# Patient Record
Sex: FEMALE | Race: WHITE | NOT HISPANIC OR LATINO | Employment: OTHER | URBAN - METROPOLITAN AREA
[De-identification: names, ages, dates, MRNs, and addresses within clinical notes are randomized per-mention and may not be internally consistent; named-entity substitution may affect disease eponyms.]

---

## 2023-08-07 ENCOUNTER — HOSPITAL ENCOUNTER (OUTPATIENT)
Dept: DATA CONVERSION | Facility: HOSPITAL | Age: 63
End: 2023-08-07

## 2023-08-07 DIAGNOSIS — M16.11 UNILATERAL PRIMARY OSTEOARTHRITIS, RIGHT HIP: ICD-10-CM

## 2023-09-12 ENCOUNTER — HOSPITAL ENCOUNTER (OUTPATIENT)
Dept: DATA CONVERSION | Facility: HOSPITAL | Age: 63
Discharge: HOME | End: 2023-09-12
Payer: COMMERCIAL

## 2023-09-12 DIAGNOSIS — R30.9 PAINFUL MICTURITION, UNSPECIFIED: ICD-10-CM

## 2023-09-12 DIAGNOSIS — M16.11 UNILATERAL PRIMARY OSTEOARTHRITIS, RIGHT HIP: ICD-10-CM

## 2023-09-12 LAB
BACTERIA SPEC CULT: NORMAL
BACTERIA UR QL AUTO: NEGATIVE
BILIRUB UR QL STRIP.AUTO: NEGATIVE
CC # UR: NORMAL /UL
CLARITY UR: CLEAR
COLOR UR: YELLOW
GLUCOSE UR STRIP.AUTO-MCNC: NEGATIVE MG/DL
HGB UR QL STRIP.AUTO: ABNORMAL /HPF (ref 0–3)
HGB UR QL: NEGATIVE
HYALINE CASTS UR QL AUTO: ABNORMAL /LPF
KETONES UR QL STRIP.AUTO: NEGATIVE
LEUKOCYTE ESTERASE UR QL STRIP.AUTO: ABNORMAL
MICROSCOPIC (UA): ABNORMAL
MRSA DNA SPEC QL NAA+PROBE: NEGATIVE
NITRITE UR QL STRIP.AUTO: NEGATIVE
PH UR STRIP.AUTO: 6 [PH] (ref 4.6–8)
PROT UR STRIP.AUTO-MCNC: NEGATIVE MG/DL
REPORT STATUS -LH SQ DATA CONVERSION: NORMAL
SERVICE CMNT-IMP: NORMAL
SP GR UR STRIP.AUTO: 1.01 (ref 1–1.03)
SPECIMEN SOURCE: NORMAL
SPECIMEN SOURCE: NORMAL
SQUAMOUS UR QL AUTO: ABNORMAL /HPF
URINE CULTURE: ABNORMAL
UROBILINOGEN UR QL STRIP.AUTO: NORMAL MG/DL (ref 0–1)
WBC #/AREA URNS AUTO: 3 /HPF (ref 0–3)

## 2023-09-25 ENCOUNTER — HOSPITAL ENCOUNTER (OUTPATIENT)
Dept: DATA CONVERSION | Facility: HOSPITAL | Age: 63
Discharge: HOME HEALTH | End: 2023-09-26
Payer: COMMERCIAL

## 2023-09-25 DIAGNOSIS — D62 ACUTE POSTHEMORRHAGIC ANEMIA: ICD-10-CM

## 2023-09-25 DIAGNOSIS — Z85.42 PERSONAL HISTORY OF MALIGNANT NEOPLASM OF OTHER PARTS OF UTERUS: ICD-10-CM

## 2023-09-25 DIAGNOSIS — G47.33 OBSTRUCTIVE SLEEP APNEA (ADULT) (PEDIATRIC): ICD-10-CM

## 2023-09-25 DIAGNOSIS — M16.11 UNILATERAL PRIMARY OSTEOARTHRITIS, RIGHT HIP: ICD-10-CM

## 2023-09-25 DIAGNOSIS — E66.9 OBESITY, UNSPECIFIED: ICD-10-CM

## 2023-09-25 LAB
ABO + RH BLD: NORMAL
BLD GP AB SCN SERPL QL: NEGATIVE
BLD PROD TYP BPU: NORMAL
BPU ID: NORMAL
BPU ID: NORMAL
HX OF BLOOD TRANSFUSION: NORMAL
MAJ XM SERPL-IMP: NORMAL
MAJ XM SERPL-IMP: NORMAL
NUM BPU REQUESTED: 2
SPECIMEN EXP DATE BLD: NORMAL
STATUS OF UNIT: NORMAL
STATUS OF UNIT: NORMAL
SURGERY DATE: NORMAL
TEST ORDERED: NORMAL
TRANSF BAND NUM PATIENT: NORMAL
TRANSFUSION STATUS: NORMAL
TRANSFUSION STATUS: NORMAL
UNIT DIVISION: 0
UNIT DIVISION: 0

## 2023-09-26 LAB
ANION GAP SERPL CALCULATED.3IONS-SCNC: 9 MMOL/L (ref 0–19)
BASOPHILS # BLD AUTO: 0.01 K/UL (ref 0–0.22)
BASOPHILS NFR BLD AUTO: 0.1 % (ref 0–1)
BUN SERPL-MCNC: 18 MG/DL (ref 8–25)
BUN/CREAT SERPL: 22.5 RATIO (ref 8–21)
CALCIUM SERPL-MCNC: 8.7 MG/DL (ref 8.5–10.4)
CHLORIDE SERPL-SCNC: 107 MMOL/L (ref 97–107)
CO2 SERPL-SCNC: 21 MMOL/L (ref 24–31)
CREAT SERPL-MCNC: 0.8 MG/DL (ref 0.4–1.6)
DEPRECATED RDW RBC AUTO: 44.2 FL (ref 37–54)
DIFFERENTIAL METHOD BLD: ABNORMAL
EOSINOPHIL # BLD AUTO: 0.02 K/UL (ref 0–0.45)
EOSINOPHIL NFR BLD: 0.2 % (ref 0–3)
ERYTHROCYTE [DISTWIDTH] IN BLOOD BY AUTOMATED COUNT: 13 % (ref 11.7–15)
GFR SERPL CREATININE-BSD FRML MDRD: 82 ML/MIN/1.73 M2
GLUCOSE SERPL-MCNC: 143 MG/DL (ref 65–99)
HCT VFR BLD AUTO: 34.5 % (ref 36–44)
HGB BLD-MCNC: 11.5 GM/DL (ref 12–15)
IMM GRANULOCYTES # BLD AUTO: 0.1 K/UL (ref 0–0.1)
IRON SATN MFR SERPL: 19.6 % (ref 12–50)
IRON SERPL-MCNC: 47 UG/DL (ref 30–160)
LYMPHOCYTES # BLD AUTO: 1.56 K/UL (ref 1.2–3.2)
LYMPHOCYTES NFR BLD MANUAL: 11.8 % (ref 20–40)
MCH RBC QN AUTO: 30.8 PG (ref 26–34)
MCHC RBC AUTO-ENTMCNC: 33.3 % (ref 31–37)
MCV RBC AUTO: 92.5 FL (ref 80–100)
MONOCYTES # BLD AUTO: 1.59 K/UL (ref 0–0.8)
MONOCYTES NFR BLD MANUAL: 12 % (ref 0–8)
NEUTROPHILS # BLD AUTO: 9.95 K/UL
NEUTROPHILS # BLD AUTO: 9.95 K/UL (ref 1.8–7.7)
NEUTROPHILS.IMMATURE NFR BLD: 0.8 % (ref 0–1)
NEUTS SEG NFR BLD: 75.1 % (ref 50–70)
NRBC BLD-RTO: 0 /100 WBC
PLATELET # BLD AUTO: 243 K/UL (ref 150–450)
PMV BLD AUTO: 10.8 CU (ref 7–12.6)
POTASSIUM SERPL-SCNC: 4.2 MMOL/L (ref 3.4–5.1)
RBC # BLD AUTO: 3.73 M/UL (ref 4–4.9)
SODIUM SERPL-SCNC: 137 MMOL/L (ref 133–145)
TIBC SERPL-MCNC: 240 UG/DL (ref 228–428)
WBC # BLD AUTO: 13.2 K/UL (ref 4.5–11)

## 2023-10-10 ENCOUNTER — OFFICE VISIT (OUTPATIENT)
Dept: ORTHOPEDIC SURGERY | Facility: CLINIC | Age: 63
End: 2023-10-10
Payer: COMMERCIAL

## 2023-10-10 VITALS — BODY MASS INDEX: 38.25 KG/M2 | WEIGHT: 238 LBS | HEIGHT: 66 IN

## 2023-10-10 DIAGNOSIS — Z96.641 STATUS POST TOTAL HIP REPLACEMENT, RIGHT: Primary | ICD-10-CM

## 2023-10-10 PROCEDURE — 99024 POSTOP FOLLOW-UP VISIT: CPT | Performed by: ORTHOPAEDIC SURGERY

## 2023-10-10 NOTE — PROGRESS NOTES
Subjective    Patient ID: Avis Brito is a 63 y.o. female.    Chief Complaint: 2 weeks status post right total hip Post-op of the Right Hip (HANSEL)     Last Surgery: No surgery found  Last Surgery Date: No surgery found    HPI doing very well    Objective   Ortho Exam excellent range of motion no distal edema mild swelling wound is intact walking on the walker    Image Results:  XR hip right 1 view  PROCEDURE:         HIP RT 1 VIEW W OR WO AP PELVIS - IXR  0124  REASON FOR EXAM: postop    RESULT: MRN: [id number]  Patient Name: AVIS BRITO    STUDY:  HIP RT 1 VIEW W OR WO AP PELVIS    INDICATION:  postop    COMPARISON:  April 18, 2023    ACCESSION NUMBER(S):  [id number]    ORDERING CLINICIAN:  IRMA CHRISTIAN    TECHNIQUE:  Views: AP    FINDINGS:  RESULT: Postsurgical changes from right total hip arthroplasty are noted.  Soft tissue gas is noted adjacent to the proximal right femur. There is  joint space narrowing and osteophyte formation of the left hip.    IMPRESSION:  Postsurgical changes from right total hip arthroplasty.    Dictation workstation:   [id number]    Original Interpreting Physician:   ALF ROMEO MD  Original Transcribed by/Date: MMODAL Sep 25 2023  7:23A  Original Electronically Signed by/Date: ALF ROMEO MD Sep 25 2023  10:20A    Addendum Interpreting Physician:  Addendum Transcribed by/Date: NO ADDENDUM  Addendum Electronically Signed by/Date:      Assessment/Plan doing very well we will continue her in outpatient physical therapy she will continue to ice elevate we will see her in 4 weeks  Encounter Diagnoses:  Status post total hip replacement, right    No orders of the defined types were placed in this encounter.    No follow-ups on file.

## 2023-10-17 ENCOUNTER — APPOINTMENT (OUTPATIENT)
Dept: PHYSICAL THERAPY | Facility: HOSPITAL | Age: 63
End: 2023-10-17

## 2023-10-17 ENCOUNTER — EVALUATION (OUTPATIENT)
Dept: PHYSICAL THERAPY | Facility: HOSPITAL | Age: 63
End: 2023-10-17
Payer: COMMERCIAL

## 2023-10-17 DIAGNOSIS — M25.551 PAIN OF RIGHT HIP: Primary | ICD-10-CM

## 2023-10-17 DIAGNOSIS — Z96.641 STATUS POST TOTAL HIP REPLACEMENT, RIGHT: ICD-10-CM

## 2023-10-17 PROCEDURE — 97161 PT EVAL LOW COMPLEX 20 MIN: CPT | Mod: GP | Performed by: PHYSICAL THERAPIST

## 2023-10-17 PROCEDURE — 97110 THERAPEUTIC EXERCISES: CPT | Mod: GP | Performed by: PHYSICAL THERAPIST

## 2023-10-17 ASSESSMENT — ENCOUNTER SYMPTOMS
LOSS OF SENSATION IN FEET: 0
DEPRESSION: 0
OCCASIONAL FEELINGS OF UNSTEADINESS: 0

## 2023-10-17 ASSESSMENT — PAIN DESCRIPTION - DESCRIPTORS: DESCRIPTORS: THROBBING

## 2023-10-17 ASSESSMENT — PAIN - FUNCTIONAL ASSESSMENT: PAIN_FUNCTIONAL_ASSESSMENT: 0-10

## 2023-10-17 ASSESSMENT — PAIN SCALES - GENERAL: PAINLEVEL_OUTOF10: 2

## 2023-10-17 NOTE — Clinical Note
October 17, 2023     Patient: Kelli Brito   YOB: 1960   Date of Visit: 10/17/2023       To Whom It May Concern:    It is my medical opinion that Kelli Brito {Work release (duty restriction):30225}.    If you have any questions or concerns, please don't hesitate to call.         Sincerely,        Brandy Mcqueen, PT    CC: No Recipients

## 2023-10-17 NOTE — Clinical Note
October 17, 2023     Patient: Kelli Brito   YOB: 1960   Date of Visit: 10/17/2023       To Whom it May Concern:    Kelli Brito was seen in my clinic on 10/17/2023. She {Return to school/sport:51865}.    If you have any questions or concerns, please don't hesitate to call.         Sincerely,          Brandy Mcqueen, PT        CC: No Recipients Wheelchair/Stroller

## 2023-10-17 NOTE — PROGRESS NOTES
Physical Therapy    Physical Therapy Evaluation and Treatment      Patient Name: Cecelia Brito  MRN: [id number]  Today's Date: 10/17/2023  Time Calculation  Start Time: 1015  Stop Time: 1055  Time Calculation (min): 40 min      Assessment:  The patient is s/p R HANSEL on 09/25/2023 due to OA. She is not allowed to do R hip abduction until 6 weeks post-op. The patient demonstrates decreased R LE strength, ROM, flexibility, balance, and gait. Skilled PT warranted to address the above stated impairments, so the patient can perform FA's and ambulate without assistive device in the community.   PT Assessment Results: Decreased strength, Decreased range of motion, Decreased endurance, Impaired balance, Decreased mobility  Rehab Prognosis: Good    Plan:  Treatment/Interventions: Cryotherapy, Education/ Instruction, Gait training, Hot pack, Manual therapy, Neuromuscular re-education, Therapeutic exercises  PT Plan: Skilled PT  PT Frequency: 2 times per week  Duration: 6 weeks  Onset Date: 09/25/23  Rehab Potential: Good  Plan of Care Agreement: Patient    Current Problem:   1. Status post total hip replacement, right  Referral to Physical Therapy    Follow Up In Physical Therapy          Subjective    The patient states that she finished home therapy and is doing her HEP regularly. No major issues since her surgery.  General:  General  Reason for Referral: R HANSEL  Referred By: Dr. Mohamud  Precautions:  Precautions  STEADI Fall Risk Score (The score of 4 or more indicates an increased risk of falling): 7  Precautions Comment: No R hip abduction until 6 weeks post op     Pain:  Pain Assessment  Pain Assessment: 0-10  Pain Score: 2  Pain Type: Surgical pain  Pain Location: Hip  Pain Orientation: Right, Posterior, Outer  Pain Descriptors: Throbbing  Pain Frequency: Constant/continuous2/10 at lowest  5/10 at highest  Home Living:   Lives in multi-level home with bed and bath on first floor. 3 steps into home with rail.  Prior Level  of Function:  Prior Function Per Pt/Caregiver Report  Level of [place]: Independent with ADLs and functional transfers  Vocational: Retired  Leisure: Spending time with familyThe patient enjoys working on her home- including remodeling her home. Has 2 dogs and a cat. Has a spouse. He assists as needed. The patient is having more difficulty sleeping due to having to lay on her back.    Objective    R hip not measured for MMT due to recent surgery and post-op restrictions     10/17/23 1300   Hip Functional Rating Scale   LEFS   /80 26   Hip AROM   R hip flexion: (125°) 60   L hip flexion: (125°) WNL   L hip IR: (45°) Mod. Restrict.   Specific Lower Extremity MMT   L Iliopsoas: (5/5) 4+/5             Treatments:  Reviewed her HEP including quad set, standing hip extension, standing HS curls, mini squats, seated LAQ, heel slides.      OP EDUCATION:  Education  Individual(s) Educated: Patient  Education Provided: Anatomy, Fall Risk, Home Exercise Program, POC, Post-Op Precautions  Patient/Caregiver Demonstrated Understanding: yes  Plan of Care Discussed and Agreed Upon: yes  Patient Response to Education: Patient/Caregiver Verbalized Understanding of Information, Patient/Caregiver Performed Return Demonstration of Exercises/Activities    Goals:  Active       PT Problem       Patient will be I with HEP       Start:  10/17/23    Expected End:  10/31/23            Patient will ambulate community distances with std. cane       Start:  10/17/23    Expected End:  11/07/23            Patient will report no greater than 2/10 R hip pain with ambulating >/= 20 minutes in the community independently       Start:  10/17/23    Expected End:  11/28/23            Patient will demonstrate >/= 90 degrees of R hip flexion AROM to improve her ability to perform FA's, including transfers       Start:  10/17/23    Expected End:  11/28/23            Patient will demonstrate >/= 4+/5 R LE MMT to improve her ability to negotiate steps and  ambulate community distances independently.       Start:  10/17/23    Expected End:  11/28/23            Patient will score >/= 40/80 on LEFS to improve her ability to perform FA's       Start:  10/17/23    Expected End:  11/28/23

## 2023-10-19 PROBLEM — F41.9 ANXIETY: Status: ACTIVE | Noted: 2023-10-19

## 2023-10-19 PROBLEM — J32.9 SINUSITIS: Status: ACTIVE | Noted: 2023-10-19

## 2023-10-19 PROBLEM — G47.30 SLEEP APNEA: Status: ACTIVE | Noted: 2023-10-19

## 2023-10-19 PROBLEM — J30.9 ALLERGIC RHINITIS: Status: ACTIVE | Noted: 2023-10-19

## 2023-10-19 PROBLEM — R91.1 PULMONARY NODULE: Status: ACTIVE | Noted: 2023-10-19

## 2023-10-19 PROBLEM — E55.9 VITAMIN D DEFICIENCY: Status: ACTIVE | Noted: 2023-10-19

## 2023-10-19 PROBLEM — N95.1 MENOPAUSAL SYMPTOM: Status: ACTIVE | Noted: 2023-10-19

## 2023-10-19 PROBLEM — M51.37 DEGENERATION OF LUMBOSACRAL INTERVERTEBRAL DISC: Status: ACTIVE | Noted: 2023-10-19

## 2023-10-19 PROBLEM — H04.123 DRY EYES, BILATERAL: Status: ACTIVE | Noted: 2023-10-19

## 2023-10-19 PROBLEM — I10 HYPERTENSION: Status: ACTIVE | Noted: 2023-10-19

## 2023-10-19 PROBLEM — M99.04 SOMATIC DYSFUNCTION OF SACRAL REGION: Status: ACTIVE | Noted: 2023-10-19

## 2023-10-19 PROBLEM — K80.20 CHOLELITHIASES: Status: ACTIVE | Noted: 2023-10-19

## 2023-10-19 PROBLEM — M25.529 ELBOW PAIN: Status: ACTIVE | Noted: 2023-10-19

## 2023-10-19 PROBLEM — R92.8 ABNORMAL MAMMOGRAM: Status: ACTIVE | Noted: 2023-10-19

## 2023-10-19 PROBLEM — C55 UTERINE CANCER (MULTI): Status: ACTIVE | Noted: 2023-10-19

## 2023-10-19 PROBLEM — E78.5 HYPERLIPIDEMIA: Status: ACTIVE | Noted: 2023-10-19

## 2023-10-19 PROBLEM — I47.20 VENTRICULAR TACHYCARDIA (MULTI): Status: ACTIVE | Noted: 2023-10-19

## 2023-10-19 PROBLEM — M77.8 TENDONITIS OF SHOULDER, RIGHT: Status: ACTIVE | Noted: 2023-10-19

## 2023-10-19 PROBLEM — G47.10 HYPERSOMNIA: Status: ACTIVE | Noted: 2023-10-19

## 2023-10-19 PROBLEM — S53.106A ELBOW DISLOCATION: Status: ACTIVE | Noted: 2023-10-19

## 2023-10-19 PROBLEM — E04.1 THYROID NODULE: Status: ACTIVE | Noted: 2023-10-19

## 2023-10-19 PROBLEM — N94.89 ENDOMETRIAL MASS: Status: ACTIVE | Noted: 2023-10-19

## 2023-10-19 PROBLEM — C54.1 ENDOMETRIAL ADENOCARCINOMA (MULTI): Status: ACTIVE | Noted: 2023-10-19

## 2023-10-19 PROBLEM — K21.9 GERD (GASTROESOPHAGEAL REFLUX DISEASE): Status: ACTIVE | Noted: 2023-10-19

## 2023-10-19 PROBLEM — R76.8 ELEVATED ANTINUCLEAR ANTIBODY (ANA) LEVEL: Status: ACTIVE | Noted: 2023-10-19

## 2023-10-19 PROBLEM — R52: Status: ACTIVE | Noted: 2023-10-19

## 2023-10-19 PROBLEM — M85.80 OSTEOPENIA: Status: ACTIVE | Noted: 2023-10-19

## 2023-10-19 PROBLEM — M54.9 BACK PAIN: Status: ACTIVE | Noted: 2023-10-19

## 2023-10-19 PROBLEM — M35.3 POLYMYALGIA RHEUMATICA (MULTI): Status: ACTIVE | Noted: 2023-10-19

## 2023-10-19 PROBLEM — G47.00 INSOMNIA: Status: ACTIVE | Noted: 2023-10-19

## 2023-10-19 PROBLEM — M79.10 MUSCLE PAIN: Status: ACTIVE | Noted: 2023-10-19

## 2023-10-19 PROBLEM — M19.90 ARTHRITIS: Status: ACTIVE | Noted: 2023-10-19

## 2023-10-19 PROBLEM — M51.379 DEGENERATION OF LUMBOSACRAL INTERVERTEBRAL DISC: Status: ACTIVE | Noted: 2023-10-19

## 2023-10-19 PROBLEM — E66.9 OBESE: Status: ACTIVE | Noted: 2023-10-19

## 2023-10-19 PROBLEM — J30.2 SEASONAL ALLERGIES: Status: ACTIVE | Noted: 2023-10-19

## 2023-10-19 PROBLEM — S83.101A: Status: ACTIVE | Noted: 2023-10-19

## 2023-10-19 PROBLEM — M51.26 DISPLACEMENT OF LUMBAR INTERVERTEBRAL DISC WITHOUT MYELOPATHY: Status: ACTIVE | Noted: 2023-10-19

## 2023-10-19 PROBLEM — N95.0 POSTMENOPAUSAL BLEEDING: Status: ACTIVE | Noted: 2023-10-19

## 2023-10-19 PROBLEM — C54.1 ENDOMETRIAL CANCER DETERMINED BY UTERINE BIOPSY (MULTI): Status: ACTIVE | Noted: 2023-10-19

## 2023-10-19 PROBLEM — R68.2 DRY MOUTH: Status: ACTIVE | Noted: 2023-10-19

## 2023-10-19 PROBLEM — R20.9 SKIN SENSATION DISTURBANCE: Status: ACTIVE | Noted: 2023-10-19

## 2023-10-19 RX ORDER — ALPRAZOLAM 0.5 MG/1
TABLET ORAL
COMMUNITY
Start: 2015-07-16

## 2023-10-19 RX ORDER — SERTRALINE HYDROCHLORIDE 100 MG/1
2 TABLET, FILM COATED ORAL DAILY
COMMUNITY
Start: 2015-10-27

## 2023-10-19 RX ORDER — ATORVASTATIN CALCIUM 20 MG/1
TABLET, FILM COATED ORAL
COMMUNITY
End: 2023-10-31 | Stop reason: SDUPTHER

## 2023-10-19 RX ORDER — HYDROCODONE BITARTRATE AND ACETAMINOPHEN 5; 325 MG/1; MG/1
1 TABLET ORAL EVERY 4 HOURS PRN
COMMUNITY
Start: 2023-07-25 | End: 2024-01-02 | Stop reason: WASHOUT

## 2023-10-19 RX ORDER — CHLORHEXIDINE GLUCONATE ORAL RINSE 1.2 MG/ML
SOLUTION DENTAL
COMMUNITY
Start: 2023-07-25 | End: 2024-01-02 | Stop reason: WASHOUT

## 2023-10-19 RX ORDER — CEPHALEXIN 500 MG/1
500 CAPSULE ORAL 3 TIMES DAILY
COMMUNITY
Start: 2023-09-25 | End: 2024-01-02 | Stop reason: WASHOUT

## 2023-10-19 RX ORDER — ESOMEPRAZOLE MAGNESIUM 40 MG/1
1 CAPSULE, DELAYED RELEASE ORAL DAILY
COMMUNITY
Start: 2015-07-16 | End: 2024-01-02 | Stop reason: WASHOUT

## 2023-10-20 ENCOUNTER — TREATMENT (OUTPATIENT)
Dept: PHYSICAL THERAPY | Facility: HOSPITAL | Age: 63
End: 2023-10-20
Payer: COMMERCIAL

## 2023-10-20 DIAGNOSIS — M25.551 RIGHT HIP PAIN: Primary | ICD-10-CM

## 2023-10-20 DIAGNOSIS — Z96.641 STATUS POST TOTAL HIP REPLACEMENT, RIGHT: ICD-10-CM

## 2023-10-20 PROCEDURE — 97110 THERAPEUTIC EXERCISES: CPT | Mod: GP | Performed by: PHYSICAL THERAPIST

## 2023-10-20 ASSESSMENT — PAIN SCALES - GENERAL: PAINLEVEL_OUTOF10: 2

## 2023-10-20 ASSESSMENT — PAIN - FUNCTIONAL ASSESSMENT: PAIN_FUNCTIONAL_ASSESSMENT: 0-10

## 2023-10-20 ASSESSMENT — PAIN DESCRIPTION - DESCRIPTORS: DESCRIPTORS: ACHING

## 2023-10-20 NOTE — PROGRESS NOTES
"Physical Therapy    Physical Therapy Treatment    Patient Name: Kelli Brito  MRN: [id number]  Today's Date: 10/20/2023  Time Calculation  Start Time: 1317  Stop Time: 1400  Time Calculation (min): 43 min      Assessment:  The patient demonstrates good carryover from initial evaluation. She is performing her HEP regularly. The patient's gait is improving, and she is presents with more upright posture. PT and patient following no R hip abd. Precautions.  PT Assessment  PT Assessment Results: Decreased strength, Decreased range of motion, Decreased endurance, Impaired balance, Decreased mobility  Rehab Prognosis: Good    Plan:  OP PT Plan  PT Plan: Skilled PT  PT Frequency: 2 times per week (2 of 12)    Current Problem  1. Right hip pain        2. Status post total hip replacement, right  Follow Up In Physical Therapy          Subjective   The patient states that she continues to do her HEP. She is worried about being behind due to not being able to do R hip abd. Until 6 weeks post-op.  General  Reason for Referral: R HANSEL  Precautions  Precautions  Post-Surgical Precautions: Right hip precautions (No R hip abduction until 6 weeks post op)       Pain  Pain Assessment: 0-10  Pain Score: 2  Pain Type: Surgical pain  Pain Location: Hip  Pain Orientation: Right  Pain Descriptors: Aching  Pain Frequency: Intermittent    Objective     Treatments:  Therapeutic Exercise  Therapeutic Exercise Performed: Yes  Therapeutic Exercise Activity 1: Nustep L3 x 6'  Therapeutic Exercise Activity 2: Incline calf stretch x 60\"  Therapeutic Exercise Activity 3: AI[employer] F/B stepping 2 x 10 ea. R/L  Therapeutic Exercise Activity 4: AIRE[employer] EC standing Normal JULIANE 2 x 30\"  Therapeutic Exercise Activity 5: 4 in. step up x 10 ea. R/L  Therapeutic Exercise Activity 6: R TKE blue band x 20  Therapeutic Exercise Activity 7: Mini squats 2 x 10  Therapeutic Exercise Activity 8: Pallof Press yellow 2 x 10 ea. R/L  Therapeutic Exercise Activity 9: H/L " "TA hip add. rolled towel b/w knees 2 x 10 5\" hold           OP EDUCATION:  Education  Individual(s) Educated: Patient  Education Provided: Home Exercise Program, Fall Risk  Patient/Caregiver Demonstrated Understanding: yes    Goals:  Active       PT Problem       Patient will be I with HEP       Start:  10/17/23    Expected End:  10/31/23            Patient will ambulate community distances with std. cane       Start:  10/17/23    Expected End:  11/07/23            Patient will report no greater than 2/10 R hip pain with ambulating >/= 20 minutes in the community independently       Start:  10/17/23    Expected End:  11/28/23            Patient will demonstrate >/= 90 degrees of R hip flexion AROM to improve her ability to perform FA's, including transfers       Start:  10/17/23    Expected End:  11/28/23            Patient will demonstrate >/= 4+/5 R LE MMT to improve her ability to negotiate steps and ambulate community distances independently.       Start:  10/17/23    Expected End:  11/28/23            Patient will score >/= 40/80 on LEFS to improve her ability to perform FA's       Start:  10/17/23    Expected End:  11/28/23                   "

## 2023-10-24 ENCOUNTER — APPOINTMENT (OUTPATIENT)
Dept: PHYSICAL THERAPY | Facility: HOSPITAL | Age: 63
End: 2023-10-24
Payer: COMMERCIAL

## 2023-10-25 ENCOUNTER — PATIENT MESSAGE (OUTPATIENT)
Dept: ORTHOPEDIC SURGERY | Facility: CLINIC | Age: 63
End: 2023-10-25
Payer: COMMERCIAL

## 2023-10-27 ENCOUNTER — TREATMENT (OUTPATIENT)
Dept: PHYSICAL THERAPY | Facility: HOSPITAL | Age: 63
End: 2023-10-27
Payer: COMMERCIAL

## 2023-10-27 DIAGNOSIS — Z96.642 HISTORY OF LEFT HIP REPLACEMENT: Primary | ICD-10-CM

## 2023-10-27 DIAGNOSIS — Z96.641 STATUS POST TOTAL HIP REPLACEMENT, RIGHT: ICD-10-CM

## 2023-10-27 PROCEDURE — 97110 THERAPEUTIC EXERCISES: CPT | Mod: GP,CQ

## 2023-10-27 RX ORDER — HYDROCODONE BITARTRATE AND ACETAMINOPHEN 5; 325 MG/1; MG/1
1 TABLET ORAL EVERY 6 HOURS PRN
Qty: 20 TABLET | Refills: 0 | Status: SHIPPED | OUTPATIENT
Start: 2023-10-27 | End: 2023-11-03

## 2023-10-27 ASSESSMENT — PAIN SCALES - GENERAL: PAINLEVEL_OUTOF10: 2

## 2023-10-27 ASSESSMENT — PAIN - FUNCTIONAL ASSESSMENT: PAIN_FUNCTIONAL_ASSESSMENT: 0-10

## 2023-10-27 NOTE — PROGRESS NOTES
"Physical Therapy    Physical Therapy Treatment    Patient Name: Kelli Brito  MRN: [id number]  Today's Date: 10/27/2023  Time Calculation  Start Time: 1330  Stop Time: 1409  Time Calculation (min): 39 min      Assessment: Pt required cuing to correct step up technique exercise as she was not performing with the correct sequence. Pt was able to complete session without c/o increased symptoms in the R hip.    PT Assessment  PT Assessment Results: Decreased strength, Decreased range of motion, Decreased endurance, Impaired balance, Decreased mobility  Rehab Prognosis: Good    Plan:Continue to progress current POC as tolerated to facilitate ability to perform functional activities.   OP PT Plan  PT Plan: Skilled PT  PT Frequency: 2 times per week (3 of 12)    Current Problem  1. Status post total hip replacement, right  Follow Up In Physical Therapy          Subjective Pt states it feels like the muscles she is allowed to work on are getting tight from working them so much. Pt states she is able to negotiate reciprocally with descending and ascending.   General  Reason for Referral: R HANSEL  Referred By: Dr. Mohamud  Precautions  Precautions  Post-Surgical Precautions: Right hip precautions (No R Hip Abduction until 6 weeks post op)       Pain  Pain Assessment: 0-10  Pain Score: 2  Pain Location: Hip  Pain Orientation: Right    Objective        Posture     Extremity/Trunk Assessment           Outcome Measures:      Treatments:  Therapeutic Exercise  Therapeutic Exercise Performed: Yes  Therapeutic Exercise Activity 1: Nustep L3 x 6'  Therapeutic Exercise Activity 2: Incline calf stretch x 60\"  Therapeutic Exercise Activity 3: [employer] F/B stepping 2 x 10 ea. R/L  Therapeutic Exercise Activity 4: AIRE[employer] EC standing Normal JULIANE 2 x 30\"  Therapeutic Exercise Activity 5: 4 in. step up x 10 ea. R/L  Therapeutic Exercise Activity 6: R TKE blue band x 20  Therapeutic Exercise Activity 7: Mini squats 2 x 10  Therapeutic Exercise " "Activity 8: Pallof Press green 2 x 10 ea. R/L  Therapeutic Exercise Activity 9: H/L TA hip add. rolled towel b/w knees 2 x 10 5\" hold  Therapeutic Exercise Activity 10: LAQ 4# x 20 `  Therapeutic Exercise Activity 11: Hamstring curls green x 20    Manual Therapy  Manual Therapy Performed: Yes  Manual Therapy Activity 1: PROM R Hip Flexion  Activity:      OP EDUCATION:  Education  Individual(s) Educated: Patient  Education Provided: Home Exercise Program  Patient Response to Education: Patient/Caregiver Verbalized Understanding of Information    Goals:  Active       PT Problem       Patient will be I with HEP       Start:  10/17/23    Expected End:  10/31/23            Patient will ambulate community distances with std. cane       Start:  10/17/23    Expected End:  11/07/23            Patient will report no greater than 2/10 R hip pain with ambulating >/= 20 minutes in the community independently       Start:  10/17/23    Expected End:  11/28/23            Patient will demonstrate >/= 90 degrees of R hip flexion AROM to improve her ability to perform FA's, including transfers       Start:  10/17/23    Expected End:  11/28/23            Patient will demonstrate >/= 4+/5 R LE MMT to improve her ability to negotiate steps and ambulate community distances independently.       Start:  10/17/23    Expected End:  11/28/23            Patient will score >/= 40/80 on LEFS to improve her ability to perform FA's       Start:  10/17/23    Expected End:  11/28/23                   "

## 2023-10-30 ENCOUNTER — E-VISIT (OUTPATIENT)
Dept: PRIMARY CARE | Facility: CLINIC | Age: 63
End: 2023-10-30
Payer: COMMERCIAL

## 2023-10-30 DIAGNOSIS — E78.2 MIXED HYPERLIPIDEMIA: Primary | ICD-10-CM

## 2023-10-30 RX ORDER — HYDROCODONE BITARTRATE AND ACETAMINOPHEN 5; 325 MG/1; MG/1
1 TABLET ORAL EVERY 6 HOURS PRN
Qty: 20 TABLET | Refills: 0 | OUTPATIENT
Start: 2023-10-30 | End: 2023-11-06

## 2023-10-31 ENCOUNTER — TREATMENT (OUTPATIENT)
Dept: PHYSICAL THERAPY | Facility: HOSPITAL | Age: 63
End: 2023-10-31
Payer: COMMERCIAL

## 2023-10-31 DIAGNOSIS — M25.551 RIGHT HIP PAIN: ICD-10-CM

## 2023-10-31 DIAGNOSIS — M25.551 PAIN OF RIGHT HIP: Primary | ICD-10-CM

## 2023-10-31 DIAGNOSIS — Z96.641 STATUS POST TOTAL HIP REPLACEMENT, RIGHT: ICD-10-CM

## 2023-10-31 PROCEDURE — 97110 THERAPEUTIC EXERCISES: CPT | Mod: GP,CQ

## 2023-10-31 RX ORDER — ATORVASTATIN CALCIUM 20 MG/1
TABLET, FILM COATED ORAL
Qty: 90 TABLET | Refills: 0 | Status: SHIPPED | OUTPATIENT
Start: 2023-10-31 | End: 2024-01-29

## 2023-10-31 ASSESSMENT — PAIN SCALES - GENERAL: PAINLEVEL_OUTOF10: 3

## 2023-10-31 ASSESSMENT — PAIN - FUNCTIONAL ASSESSMENT: PAIN_FUNCTIONAL_ASSESSMENT: 0-10

## 2023-10-31 NOTE — PROGRESS NOTES
"Physical Therapy    Physical Therapy Treatment    Patient Name: Kelli Brito  MRN: [id number]  Today's Date: 10/31/2023         Assessment: Pt required cuing to control eccentric phase with step ups performed today with fair follow through demonstrated.    PT Assessment  PT Assessment Results: Decreased strength, Decreased range of motion, Decreased endurance, Impaired balance, Decreased mobility  Rehab Prognosis: Good    Plan:Continue to progress current POC as tolerated to facilitate ability to perform functional activities.   OP PT Plan  PT Plan: Skilled PT  PT Frequency: 2 times per week (4 of 12)    Current Problem  1. Status post total hip replacement, right  Follow Up In Physical Therapy          Subjective Pt states she has been using a cane more often at home, however is noticing increased soreness in the R quadriceps area.    General  Reason for Referral: R HANSEL  Referred By: Dr. Mohamud  Precautions  Precautions  Post-Surgical Precautions: Right hip precautions (No Hip Abduction until 6 weeks post op)       Pain  Pain Assessment: 0-10  Pain Score: 3  Pain Location: Other (Comment) (Quadriceps)  Pain Orientation: Right    Objective      Posture     Extremity/Trunk Assessment          Outcome Measures:      Treatments:  Therapeutic Exercise  Therapeutic Exercise Performed: Yes  Therapeutic Exercise Activity 1: Nustep L3 x 6'  Therapeutic Exercise Activity 2: Incline calf stretch x 60\"  Therapeutic Exercise Activity 3: [employer] F/B stepping 2 x 10 ea. R/L  Therapeutic Exercise Activity 4: [employer] EC standing Normal JULIANE 2 x 30\"  Therapeutic Exercise Activity 5: 4 in. step up x 10 ea. R/L       Activity:      OP EDUCATION:       Goals:  Active       PT Problem       Patient will be I with HEP       Start:  10/17/23    Expected End:  10/31/23            Patient will ambulate community distances with std. cane       Start:  10/17/23    Expected End:  11/07/23            Patient will report no greater than 2/10 R " hip pain with ambulating >/= 20 minutes in the community independently       Start:  10/17/23    Expected End:  11/28/23            Patient will demonstrate >/= 90 degrees of R hip flexion AROM to improve her ability to perform FA's, including transfers       Start:  10/17/23    Expected End:  11/28/23            Patient will demonstrate >/= 4+/5 R LE MMT to improve her ability to negotiate steps and ambulate community distances independently.       Start:  10/17/23    Expected End:  11/28/23            Patient will score >/= 40/80 on LEFS to improve her ability to perform FA's       Start:  10/17/23    Expected End:  11/28/23

## 2023-11-03 ENCOUNTER — TREATMENT (OUTPATIENT)
Dept: PHYSICAL THERAPY | Facility: HOSPITAL | Age: 63
End: 2023-11-03
Payer: COMMERCIAL

## 2023-11-03 DIAGNOSIS — Z96.641 STATUS POST TOTAL HIP REPLACEMENT, RIGHT: ICD-10-CM

## 2023-11-03 PROCEDURE — 97110 THERAPEUTIC EXERCISES: CPT | Mod: GP,CQ

## 2023-11-03 ASSESSMENT — PAIN - FUNCTIONAL ASSESSMENT: PAIN_FUNCTIONAL_ASSESSMENT: 0-10

## 2023-11-03 ASSESSMENT — PAIN SCALES - GENERAL: PAINLEVEL_OUTOF10: 1

## 2023-11-03 NOTE — PROGRESS NOTES
"Physical Therapy    Physical Therapy Treatment    Patient Name: Kelli Brito  MRN: [id number]  Today's Date: 11/3/2023  Time Calculation  Start Time: 1330  Stop Time: 1410  Time Calculation (min): 40 min      Assessment: Pt performed step ups on 4\" step today d/t soreness in the R LE and having to compensate on the 6\" step. Pt states she is having pain with increased standing time during her daily activities. Advised pt to decrease her standing time at home, by taking intermittent seated rest periods, as she reported she is not taking breaks during her standing activities, like preparing a meal.    PT Assessment  PT Assessment Results: Decreased strength, Decreased range of motion, Decreased endurance, Impaired balance, Decreased mobility  Rehab Prognosis: Good    Plan:Continue to progress current POC as tolerated to facilitate ability to perform functional activities.   OP PT Plan  PT Plan: Skilled PT  PT Frequency: 2 times per week (5 of 12)    Current Problem  1. Status post total hip replacement, right  Follow Up In Physical Therapy          Subjective Pt states her leg is sore from going to two stores, then going out to eat.  Pt reports she is not taking rest periods during some of her daily activities that require standing, which causes pain in the proximal anterior thigh.    General  Reason for Referral: R HANSEL  Referred By: Dr. Mohamud  Precautions  Precautions  Post-Surgical Precautions: Right hip precautions (No Hip Abduction until 6 weeks post op)  Vital Signs     Pain  Pain Assessment: 0-10  Pain Score: 1  Pain Location: Other (Comment) (distal quadriceps)  Pain Orientation: Right    Objective R Hip Flexion PROM 90 degrees  Cognition     Posture     Extremity/Trunk Assessment          Outcome Measures:      Treatments:  Therapeutic Exercise  Therapeutic Exercise Performed: Yes  Therapeutic Exercise Activity 1: Nustep L3 x 6'  Therapeutic Exercise Activity 2: Incline calf stretch x 60\"  Therapeutic " "Exercise Activity 3: [employer] F/B stepping 2 x 10 ea. R/L  Therapeutic Exercise Activity 4: AIRE[employer] EC standing Normal JULIANE 45\",  Therapeutic Exercise Activity 5: 4 in. step up x 10 ea. R/L  Therapeutic Exercise Activity 6: R TKE blue band x 20  Therapeutic Exercise Activity 7: sit to stand x 20  Therapeutic Exercise Activity 8: Pallof Press double blue  2 x 10 ea. R/L  Therapeutic Exercise Activity 9: H/L TA hip add. rolled towel b/w knees 2 x 10 5\" hold  Therapeutic Exercise Activity 10: LAQ 4# x 20  Therapeutic Exercise Activity 11: Hamstring curls green x 20  Therapeutic Exercise Activity 12: Rocking 30\"  Therapeutic Exercise Activity 13: AirEx split stance 30\" R/L       Activity:      OP EDUCATION:  Education  Individual(s) Educated: Patient  Education Provided: Home Exercise Program  Patient Response to Education: Patient/Caregiver Verbalized Understanding of Information    Goals:  Active       PT Problem       Patient will be I with HEP (Progressing)       Start:  10/17/23    Expected End:  10/31/23            Patient will ambulate community distances with std. cane (Progressing)       Start:  10/17/23    Expected End:  11/07/23            Patient will report no greater than 2/10 R hip pain with ambulating >/= 20 minutes in the community independently (Progressing)       Start:  10/17/23    Expected End:  11/28/23            Patient will demonstrate >/= 90 degrees of R hip flexion AROM to improve her ability to perform FA's, including transfers (Progressing)       Start:  10/17/23    Expected End:  11/28/23            Patient will demonstrate >/= 4+/5 R LE MMT to improve her ability to negotiate steps and ambulate community distances independently. (Progressing)       Start:  10/17/23    Expected End:  11/28/23            Patient will score >/= 40/80 on LEFS to improve her ability to perform FA's (Progressing)       Start:  10/17/23    Expected End:  11/28/23                   "

## 2023-11-07 ENCOUNTER — APPOINTMENT (OUTPATIENT)
Dept: PHYSICAL THERAPY | Facility: HOSPITAL | Age: 63
End: 2023-11-07
Payer: COMMERCIAL

## 2023-11-07 ENCOUNTER — DOCUMENTATION (OUTPATIENT)
Dept: PHYSICAL THERAPY | Facility: HOSPITAL | Age: 63
End: 2023-11-07

## 2023-11-07 ENCOUNTER — APPOINTMENT (OUTPATIENT)
Dept: ORTHOPEDIC SURGERY | Facility: CLINIC | Age: 63
End: 2023-11-07
Payer: COMMERCIAL

## 2023-11-08 ENCOUNTER — LAB (OUTPATIENT)
Dept: LAB | Facility: LAB | Age: 63
End: 2023-11-08
Payer: COMMERCIAL

## 2023-11-08 DIAGNOSIS — R35.0 FREQUENCY OF MICTURITION: ICD-10-CM

## 2023-11-08 DIAGNOSIS — R53.83 OTHER FATIGUE: ICD-10-CM

## 2023-11-08 DIAGNOSIS — E78.5 HYPERLIPIDEMIA, UNSPECIFIED: ICD-10-CM

## 2023-11-08 DIAGNOSIS — Z00.00 ENCOUNTER FOR GENERAL ADULT MEDICAL EXAMINATION WITHOUT ABNORMAL FINDINGS: Primary | ICD-10-CM

## 2023-11-08 DIAGNOSIS — M35.3 POLYMYALGIA RHEUMATICA (MULTI): ICD-10-CM

## 2023-11-08 DIAGNOSIS — E55.9 VITAMIN D DEFICIENCY, UNSPECIFIED: ICD-10-CM

## 2023-11-08 LAB
25(OH)D3 SERPL-MCNC: 42 NG/ML (ref 30–100)
ALBUMIN SERPL BCP-MCNC: 4.2 G/DL (ref 3.4–5)
ALP SERPL-CCNC: 105 U/L (ref 33–136)
ALT SERPL W P-5'-P-CCNC: 23 U/L (ref 7–45)
ANION GAP SERPL CALC-SCNC: 10 MMOL/L (ref 10–20)
APPEARANCE UR: CLEAR
AST SERPL W P-5'-P-CCNC: 31 U/L (ref 9–39)
BILIRUB SERPL-MCNC: 0.7 MG/DL (ref 0–1.2)
BILIRUB UR STRIP.AUTO-MCNC: NEGATIVE MG/DL
BUN SERPL-MCNC: 17 MG/DL (ref 6–23)
CALCIUM SERPL-MCNC: 9.8 MG/DL (ref 8.6–10.3)
CHLORIDE SERPL-SCNC: 104 MMOL/L (ref 98–107)
CHOLEST SERPL-MCNC: 140 MG/DL (ref 0–199)
CHOLESTEROL/HDL RATIO: 3.1
CO2 SERPL-SCNC: 28 MMOL/L (ref 21–32)
COLOR UR: YELLOW
CREAT SERPL-MCNC: 0.82 MG/DL (ref 0.5–1.05)
ERYTHROCYTE [DISTWIDTH] IN BLOOD BY AUTOMATED COUNT: 14.2 % (ref 11.5–14.5)
ERYTHROCYTE [SEDIMENTATION RATE] IN BLOOD BY WESTERGREN METHOD: 14 MM/H (ref 0–30)
GFR SERPL CREATININE-BSD FRML MDRD: 80 ML/MIN/1.73M*2
GLUCOSE SERPL-MCNC: 101 MG/DL (ref 74–99)
GLUCOSE UR STRIP.AUTO-MCNC: NEGATIVE MG/DL
HCT VFR BLD AUTO: 40.5 % (ref 36–46)
HDLC SERPL-MCNC: 45.1 MG/DL
HGB BLD-MCNC: 12.8 G/DL (ref 12–16)
KETONES UR STRIP.AUTO-MCNC: NEGATIVE MG/DL
LDLC SERPL CALC-MCNC: 67 MG/DL
LEUKOCYTE ESTERASE UR QL STRIP.AUTO: NEGATIVE
MCH RBC QN AUTO: 30.5 PG (ref 26–34)
MCHC RBC AUTO-ENTMCNC: 31.6 G/DL (ref 32–36)
MCV RBC AUTO: 97 FL (ref 80–100)
NITRITE UR QL STRIP.AUTO: NEGATIVE
NON HDL CHOLESTEROL: 95 MG/DL (ref 0–149)
NRBC BLD-RTO: 0 /100 WBCS (ref 0–0)
PH UR STRIP.AUTO: 6 [PH]
PLATELET # BLD AUTO: 294 X10*3/UL (ref 150–450)
POTASSIUM SERPL-SCNC: 4.3 MMOL/L (ref 3.5–5.3)
PROT SERPL-MCNC: 7.5 G/DL (ref 6.4–8.2)
PROT UR STRIP.AUTO-MCNC: NEGATIVE MG/DL
RBC # BLD AUTO: 4.19 X10*6/UL (ref 4–5.2)
RBC # UR STRIP.AUTO: NEGATIVE /UL
SODIUM SERPL-SCNC: 138 MMOL/L (ref 136–145)
SP GR UR STRIP.AUTO: 1.02
T4 FREE SERPL-MCNC: 0.85 NG/DL (ref 0.61–1.12)
TRIGL SERPL-MCNC: 140 MG/DL (ref 0–149)
TSH SERPL-ACNC: 1.76 MIU/L (ref 0.44–3.98)
UROBILINOGEN UR STRIP.AUTO-MCNC: <2 MG/DL
VLDL: 28 MG/DL (ref 0–40)
WBC # BLD AUTO: 3.6 X10*3/UL (ref 4.4–11.3)

## 2023-11-08 PROCEDURE — 36415 COLL VENOUS BLD VENIPUNCTURE: CPT

## 2023-11-08 PROCEDURE — 82306 VITAMIN D 25 HYDROXY: CPT

## 2023-11-09 ENCOUNTER — APPOINTMENT (OUTPATIENT)
Dept: PHYSICAL THERAPY | Facility: HOSPITAL | Age: 63
End: 2023-11-09
Payer: COMMERCIAL

## 2023-11-10 ENCOUNTER — OFFICE VISIT (OUTPATIENT)
Dept: ORTHOPEDIC SURGERY | Facility: CLINIC | Age: 63
End: 2023-11-10
Payer: COMMERCIAL

## 2023-11-10 VITALS — WEIGHT: 235 LBS | HEIGHT: 66 IN | BODY MASS INDEX: 37.77 KG/M2

## 2023-11-10 DIAGNOSIS — Z96.641 STATUS POST TOTAL HIP REPLACEMENT, RIGHT: Primary | ICD-10-CM

## 2023-11-10 PROCEDURE — 99024 POSTOP FOLLOW-UP VISIT: CPT | Performed by: ORTHOPAEDIC SURGERY

## 2023-11-10 PROCEDURE — 3075F SYST BP GE 130 - 139MM HG: CPT | Performed by: ORTHOPAEDIC SURGERY

## 2023-11-10 PROCEDURE — 3079F DIAST BP 80-89 MM HG: CPT | Performed by: ORTHOPAEDIC SURGERY

## 2023-11-10 ASSESSMENT — PAIN SCALES - GENERAL: PAINLEVEL_OUTOF10: 2

## 2023-11-10 ASSESSMENT — PAIN - FUNCTIONAL ASSESSMENT: PAIN_FUNCTIONAL_ASSESSMENT: 0-10

## 2023-11-10 NOTE — PROGRESS NOTES
Subjective    Patient ID: Cecelia Brito is a 63 y.o. female.    Chief Complaint: Follow-up of the Right Hip (S/P HANSEL )     Last Surgery: No surgery found  Last Surgery Date: No surgery found    HPI 6 weeks status post right total hip doing very well    Objective   Ortho Exam excellent range of motion walking very well with walker no swelling no distal edema    Image Results:  XR hip right 1 view  PROCEDURE:         HIP RT 1 VIEW W OR WO AP PELVIS - IXR  0124  REASON FOR EXAM: postop    RESULT: MRN: [id number]  Patient Name: AVIS BRITO    STUDY:  HIP RT 1 VIEW W OR WO AP PELVIS    INDICATION:  postop    COMPARISON:  April 18, 2023    ACCESSION NUMBER(S):  [id number]    ORDERING CLINICIAN:  IRMA CHRISTIAN    TECHNIQUE:  Views: AP    FINDINGS:  RESULT: Postsurgical changes from right total hip arthroplasty are noted.  Soft tissue gas is noted adjacent to the proximal right femur. There is  joint space narrowing and osteophyte formation of the left hip.    IMPRESSION:  Postsurgical changes from right total hip arthroplasty.    Dictation workstation:   [id number]    Original Interpreting Physician:   ALF ROMEO MD  Original Transcribed by/Date: MMODAL Sep 25 2023  7:23A  Original Electronically Signed by/Date: ALF ROMEO MD Sep 25 2023  10:20A    Addendum Interpreting Physician:  Addendum Transcribed by/Date: NO ADDENDUM  Addendum Electronically Signed by/Date:      Assessment/Plan doing very well.  Continuing therapy for abductor strengthening I will see her in 6 weeks  Encounter Diagnoses:  Status post total hip replacement, right    No orders of the defined types were placed in this encounter.    No follow-ups on file.

## 2023-11-14 ENCOUNTER — TREATMENT (OUTPATIENT)
Dept: PHYSICAL THERAPY | Facility: HOSPITAL | Age: 63
End: 2023-11-14
Payer: COMMERCIAL

## 2023-11-14 DIAGNOSIS — Z96.641 STATUS POST TOTAL HIP REPLACEMENT, RIGHT: ICD-10-CM

## 2023-11-14 PROCEDURE — 97110 THERAPEUTIC EXERCISES: CPT | Mod: GP,CQ

## 2023-11-14 ASSESSMENT — PAIN SCALES - GENERAL: PAINLEVEL_OUTOF10: 1

## 2023-11-14 ASSESSMENT — PAIN - FUNCTIONAL ASSESSMENT: PAIN_FUNCTIONAL_ASSESSMENT: 0-10

## 2023-11-14 NOTE — PROGRESS NOTES
[facility]                 Therapy Communication Note    Patient Name: Cecelia Brito  MRN: [id number]  Today's Date: 11/07/23    Discipline: Physical Therapy    Missed Visit Reason:  Pt's follow up with Dr Mohamud was moved to 11-10-23.  Pt wants to wait until she gets approval for hip abduction strengthening before resuming PT.    Missed Time: Cancel    Comment:

## 2023-11-14 NOTE — PROGRESS NOTES
"Physical Therapy    Physical Therapy Treatment    Patient Name: Kelli Brito  MRN: [id number]  Today's Date: 11/14/2023  Time Calculation  Start Time: 1515  Stop Time: 1555  Time Calculation (min): 40 min      Assessment:Pt was able to perform strengthening Hip Abduction exercises initiated today with proper technique and no c/o pain in the R hip.    PT Assessment  PT Assessment Results: Decreased strength, Decreased range of motion, Decreased endurance, Impaired balance, Decreased mobility  Rehab Prognosis: Good    Plan:Continue to progress current POC as tolerated to facilitate ability to perform functional activities.   OP PT Plan  PT Plan: Skilled PT  PT Frequency: 2 times per week (6 of 12)    Current Problem  1. Status post total hip replacement, right  Follow Up In Physical Therapy          Subjective Pt had follow up with Dr Mohamud and is allowed to start Hip Abduction strengthening.  Pt states she is also allowed to drive and was able to without a problem. Pt states she missed PT last week d/t her appointment with Dr Mohamud getting moved to 11-10 instead of 11-6. Pt didn't want to continue PT until she was allowed to start hip abduction strengthening.  General  Reason for Referral: R HANSEL  Referred By: Dr. Mohamud  Precautions  Precautions  Post-Surgical Precautions: Right hip precautions       Pain  Pain Assessment: 0-10  Pain Score: 1  Pain Location: Hip  Pain Orientation: Right    Objective        Posture     Extremity/Trunk Assessment          Outcome Measures:      Treatments:  Therapeutic Exercise  Therapeutic Exercise Performed: Yes  Therapeutic Exercise Activity 1: Nustep L3 x 6'  Therapeutic Exercise Activity 2: side stepping 8' x 5  Therapeutic Exercise Activity 5: step ups 6\" x 20  Therapeutic Exercise Activity 10: LAQ 4# x 20  Therapeutic Exercise Activity 11: Hamstring curls green x 20  Therapeutic Exercise Activity 12: Rocking 1'  Therapeutic Exercise Activity 13: Hip flexor stance at door x " "10 5\"  Therapeutic Exercise Activity 14: Supine hip abduction x 20  Therapeutic Exercise Activity 15: H/L hip abduction Red band x 20  Therapeutic Exercise Activity 16: Bridge x 20 3\"  Therapeutic Exercise Activity 17: Lateral Step ups 2\" x 20       Activity:      OP EDUCATION:  Education  Individual(s) Educated: Patient  Education Provided: Home Exercise Program  Patient Response to Education: Patient/Caregiver Verbalized Understanding of Information    Goals:  Active       PT Problem       Patient will be I with HEP (Progressing)       Start:  10/17/23    Expected End:  10/31/23            Patient will ambulate community distances with std. cane (Progressing)       Start:  10/17/23    Expected End:  11/07/23            Patient will report no greater than 2/10 R hip pain with ambulating >/= 20 minutes in the community independently (Progressing)       Start:  10/17/23    Expected End:  11/28/23            Patient will demonstrate >/= 90 degrees of R hip flexion AROM to improve her ability to perform FA's, including transfers (Progressing)       Start:  10/17/23    Expected End:  11/28/23            Patient will demonstrate >/= 4+/5 R LE MMT to improve her ability to negotiate steps and ambulate community distances independently. (Progressing)       Start:  10/17/23    Expected End:  11/28/23            Patient will score >/= 40/80 on LEFS to improve her ability to perform FA's (Progressing)       Start:  10/17/23    Expected End:  11/28/23                   "

## 2023-11-16 ENCOUNTER — APPOINTMENT (OUTPATIENT)
Dept: PHYSICAL THERAPY | Facility: HOSPITAL | Age: 63
End: 2023-11-16
Payer: COMMERCIAL

## 2023-11-22 ENCOUNTER — ANCILLARY PROCEDURE (OUTPATIENT)
Dept: RADIOLOGY | Facility: CLINIC | Age: 63
End: 2023-11-22
Payer: COMMERCIAL

## 2023-11-22 VITALS — BODY MASS INDEX: 37.77 KG/M2 | WEIGHT: 235 LBS | HEIGHT: 66 IN

## 2023-11-22 DIAGNOSIS — Z12.31 SCREENING MAMMOGRAM FOR BREAST CANCER: Primary | ICD-10-CM

## 2023-11-22 DIAGNOSIS — Z12.31 ENCOUNTER FOR SCREENING MAMMOGRAM FOR MALIGNANT NEOPLASM OF BREAST: ICD-10-CM

## 2023-11-22 PROCEDURE — 77063 BREAST TOMOSYNTHESIS BI: CPT

## 2023-11-27 ENCOUNTER — TREATMENT (OUTPATIENT)
Dept: PHYSICAL THERAPY | Facility: HOSPITAL | Age: 63
End: 2023-11-27
Payer: COMMERCIAL

## 2023-11-27 DIAGNOSIS — Z96.641 STATUS POST TOTAL HIP REPLACEMENT, RIGHT: ICD-10-CM

## 2023-11-27 PROCEDURE — 97110 THERAPEUTIC EXERCISES: CPT | Mod: GP | Performed by: PHYSICAL THERAPIST

## 2023-11-27 ASSESSMENT — PAIN - FUNCTIONAL ASSESSMENT: PAIN_FUNCTIONAL_ASSESSMENT: 0-10

## 2023-11-27 ASSESSMENT — PAIN SCALES - GENERAL: PAINLEVEL_OUTOF10: 3

## 2023-11-27 NOTE — PROGRESS NOTES
"Physical Therapy    Physical Therapy Treatment    Patient Name: Kelli Brito  MRN: [id number]  Today's Date: 11/27/2023  Time Calculation  Start Time: 1427  Stop Time: 1512  Time Calculation (min): 45 min      Assessment:  PT Assessment  PT Assessment Results: Decreased strength, Decreased range of motion, Decreased endurance, Impaired balance, Decreased mobility  Rehab Prognosis: Good  Evaluation/Treatment Tolerance: Patient tolerated treatment well  Patient demonstrated a pronounced Trendelenburg gait to bilateral lower extremities. Patient demonstrated good tolerance to PROGRESSIVE RESISTED EXERCISE without complaints of increased pain.     Plan:  OP PT Plan  Treatment/Interventions: Cryotherapy, Education/ Instruction, Gait training, Hot pack, Manual therapy, Neuromuscular re-education, Therapeutic exercises  PT Plan: Skilled PT  PT Frequency: 2 times per week (7 of 12)  Duration: 6 weeks  Onset Date: 09/25/23  Rehab Potential: Good  Plan of Care Agreement: Patient    Current Problem  1. Status post total hip replacement, right  Follow Up In Physical Therapy          General  PT  Visit  PT Received On: 11/27/23  Response to Previous Treatment: Patient with no complaints from previous session.  General  Reason for Referral: R HANSEL  Referred By: Dr. Mohamud    Subjective    Precautions  Precautions  Post-Surgical Precautions: Right hip precautions  Patient states that she is having pain in her left hip from her IT band.    Pain  Pain Assessment  Pain Assessment: 0-10  Pain Score: 3  Pain Location: Leg  Pain Orientation: Left    Objective     Treatments:  Therapeutic Exercise  Therapeutic Exercise Performed: Yes  Therapeutic Exercise Activity 1: Nustep L3 x 6' seat # 9  Therapeutic Exercise Activity 2: side stepping 8' x4  Therapeutic Exercise Activity 5: step ups 6\" x 20 BLE  Therapeutic Exercise Activity 6: gastroc stretch on wedge x1'  Therapeutic Exercise Activity 7: sit to stand x 20  Therapeutic Exercise " "Activity 10: LAQ 4# x 20 BLE  Therapeutic Exercise Activity 11: Hamstring curls blue x 20  Therapeutic Exercise Activity 12: Rocking 1'  Therapeutic Exercise Activity 15: H/L hip abduction blue band x 20  Therapeutic Exercise Activity 16: Bridge red band  x20 3\"  Therapeutic Exercise Activity 17: Lateral Step ups 4\" x 20 BLE  Therapeutic Exercise Activity 18: mikayla arnoldg 8'x4 fwd/bwd  Therapeutic Exercise Activity 19: ITB stretch LLE 3x15\"    OP EDUCATION:  Outpatient Education  Individual(s) Educated: Patient  Education Provided: Home Exercise Program  Patient/Caregiver Demonstrated Understanding: yes  Plan of Care Discussed and Agreed Upon: yes  Patient Response to Education: Patient/Caregiver Verbalized Understanding of Information    Goals:  Active       PT Problem       Patient will be I with HEP (Progressing)       Start:  10/17/23    Expected End:  10/31/23            Patient will ambulate community distances with std. cane (Progressing)       Start:  10/17/23    Expected End:  11/07/23            Patient will report no greater than 2/10 R hip pain with ambulating >/= 20 minutes in the community independently (Progressing)       Start:  10/17/23    Expected End:  11/28/23            Patient will demonstrate >/= 90 degrees of R hip flexion AROM to improve her ability to perform FA's, including transfers (Progressing)       Start:  10/17/23    Expected End:  11/28/23            Patient will demonstrate >/= 4+/5 R LE MMT to improve her ability to negotiate steps and ambulate community distances independently. (Progressing)       Start:  10/17/23    Expected End:  11/28/23            Patient will score >/= 40/80 on LEFS to improve her ability to perform FA's (Progressing)       Start:  10/17/23    Expected End:  11/28/23                  "

## 2023-12-01 ENCOUNTER — TREATMENT (OUTPATIENT)
Dept: PHYSICAL THERAPY | Facility: HOSPITAL | Age: 63
End: 2023-12-01
Payer: COMMERCIAL

## 2023-12-01 DIAGNOSIS — Z96.641 STATUS POST TOTAL HIP REPLACEMENT, RIGHT: Primary | ICD-10-CM

## 2023-12-01 PROCEDURE — 97110 THERAPEUTIC EXERCISES: CPT | Mod: GP | Performed by: PHYSICAL THERAPIST

## 2023-12-01 ASSESSMENT — PAIN - FUNCTIONAL ASSESSMENT: PAIN_FUNCTIONAL_ASSESSMENT: 0-10

## 2023-12-01 ASSESSMENT — PAIN SCALES - GENERAL: PAINLEVEL_OUTOF10: 4

## 2023-12-01 NOTE — PROGRESS NOTES
Physical Therapy    Physical Therapy Treatment    Patient Name: Kelli Brito  MRN: [id number]  Today's Date: 12/1/2023  Time Calculation  Start Time: 1415  Stop Time: 1457  Time Calculation (min): 42 min    Assessment:  PT Assessment  PT Assessment Results: Decreased strength, Decreased range of motion, Decreased endurance, Impaired balance, Decreased mobility  Rehab Prognosis: Good  Evaluation/Treatment Tolerance: Patient tolerated treatment well  Patient demonstrated good tolerance to increased PROGRESSIVE RESISTED EXERCISE without complaints of increased pain. Patient demonstrated good ability to perform ITB stretch for her left lower extremity. Patient demonstrated an antalgic and trendelenburg gait pattern with and without use of single point cane. Skilled physical therapy is warranted to address the above stated impairments, so the patient can perform functional activities and work duties without increased pain or difficulty.    Plan:  OP PT Plan  Treatment/Interventions: Cryotherapy, Education/ Instruction, Gait training, Hot pack, Manual therapy, Neuromuscular re-education, Therapeutic exercises  PT Plan: Skilled PT  PT Frequency: 2 times per week (8 of 12)  Duration: 6 weeks  Onset Date: 09/25/23  Rehab Potential: Good  Plan of Care Agreement: Patient    Current Problem  1. Status post total hip replacement, right  Follow Up In Physical Therapy          General  PT  Visit  PT Received On: 12/01/23  General  Reason for Referral: R HANSEL  Referred By: Dr. Mohamud    Subjective    Patient states that her left lower extremity is giving her more problems than her right lower extremity at this point. Patient states that she is doing stretches to help decrease her ITB pain.    Pain  Pain Assessment  Pain Assessment: 0-10  Pain Score: 4 (left hip)  Pain Location: Leg  Pain Orientation: Left    Objective     Treatments:  Therapeutic Exercise  Therapeutic Exercise Performed: Yes  Therapeutic Exercise Activity 1:  "Nustep L3 x 6' seat # 9  Therapeutic Exercise Activity 2: side stepping yellow band 8' x7  Therapeutic Exercise Activity 5: step ups 6\" x 20 BLE  Therapeutic Exercise Activity 6: gastroc stretch on wedge x1'  Therapeutic Exercise Activity 9: supine piriformis stretch LLE 3x20\"  Therapeutic Exercise Activity 10: star trak LAQ x20# x20  Therapeutic Exercise Activity 11: Star trak hamstring curls 20# x 20 RLE  Therapeutic Exercise Activity 12: Rocking 1' R/L  Therapeutic Exercise Activity 14: standing hip abd x15 R/L  Therapeutic Exercise Activity 16: Bridge blue band  x20 3\"  Therapeutic Exercise Activity 17: Lateral Step ups 4\" x 20 BLE  Therapeutic Exercise Activity 18: zig zag 8'x4 yellow band fwd/bwd  Therapeutic Exercise Activity 20: standing hip ext x15 R/L    OP EDUCATION:  Outpatient Education  Individual(s) Educated: Patient  Education Provided: Home Exercise Program  Patient/Caregiver Demonstrated Understanding: yes  Plan of Care Discussed and Agreed Upon: yes  Patient Response to Education: Patient/Caregiver Verbalized Understanding of Information    Goals:  Active       PT Problem       Patient will be I with HEP (Met)       Start:  10/17/23    Expected End:  10/31/23    Resolved:  12/01/23         Patient will ambulate community distances with std. cane (Met)       Start:  10/17/23    Expected End:  11/07/23    Resolved:  12/01/23         Patient will report no greater than 2/10 R hip pain with ambulating >/= 20 minutes in the community independently (Met)       Start:  10/17/23    Expected End:  11/28/23    Resolved:  12/01/23         Patient will demonstrate >/= 90 degrees of R hip flexion AROM to improve her ability to perform FA's, including transfers (Progressing)       Start:  10/17/23    Expected End:  11/28/23            Patient will demonstrate >/= 4+/5 R LE MMT to improve her ability to negotiate steps and ambulate community distances independently. (Progressing)       Start:  10/17/23    " Expected End:  11/28/23            Patient will score >/= 40/80 on LEFS to improve her ability to perform FA's (Progressing)       Start:  10/17/23    Expected End:  11/28/23

## 2023-12-08 ENCOUNTER — TREATMENT (OUTPATIENT)
Dept: PHYSICAL THERAPY | Facility: HOSPITAL | Age: 63
End: 2023-12-08
Payer: COMMERCIAL

## 2023-12-08 DIAGNOSIS — Z96.641 STATUS POST TOTAL HIP REPLACEMENT, RIGHT: ICD-10-CM

## 2023-12-08 PROCEDURE — 97110 THERAPEUTIC EXERCISES: CPT | Mod: GP,CQ

## 2023-12-08 ASSESSMENT — PAIN - FUNCTIONAL ASSESSMENT: PAIN_FUNCTIONAL_ASSESSMENT: 0-10

## 2023-12-08 ASSESSMENT — PAIN SCALES - GENERAL: PAINLEVEL_OUTOF10: 0 - NO PAIN

## 2023-12-08 NOTE — PROGRESS NOTES
"Physical Therapy    Physical Therapy Treatment    Patient Name: Kelli Brito  MRN: [id number]  Today's Date: 12/8/2023  Time Calculation  Start Time: 0730  Stop Time: 0810  Time Calculation (min): 40 min      Assessment:Pt notes L knee pain abolished with trigger point release of the L TFL.  Advised pt to focus on minimizing gait deviations, pt verbalized good understanding.    PT Assessment  PT Assessment Results: Decreased strength, Decreased range of motion, Decreased endurance, Impaired balance, Decreased mobility  Rehab Prognosis: Good  Plan:Continue to progress current POC as tolerated to facilitate ability to perform functional activities.   OP PT Plan  PT Plan: Skilled PT  PT Frequency: 2 times per week (9 of 12)    Current Problem  1. Status post total hip replacement, right  Follow Up In Physical Therapy          General  PT  Visit  PT Received On: 12/08/23  Response to Previous Treatment: Compliant with home exercise program  General  Reason for Referral: R HANSEL  Referred By: Dr. Mohamud    Subjective  Pt states she is frustrated with the L lateral thigh pain. Pt states she uses the walker for distance when she goes to the store.  Precautions     Vital Signs     Pain  Pain Assessment  Pain Assessment: 0-10  Pain Score: 0 - No pain  Pain Location: Hip  Pain Orientation: Right    Objective   Cognition     Posture     Extremity/Trunk Assessment      Activity Tolerance:     Outcome Measures:    Treatments:  Therapeutic Exercise  Therapeutic Exercise Performed: Yes  Therapeutic Exercise Activity 1: Nustep L3 x 6' seat # 9  Therapeutic Exercise Activity 2: side stepping yellow band 8' x7  Therapeutic Exercise Activity 10: LAQ 20# x 20 R/L  Therapeutic Exercise Activity 11: Hamstring Curl 35# 2 x 10 R  Therapeutic Exercise Activity 15: clamshell  Therapeutic Exercise Activity 17: Lateral Step ups 4\" x 20 BLE  Therapeutic Exercise Activity 19: Anti Rotation double black  Therapeutic Exercise Activity 20: " standing hip ext x15 R/L    Manual Therapy  Manual Therapy Performed: Yes  Manual Therapy Activity 1: L TFL trigger point releaseto decrease pain    OP EDUCATION:       Goals:  Active       PT Problem       Patient will be I with HEP (Met)       Start:  10/17/23    Expected End:  10/31/23    Resolved:  12/01/23         Patient will ambulate community distances with std. cane (Met)       Start:  10/17/23    Expected End:  11/07/23    Resolved:  12/01/23         Patient will report no greater than 2/10 R hip pain with ambulating >/= 20 minutes in the community independently (Met)       Start:  10/17/23    Expected End:  11/28/23    Resolved:  12/01/23         Patient will demonstrate >/= 90 degrees of R hip flexion AROM to improve her ability to perform FA's, including transfers (Progressing)       Start:  10/17/23    Expected End:  11/28/23            Patient will demonstrate >/= 4+/5 R LE MMT to improve her ability to negotiate steps and ambulate community distances independently. (Progressing)       Start:  10/17/23    Expected End:  11/28/23            Patient will score >/= 40/80 on LEFS to improve her ability to perform FA's (Progressing)       Start:  10/17/23    Expected End:  11/28/23

## 2023-12-13 NOTE — PROGRESS NOTES
"Patient ID: Kelli Brito \"Camila" is a 63 y.o. female.  Primary Care Provider: Jaxon Giraldo MD    Subjective    HPI: 64 yo presenting in Gyn/Oncology consultation regarding newly diagnosed FIGO grade 1 endometrial cancer. Patient presented with a recent history of PMB to her primary care provider and subsequently underwent a TVUS demonstrating a uterine  mass. She had a D&C with Dr. Live with hysterectomy demonstrating FIGO grade 1 endometrial cancer and presents here for further disease management. She denies abdominopelvic pain fever, chills, nausea, vomiting, chest pain, and shortness of breath.  She reports PMB that has resolved since her D&C.       ============================================  Medical History:  - BMI 37  - HLD  - Anxiety disorder  - L4 and L5 degenerative disc disease  - Obstructive sleep apnea with nocturnal CPAP use     Surgical History:  - Nasal surgery ()  - D&C     Obstetric/Gynecologic History: , 2 full term spontaneous vaginal deliveries  - Menarche age 12  - Menopausal since  with no prior episodes of PMB   - Last Pap: 2021 normal, denies history of prior abnormal pap smear     Family History:   - Reports maternal grandmother with ovarian cancer. Otherwise denies family history of breast/colon/uterine cancers. Father  secondary to malignancy in the setting of scleroderma.      Social History: Presents today with her , Gilles.   - Tobacco: Denies  - Alcohol: Denies  - Illicit substances, narcotics: Denies     Health Maintenance:   - Last mammogram: 2021  - Last colonoscopy/CRC screening: Cologua2021 normal, no screening colonoscopy     Code Status: Full      Objective    Visit Vitals  /82 (BP Location: Right arm, Patient Position: Sitting, BP Cuff Size: Adult)   Pulse 83   Resp 20        Interval history:  Patient is a 63 year old female presents today for surveillance examination in the setting of stage IB grade 1 " endometrioid carcinoma s/p hysterectomy and s/p whole pelvic radiation completed 11/2022. Here for 3 month follow up.  Patient recently had right hip replacement, her pain is much improved but left hip is now having discomfort.   Patient denies any vaginal bleeding, pink tinged discharge. Patient denies any constipation or diarrhea.  Appetite has been good.  Energy levels are baseline.  Patient denies hematuria.  Patient has stress incontinence.  Mammogram is up to date.  Patient is not currently sexually active.      Physical Exam:    Constitutional: Doing well. ARIAN  Eyes: PERRL  ENMT: Moist mucus membranes  Head/Neck: Supple. Symmetrical  Cardiovascular: Regular, rate and rhythm. 2+ equal pulses of the extremities  Respiratory/Thorax: CTA. RRR. Chest rise symmetrical.  Gastrointestinal: Non-distended, soft, non-tender  Genitourinary:   Normal external female genitalia. No vulvar lesions noted  Speculum exam: Smooth vaginal walls without lesions or masses. Vaginal cuff visualized without lesions, radiation changes noted.   Bimanual exam: Smooth vaginal wall without lesions or masses.  Surgically absent uterus, cervix, and adnexa.    Rectovaginal exam: smooth rectovaginal septum without lesions or masses  Musculoskeletal: ROM intact, no joint swelling, normal strength  Extremities: No edema  Neurological: Alert and oriented x 3. Pleasant and cooperative.  Lymphatic: No lymphadenopathy. No lymphedema  Psychological: Appropriate mood and behavior  Skin: Warm and dry, no lesions, no rashes    A complete review of systems was performed and all systems were normal except what is noted in the interval history.          Assessment/Plan  Patient is a 63 year old female presents today for surveillance examination in the setting of stage IB grade 1 endometrioid carcinoma s/p hysterectomy and s/p whole pelvic radiation completed 11/2022. ARIAN 1 year.       PLAN:  F/U in 3-4 months or as needed  Physical examination was within  normal limits today.  She is currently ARIAN.  We reviewed signs and symptoms of possible recurrence with the patient and she will call our office should she experience any of these.

## 2023-12-14 ENCOUNTER — OFFICE VISIT (OUTPATIENT)
Dept: GYNECOLOGIC ONCOLOGY | Facility: CLINIC | Age: 63
End: 2023-12-14
Payer: COMMERCIAL

## 2023-12-14 VITALS
WEIGHT: 240.96 LBS | DIASTOLIC BLOOD PRESSURE: 82 MMHG | HEART RATE: 83 BPM | SYSTOLIC BLOOD PRESSURE: 140 MMHG | HEIGHT: 66 IN | RESPIRATION RATE: 20 BRPM | BODY MASS INDEX: 38.73 KG/M2

## 2023-12-14 DIAGNOSIS — C54.1 ENDOMETRIAL CANCER (MULTI): Primary | ICD-10-CM

## 2023-12-14 PROCEDURE — 1036F TOBACCO NON-USER: CPT | Performed by: NURSE PRACTITIONER

## 2023-12-14 PROCEDURE — 3077F SYST BP >= 140 MM HG: CPT | Performed by: NURSE PRACTITIONER

## 2023-12-14 PROCEDURE — 3079F DIAST BP 80-89 MM HG: CPT | Performed by: NURSE PRACTITIONER

## 2023-12-14 PROCEDURE — 99213 OFFICE O/P EST LOW 20 MIN: CPT | Performed by: NURSE PRACTITIONER

## 2023-12-14 PROCEDURE — 3008F BODY MASS INDEX DOCD: CPT | Performed by: NURSE PRACTITIONER

## 2023-12-14 ASSESSMENT — PAIN SCALES - GENERAL: PAINLEVEL: 0-NO PAIN

## 2023-12-15 ENCOUNTER — APPOINTMENT (OUTPATIENT)
Dept: PHYSICAL THERAPY | Facility: HOSPITAL | Age: 63
End: 2023-12-15
Payer: COMMERCIAL

## 2023-12-15 ENCOUNTER — DOCUMENTATION (OUTPATIENT)
Dept: PHYSICAL THERAPY | Facility: HOSPITAL | Age: 63
End: 2023-12-15
Payer: COMMERCIAL

## 2023-12-15 NOTE — PROGRESS NOTES
"Physical Therapy                 Therapy Communication Note    Patient Name: Kelli Brito \"Cecelia\"  MRN: [id number]  Today's Date: 12/15/2023     Discipline: Physical Therapy    Missed Visit Reason:  unknown reason    Missed Time: [id number]    Comment:  "

## 2023-12-19 ENCOUNTER — OFFICE VISIT (OUTPATIENT)
Dept: ORTHOPEDIC SURGERY | Facility: CLINIC | Age: 63
End: 2023-12-19
Payer: COMMERCIAL

## 2023-12-19 ENCOUNTER — ANCILLARY PROCEDURE (OUTPATIENT)
Dept: RADIOLOGY | Facility: CLINIC | Age: 63
End: 2023-12-19
Payer: COMMERCIAL

## 2023-12-19 DIAGNOSIS — M25.552 LEFT HIP PAIN: ICD-10-CM

## 2023-12-19 PROCEDURE — 1036F TOBACCO NON-USER: CPT | Performed by: ORTHOPAEDIC SURGERY

## 2023-12-19 PROCEDURE — 99024 POSTOP FOLLOW-UP VISIT: CPT | Performed by: ORTHOPAEDIC SURGERY

## 2023-12-19 PROCEDURE — 73502 X-RAY EXAM HIP UNI 2-3 VIEWS: CPT | Mod: LT,FY

## 2023-12-19 PROCEDURE — 73502 X-RAY EXAM HIP UNI 2-3 VIEWS: CPT | Mod: LEFT SIDE | Performed by: RADIOLOGY

## 2023-12-19 PROCEDURE — 3008F BODY MASS INDEX DOCD: CPT | Performed by: ORTHOPAEDIC SURGERY

## 2023-12-19 PROCEDURE — 20610 DRAIN/INJ JOINT/BURSA W/O US: CPT | Performed by: ORTHOPAEDIC SURGERY

## 2023-12-19 RX ORDER — LIDOCAINE HYDROCHLORIDE 20 MG/ML
3 INJECTION, SOLUTION INFILTRATION; PERINEURAL
Status: COMPLETED | OUTPATIENT
Start: 2023-12-19 | End: 2023-12-19

## 2023-12-19 RX ORDER — TRIAMCINOLONE ACETONIDE 40 MG/ML
80 INJECTION, SUSPENSION INTRA-ARTICULAR; INTRAMUSCULAR
Status: COMPLETED | OUTPATIENT
Start: 2023-12-19 | End: 2023-12-19

## 2023-12-19 RX ORDER — KETOROLAC TROMETHAMINE 30 MG/ML
30 INJECTION, SOLUTION INTRAMUSCULAR; INTRAVENOUS
Status: COMPLETED | OUTPATIENT
Start: 2023-12-19 | End: 2023-12-19

## 2023-12-19 RX ADMIN — LIDOCAINE HYDROCHLORIDE 3 ML: 20 INJECTION, SOLUTION INFILTRATION; PERINEURAL at 09:44

## 2023-12-19 RX ADMIN — TRIAMCINOLONE ACETONIDE 80 MG: 40 INJECTION, SUSPENSION INTRA-ARTICULAR; INTRAMUSCULAR at 09:44

## 2023-12-19 RX ADMIN — KETOROLAC TROMETHAMINE 30 MG: 30 INJECTION, SOLUTION INTRAMUSCULAR; INTRAVENOUS at 09:44

## 2023-12-19 NOTE — PROGRESS NOTES
Subjective    Patient ID: Cecelia Brito is a 63 y.o. female.    Chief Complaint: Follow-up of the Right Hip (S/P HANSEL )     Last Surgery: No surgery found  Last Surgery Date: No surgery found    HPI she is in for her right total hip she is doing fine with this but she did fall on her left hip and has been hurting for quite some time previous x-rays did not show any specific issue but is continued to hurt    Objective   Ortho Exam hip shows good range of motion without significant pain in the groin she does have a lot of pain around the posterior aspect of the trochanter neurovascular intact right hip shows good range of motion without any pain    Image Results:  BI mammo bilateral screening tomosynthesis  Narrative: Interpreted By:  Alicia Mccauley,   STUDY:  BI MAMMO BILATERAL SCREENING TOMOSYNTHESIS;  11/22/2023 8:34 am      ACCESSION NUMBER(S):  [id number]      ORDERING CLINICIAN:  CASSANDRA MALAVE      INDICATION:  Screening.      Based on the Tyrer-Cuzick model for breast cancer risk assessment,  the patient's lifetime risk of breast cancer is 8.5%.      COMPARISON:  10/06/2022 09/29/2021, 07/21/2020, 04/30/2018      TECHNIQUE:  Digital routine screening MLO and CC projections were obtained  bilaterally. Tomosynthesis was also utilized. The images were  processed utilizing computer aided detection system.      FINDINGS:  Density:  The breast tissue is almost entirely fatty.      No suspicious masses or calcifications are identified. No interval  change is seen.      ACR breast density categoryA      Impression: No mammographic evidence of malignancy.      BI-RADS CATEGORY:      BI-RADS Category:  1 Negative.  Recommendation:  Routine Screening Mammogram in 1 Year.  Recommended Date:  1 Year.  Laterality:  Bilateral.      For any future breast imaging appointments, please call [phone] ([phone]).          MACRO:  None      Signed by: Alicia Mccauley 11/22/2023 9:27 AM  Dictation workstation:    [id number]      Assessment/Plan we repeated the x-rays today again nothing really shows up other than some arthritic changes that hip I told her this could be doing it but right now we will go do is when to give her a shot of point maximal tenderness laterally around the posterior trochanter we will get her to physical therapy to work on it    L Inj/Asp: L greater trochanteric bursa on 12/19/2023 9:44 AM  Indications: pain  Details: 22 G needle, lateral approach  Medications: 3 mL lidocaine 20 mg/mL (2 %); 30 mg ketorolac 30 mg/mL (1 mL); 80 mg triamcinolone acetonide 40 mg/mL       Encounter Diagnoses:  Left hip pain    Orders Placed This Encounter   • XR hip left with pelvis when performed 2 or 3 views   • Referral to Physical Therapy     No follow-ups on file.

## 2023-12-22 ENCOUNTER — TREATMENT (OUTPATIENT)
Dept: PHYSICAL THERAPY | Facility: HOSPITAL | Age: 63
End: 2023-12-22
Payer: COMMERCIAL

## 2023-12-22 DIAGNOSIS — G89.29 CHRONIC LEFT HIP PAIN: Primary | ICD-10-CM

## 2023-12-22 DIAGNOSIS — M25.552 CHRONIC LEFT HIP PAIN: Primary | ICD-10-CM

## 2023-12-22 DIAGNOSIS — Z96.641 STATUS POST TOTAL HIP REPLACEMENT, RIGHT: ICD-10-CM

## 2023-12-22 PROCEDURE — 97110 THERAPEUTIC EXERCISES: CPT | Mod: GP | Performed by: PHYSICAL THERAPIST

## 2023-12-22 ASSESSMENT — PAIN - FUNCTIONAL ASSESSMENT: PAIN_FUNCTIONAL_ASSESSMENT: 0-10

## 2023-12-22 ASSESSMENT — PAIN DESCRIPTION - DESCRIPTORS: DESCRIPTORS: ACHING;DULL

## 2023-12-22 ASSESSMENT — PAIN SCALES - GENERAL: PAINLEVEL_OUTOF10: 4

## 2023-12-22 NOTE — PROGRESS NOTES
"Physical Therapy    Physical Therapy Re-Evaluation and Treatment      Patient Name: Kelli Brito \"Cecelia\"  MRN: [id number]  Today's Date: 12/22/2023  Time Calculation  Start Time: 1031  Stop Time: 1116  Time Calculation (min): 45 min    Assessment:  PT Assessment  PT Assessment Results: Decreased strength, Decreased range of motion, Decreased endurance, Impaired balance, Decreased mobility  Rehab Prognosis: Good  Evaluation/Treatment Tolerance: Patient limited by pain   Patient demonstrated good progress with physical therapy. Patient's left hip appears to be her biggest limiting factor for improved mobility. Patient has an appointment with an orthopedic surgeon in a couple weeks to assess whether she is in need of a TOTAL HIP ARTHROPLASTY on her left lower extremity. Patient may benefit from pre-habilitation for her left hip if she is in need of a total hip arthroplasty. Patient was issued home exercise program to improve her strength in her bilateral lower extremities. Skilled physical therapy is warranted to address the above stated impairments, so the patient can perform functional activities and work duties without increased pain or difficulty.    Plan:  OP PT Plan  PT Plan: No Additional PT interventions required at this time  Onset Date: 09/25/23  Number of Treatments Authorized: 10 of 12  Plan of Care Agreement: Patient    Current Problem:   1. Chronic left hip pain        2. Status post total hip replacement, right  Follow Up In Physical Therapy          Subjective    General:  General  Reason for Referral: R HANSEL  Referred By: Dr. Mohamud  Patient states that her right hip feels really good. She states that her left hip hurts her the most. She states that her left hip limits her mobility.    Precautions:  Precautions  Post-Surgical Precautions: Right hip precautions  Precautions Comment: No R hip abduction until 6 weeks post op (11/6)    Pain:  Pain Assessment  Pain Assessment: 0-10  Pain Score: 4  Pain " "Location: Hip  Pain Orientation: Left  Pain Descriptors: Aching, Dull  Pain Frequency: Intermittent    Objective     Functional Rating Scale  LEFS   /80: 44    Hip AROM  R hip flexion: (125°): 103  L hip flexion: (125°): 96  R hip abduction: (45°): 42  L hip abduction: (45°): 32  R hip ER: (45°): 33  L hip ER: (45°): 40  R hip IR: (45°): 32  L hip IR: (45°): 25    Specific Lower Extremity MMT  R Iliopsoas: (5/5): 4+/58  L Iliopsoas: (5/5): 5/5  R knee flexion: (5/5): 5/5  L knee flexion: (5/5): 5/5  R knee extension: (5/5): 5/5  L knee extension: (5/5): 5/5    Treatments:  Therapeutic Exercise  Therapeutic Exercise Performed: Yes  Therapeutic Exercise Activity 1: Nustep L3 x 6' seat # 9  Therapeutic Exercise Activity 3: standing hip abd x20 LLE  Therapeutic Exercise Activity 4: standing hip flex x20 BLE  Therapeutic Exercise Activity 6: gastroc stretch on wedge x1'  Therapeutic Exercise Activity 10: LAQ 5# x 20 R/L  Therapeutic Exercise Activity 11: standing Hamstring Curl 5# x20 BLE  Therapeutic Exercise Activity 16: Bridge blue band  x20 3\"  Therapeutic Exercise Activity 20: standing hip ext x20 LE    EDUCATION:  Outpatient Education  Individual(s) Educated: Patient  Education Provided: Home Exercise Program, POC  Patient/Caregiver Demonstrated Understanding: yes  Plan of Care Discussed and Agreed Upon: yes  Patient Response to Education: Patient/Caregiver Verbalized Understanding of Information    Goals:  Physical Therapy - Physical Therapy - October 2023 Problems       Physical Therapy - Physical Therapy - October 2023 Problems (Resolved)       PT Problem       Patient will be I with HEP (Met)       Start:  10/17/23    Expected End:  10/31/23    Resolved:  12/01/23         Patient will ambulate community distances with std. cane (Met)       Start:  10/17/23    Expected End:  11/07/23    Resolved:  12/01/23         Patient will report no greater than 2/10 R hip pain with ambulating >/= 20 minutes in the community " independently (Met)       Start:  10/17/23    Expected End:  11/28/23    Resolved:  12/01/23         Patient will demonstrate >/= 90 degrees of R hip flexion AROM to improve her ability to perform FA's, including transfers (Met)       Start:  10/17/23    Expected End:  11/28/23    Resolved:  12/22/23         Patient will demonstrate >/= 4+/5 R LE MMT to improve her ability to negotiate steps and ambulate community distances independently. (Met)       Start:  10/17/23    Expected End:  11/28/23    Resolved:  12/22/23         Patient will score >/= 40/80 on LEFS to improve her ability to perform FA's (Met)       Start:  10/17/23    Expected End:  11/28/23    Resolved:  12/22/23                  bilateral hip pain Problems       bilateral hip pain Problems (Active)       PT Problem       Patient will ambulate with normal gait pattern with no device community distances.       Start:  12/22/23    Expected End:  02/02/24            Patient will ascend and descend 1 flight of stairs with rail modified independent reciprocal pattern.       Start:  12/22/23    Expected End:  02/02/24            Patient will demonstrate independence in home program for support of progression       Start:  12/22/23    Expected End:  01/05/24            Patient will report pain of no more than 2/10 demonstrating a reduction of overall pain       Start:  12/22/23    Expected End:  02/02/24            Patient will be independent in basic self cares and instrumental activities of daily living       Start:  12/22/23    Expected End:  02/02/24            Patient will show a significant change in LEFS (44 to 53) patient reported outcome tool to demonstrate subjective imporovement       Start:  12/22/23    Expected End:  02/02/24

## 2024-01-02 ENCOUNTER — OFFICE VISIT (OUTPATIENT)
Dept: ORTHOPEDIC SURGERY | Facility: CLINIC | Age: 64
End: 2024-01-02
Payer: COMMERCIAL

## 2024-01-02 VITALS — BODY MASS INDEX: 38.74 KG/M2 | WEIGHT: 240 LBS

## 2024-01-02 DIAGNOSIS — M16.12 PRIMARY OSTEOARTHRITIS OF LEFT HIP: Primary | ICD-10-CM

## 2024-01-02 PROCEDURE — 3008F BODY MASS INDEX DOCD: CPT | Performed by: STUDENT IN AN ORGANIZED HEALTH CARE EDUCATION/TRAINING PROGRAM

## 2024-01-02 PROCEDURE — 1036F TOBACCO NON-USER: CPT | Performed by: STUDENT IN AN ORGANIZED HEALTH CARE EDUCATION/TRAINING PROGRAM

## 2024-01-02 PROCEDURE — 99214 OFFICE O/P EST MOD 30 MIN: CPT | Performed by: STUDENT IN AN ORGANIZED HEALTH CARE EDUCATION/TRAINING PROGRAM

## 2024-01-02 PROCEDURE — 99204 OFFICE O/P NEW MOD 45 MIN: CPT | Performed by: STUDENT IN AN ORGANIZED HEALTH CARE EDUCATION/TRAINING PROGRAM

## 2024-01-02 ASSESSMENT — PAIN SCALES - GENERAL: PAINLEVEL_OUTOF10: 4

## 2024-01-02 ASSESSMENT — PAIN - FUNCTIONAL ASSESSMENT: PAIN_FUNCTIONAL_ASSESSMENT: 0-10

## 2024-01-02 NOTE — PROGRESS NOTES
PRIMARY CARE PHYSICIAN: Jaxon Giraldo MD  REFERRING PROVIDER: No referring provider defined for this encounter.       SUBJECTIVE  CHIEF COMPLAINT:   Chief Complaint   Patient presents with    Left Hip - New Patient Visit        HPI: Kelli Brito is a pleasant 63 y.o. year-old female who is seen today for evaluation of left hip pain. The pain has been present since July. The onset of pain Was associated with a traumatic injury where she fell on her left side and dislocated her elbow.  Kelli Brito states that the pain is located on the lateral aspect of her hip and occasionally radiates to her back and groin.  Kelli Brito denies any history of inflammatory arthritis.  The pain is aggravated by weight bearing and stairs and alleviated by rest, tylenol and ibuprofen. The patient denies any numbness or tingling of the left lower extremity. She had a right HANSEL in September.    She has also had a history of left knee pain since her fall in July. She reports that she previously had a steroid injection into her left knee in September that did not alleviate the pain. She has a history of a left knee dislocation when she was 11.     Denies any history of heart attack, blood clots or strokes.     FUNCTIONAL STATUS: occasionally limited.  AMBULATORY STATUS: Independent community ambulation with canes/crutches  PREVIOUS TREATMENTS: over the counter medications   HISTORY OF SURGERY ON AFFECTED HIP(S): No   BACK PAIN REPORTED: Yes   SYMPTOMS INTERFERING WITH SLEEP: Yes   INSTABILITY: No   IMPACTING QUALITY OF LIFE: Yes     REVIEW OF SYSTEMS  The patient denies any fever, chills, chest pain, shortness of breath or difficulty breathing.  Patient denies any numbness, tingling, or radicular symptoms.  Adult patient history sheet was filled out by the patient today in clinic and will be scanned into the EMR.  I personally reviewed this form which will be scanned into the EMR.  This includes Past Medical History,  Past Surgical History, Medications, Allergies, Social History, Family History and 12 point review of systems.    Past Medical History:   Diagnosis Date    Anxiety disorder, unspecified 10/27/2015    Anxiety    Other seasonal allergic rhinitis 01/20/2015    Seasonal allergies    Personal history of malignant neoplasm, unspecified     History of malignant neoplasm    Personal history of other diseases of the circulatory system     History of hypertension    Personal history of other diseases of the digestive system     History of gastroesophageal reflux (GERD)    Personal history of other diseases of the musculoskeletal system and connective tissue     History of polymyalgia rheumatica    Personal history of other diseases of the nervous system and sense organs     History of sleep apnea    Personal history of urinary calculi     History of renal calculi    Uterine cancer (CMS/HCC)         Allergies   Allergen Reactions    Azithromycin Nausea Only and Unknown    Biotene Dry Mouth Unknown    Gabapentin Other and Unknown    Loratadine Unknown    Saliva Stimulant Comb. No.3 Unknown        Past Surgical History:   Procedure Laterality Date    HYSTERECTOMY      OTHER SURGICAL HISTORY  11/29/2022    Hysterectomy    OTHER SURGICAL HISTORY  11/29/2022    Nose surgery    OTHER SURGICAL HISTORY  11/29/2022    Dilation and curettage    US GUIDED FINE PERCUTANEOUS ASPIRATION  12/08/2022    US GUIDED FINE PERCUTANEOUS ASPIRATION [facility]        No family history on file.     Social History     Socioeconomic History    Marital status:      Spouse name: Not on file    Number of children: Not on file    Years of education: Not on file    Highest education level: Not on file   Occupational History    Not on file   Tobacco Use    Smoking status: Never    Smokeless tobacco: Never   Substance and Sexual Activity    Alcohol use: Not on file    Drug use: Not on file    Sexual activity: Not on file   Other Topics Concern     Not on file   Social History Narrative    Not on file     Social Determinants of Health     Financial Resource Strain: Not on file   Food Insecurity: Not on file   Transportation Needs: Not on file   Physical Activity: Not on file   Stress: Not on file   Social Connections: Not on file   Intimate Partner Violence: Not on file   Housing Stability: Not on file        CURRENT MEDICATIONS:   Current Outpatient Medications   Medication Sig Dispense Refill    ALPRAZolam (Xanax) 0.5 mg tablet 1/2 to 1 tab daily as needed      aspirin 81 mg EC tablet TAKE 1 TABLET BY MOUTH TWO TIMES A DAY 60 tablet 0    atorvastatin (Lipitor) 20 mg tablet 1 tablet orally Daily 90 tablet 0    sertraline (Zoloft) 100 mg tablet Take 2 tablets (200 mg) by mouth once daily.       No current facility-administered medications for this visit.        OBJECTIVE    PHYSICAL EXAM  Body mass index is 38.74 kg/m².    General: Well-appearing female in no acute distress.  Awake, alert and oriented.  Pleasant and cooperative.  Respiratory: Non-labored breathing  Mood: Euthymic   Gait: Antalgic   Assistive Device: cane    Limb Length Discrepancy: [None]     Affected Left Hip  Range of motion:   Flexion: [90]  Extension: [0]  Internal Rotation: [0]  External Rotation: [25]  Abduction: [20]  Hip Flexor Strength: 5/5  Abductor Strength: 5/5  Adductor Strength: 5/5  Tenderness: None  Sensation: Intact to light touch distally  Motor function: Able to fire TA, EHL, G/S  Pulses: Palpable DP pulse    Unaffected Right Hip  Range of motion:   Flexion: 120  Extension: 0  Internal Rotation: 20  External Rotation: 40  Abduction: 35  Hip Flexor Strength: 5/5  Abductor Strength: 5/5  Adductor Strength: 5/5  Tenderness: None  Sensation: Intact to light touch distally  Motor function: Able to fire TA, EHL, G/S    Left knee  Stable to varus and valgus stress at full extension and 30 degrees of flexion  Skin: Intact, no abrasions or draining sinuses  Effusion: None  Patella  Crepitus: None  Patella Grind: None  Tenderness: None      IMAGING:  AP pelvis, AP hip and false profile views: Independent review of left hip and pelvis x-rays was performed. The findings were reviewed with the patient. There are moderate degenerative changes of the left hip with associated joint space narrowing and subchondral sclerosis. No evidence of fracture, AVN, dislocation, osteomyelitis.    AP / lateral/ mid-flexion/sunrise views: Independent review of left knee x-rays was performed. The findings were reviewed with the patient. There are mild degenerative changes of the left knee with neutral  limb alignment. There is mild joint space narrowing and subchondral sclerosis. No evidence of fracture, AVN, dislocation, osteomyelitis.      ASSESSMENT & PLAN    IMPRESSION:  Kelli Brito is a 63 y.o. female with a a history of right HANSEL who presents for evaluation of left hip pain in the setting of moderate left hip arthritis.     PLAN:  I had detailed discussion with the patient regarding the possible etiologies of their pain.  We discussed that hip and buttock pain can be associated with either hip osteoarthritis or arthritis coming from the lumbar spine.  At this point in time is unclear as to which pathology is driving the symptoms.  We did discuss that a diagnostic and therapeutic hip injection may help us determine if the pain is coming from the low back or the hip.  An intra-articular injection would be performed and if the pain resolves, this would indicate that the pain is predominantly in the hip joint.  If there is no improvement in pain, this would point to the lumbar spine.  If the pain is indeed coming from the hip joint, a joint replacement surgery may be indicated for the patient.  I did briefly discussed with the patient the risks, benefits and postoperative course of a hip replacement surgery.    At this point I have recommended:  1.  Intra-articular injection with corticosteroid and  lidocaine    We will plan on re-assessing the status in 1 months, or sooner if her symptoms worsen.    The patient was seen and examined with my resident Dr. Barrow who assisted with documentation.  I independently interviewed the patient to obtain a history and performed physical examination.  I formulated the plan of care.    *This note was created using voice recognition software and was not corrected for typographical or grammatical errors.*

## 2024-01-04 ENCOUNTER — TELEPHONE (OUTPATIENT)
Dept: OTOLARYNGOLOGY | Facility: CLINIC | Age: 64
End: 2024-01-04
Payer: COMMERCIAL

## 2024-01-04 DIAGNOSIS — E04.1 THYROID NODULE: Primary | ICD-10-CM

## 2024-01-12 ENCOUNTER — OFFICE VISIT (OUTPATIENT)
Dept: ORTHOPEDIC SURGERY | Facility: CLINIC | Age: 64
End: 2024-01-12
Payer: COMMERCIAL

## 2024-01-12 DIAGNOSIS — M16.12 OSTEOARTHRITIS OF LEFT HIP, UNSPECIFIED OSTEOARTHRITIS TYPE: Primary | ICD-10-CM

## 2024-01-12 DIAGNOSIS — M25.552 LEFT HIP PAIN: ICD-10-CM

## 2024-01-12 PROCEDURE — 99203 OFFICE O/P NEW LOW 30 MIN: CPT | Performed by: EMERGENCY MEDICINE

## 2024-01-12 PROCEDURE — 20611 DRAIN/INJ JOINT/BURSA W/US: CPT | Performed by: EMERGENCY MEDICINE

## 2024-01-12 PROCEDURE — 1036F TOBACCO NON-USER: CPT | Performed by: EMERGENCY MEDICINE

## 2024-01-12 PROCEDURE — 3008F BODY MASS INDEX DOCD: CPT | Performed by: EMERGENCY MEDICINE

## 2024-01-12 RX ORDER — LIDOCAINE HYDROCHLORIDE 10 MG/ML
4 INJECTION INFILTRATION; PERINEURAL
Status: COMPLETED | OUTPATIENT
Start: 2024-01-12 | End: 2024-01-12

## 2024-01-12 RX ORDER — TRIAMCINOLONE ACETONIDE 40 MG/ML
80 INJECTION, SUSPENSION INTRA-ARTICULAR; INTRAMUSCULAR
Status: COMPLETED | OUTPATIENT
Start: 2024-01-12 | End: 2024-01-12

## 2024-01-12 RX ADMIN — TRIAMCINOLONE ACETONIDE 80 MG: 40 INJECTION, SUSPENSION INTRA-ARTICULAR; INTRAMUSCULAR at 11:35

## 2024-01-12 RX ADMIN — LIDOCAINE HYDROCHLORIDE 4 ML: 10 INJECTION INFILTRATION; PERINEURAL at 11:35

## 2024-01-12 ASSESSMENT — PAIN SCALES - GENERAL: PAINLEVEL_OUTOF10: 4

## 2024-01-12 ASSESSMENT — PAIN - FUNCTIONAL ASSESSMENT: PAIN_FUNCTIONAL_ASSESSMENT: 0-10

## 2024-01-12 NOTE — PROGRESS NOTES
"Subjective   Kelli Brito \"Cecelia\" is a 63 y.o. female who presents for Pain of the Left Hip (injections)    HPI  63-year-old female referred by Dr. Ernie Hollis for a left hip ultrasound-guided intra-articular injection.  Patient has had a right hip total replacement this past year.  She had a fall 2 months ago causing left groin pain.  Considering this, she was referred for a therapeutic corticosteroid injection.    ROS: All pertinent positive symptoms are included in the history of present illness.    All other systems have been reviewed and are negative and noncontributory to this patient's current ailments.    Objective     There were no vitals filed for this visit.    Physical Exam  General/Constitutional: No apparent distress. Well-nourished and well developed.  Head: Normocephalic, Atraumatic.   Eyes: EOMI.  Vascular: No edema, swelling or tenderness, except as noted in detailed exam.  Respiratory: Non-labored breathing.  Integumentary: No impressive skin lesions present, except as noted in detailed exam.  Neurological: Alert and oriented.  Psychological:  Normal mood and affect.  Musculoskeletal: Normal, except as noted in detailed exam.  Left hip: No rash.  No deformity.  No erythema.  Flexion 90.  Internal rotation 5.  External rotation 20.  Positive FADIR.    XR HIP LEFT WITH PELVIS WHEN PERFORMED 2 OR 3 VIEWS    - Impression -  Advanced osteoarthritis left hip.    Signed by: Nico Vidal 12/20/2023 1:01 PM  Dictation workstation:   [id number]    Patient ID: Kelli Brito \"Cecelia\" is a 63 y.o. female.    L Inj/Asp: L hip joint on 1/12/2024 11:35 AM  Indications: pain  Details: 20 G needle, ultrasound-guided anterior approach  Medications: 80 mg triamcinolone acetonide 40 mg/mL; 4 mL lidocaine 10 mg/mL (1 %)  Procedure, treatment alternatives, risks and benefits explained, specific risks discussed. Consent was given by the patient. Immediately prior to procedure a time out was called to verify the " correct patient, procedure, equipment, support staff and site/side marked as required. Patient was prepped and draped in the usual sterile fashion.           Assessment/Plan   Problem List Items Addressed This Visit    None  Visit Diagnoses       Left hip pain        Relevant Orders    Point of Care Ultrasound (Completed)          Discussed risks versus benefits of having injection performed. Patient has elected to proceed with procedure. Please refer to procedure note above. Discussed with patient to limit weightbearing activities over the next 24-48 hours, they can then progress to full activities as tolerated. Discussed with patient to avoid water submersion over the next 2 days. Discussed with patient to call me immediately if they develop worsening pain, rash, erythema, or fevers. Patient should follow-up with Dr. Ernie Hollis, or return in no sooner than 3 months for repeat injection if she would like.    Sudhir De La Rosa, DO  Sports Medicine  [facility]     ** Please excuse any errors in grammar or translation related to this dictation. Voice recognition software was utilized to prepare this document. **

## 2024-01-28 DIAGNOSIS — E78.2 MIXED HYPERLIPIDEMIA: ICD-10-CM

## 2024-01-29 RX ORDER — ATORVASTATIN CALCIUM 20 MG/1
TABLET, FILM COATED ORAL
Qty: 90 TABLET | Refills: 1 | Status: SHIPPED | OUTPATIENT
Start: 2024-01-29

## 2024-02-01 ENCOUNTER — DOCUMENTATION (OUTPATIENT)
Dept: PHYSICAL THERAPY | Facility: HOSPITAL | Age: 64
End: 2024-02-01
Payer: MEDICARE

## 2024-02-01 NOTE — PROGRESS NOTES
"Physical Therapy    Discharge Summary    Name: Kelli Brito \"Camila"  MRN: [id number]  : 1960  Date: 24    Discharge Summary: PT    Discharge Information: Date of discharge 2024, Date of last visit 2023, Date of evaluation 10/17/2023, Number of attended visits 10, Referred by Walker, and Referred for R hip pain    Therapy Summary: The patient is s/p R HANSEL on 2023 due to OA.     Discharge Status: Patient met the majority of PT goals. She is most limited due to the R hip. Patient      Rehab Discharge Reason: Achieved all and/or the most significant goals(s)  "

## 2024-02-20 ENCOUNTER — OFFICE VISIT (OUTPATIENT)
Dept: ORTHOPEDIC SURGERY | Facility: CLINIC | Age: 64
End: 2024-02-20
Payer: COMMERCIAL

## 2024-02-20 DIAGNOSIS — M16.12 PRIMARY OSTEOARTHRITIS OF LEFT HIP: Primary | ICD-10-CM

## 2024-02-20 PROCEDURE — 1126F AMNT PAIN NOTED NONE PRSNT: CPT | Performed by: STUDENT IN AN ORGANIZED HEALTH CARE EDUCATION/TRAINING PROGRAM

## 2024-02-20 PROCEDURE — 1159F MED LIST DOCD IN RCRD: CPT | Performed by: STUDENT IN AN ORGANIZED HEALTH CARE EDUCATION/TRAINING PROGRAM

## 2024-02-20 PROCEDURE — 99214 OFFICE O/P EST MOD 30 MIN: CPT | Performed by: STUDENT IN AN ORGANIZED HEALTH CARE EDUCATION/TRAINING PROGRAM

## 2024-02-20 PROCEDURE — 3008F BODY MASS INDEX DOCD: CPT | Performed by: STUDENT IN AN ORGANIZED HEALTH CARE EDUCATION/TRAINING PROGRAM

## 2024-02-20 PROCEDURE — 1036F TOBACCO NON-USER: CPT | Performed by: STUDENT IN AN ORGANIZED HEALTH CARE EDUCATION/TRAINING PROGRAM

## 2024-02-20 PROCEDURE — 1160F RVW MEDS BY RX/DR IN RCRD: CPT | Performed by: STUDENT IN AN ORGANIZED HEALTH CARE EDUCATION/TRAINING PROGRAM

## 2024-02-20 NOTE — PROGRESS NOTES
PRIMARY CARE PHYSICIAN: Jaxon Giraldo MD  REFERRING PROVIDER: No referring provider defined for this encounter.       SUBJECTIVE  CHIEF COMPLAINT:   Chief Complaint   Patient presents with    Left Hip - Follow-up        HPI: Kelli Brito is a pleasant 64 y.o. year-old female who is seen today for evaluation of left hip pain. The pain has been present since July. The onset of pain Was associated with a traumatic injury where she fell on her left side and dislocated her elbow.  Kelli Brito states that the pain is located on the lateral aspect of her hip and occasionally radiates to her back and groin.  Kelli Brito denies any history of inflammatory arthritis.  The pain is aggravated by weight bearing and stairs and alleviated by rest, tylenol and ibuprofen. The patient denies any numbness or tingling of the left lower extremity. She had a right HANSEL in September.    She has also had a history of left knee pain since her fall in July. She reports that she previously had a steroid injection into her left knee in September that did not alleviate the pain. She has a history of a left knee dislocation when she was 11.     Denies any history of heart attack, blood clots or strokes.     Update 2/20/24:  Patient presents for follow up of her L hip pain. She had an IA CSI on 1/12 which gave her 9 days of 100% relief. Unfortunately that relief was temporary. She arnold snot have DM, takes ASA daily, does not smoke. History of SHITAL on CPAP.     FUNCTIONAL STATUS: occasionally limited.  AMBULATORY STATUS: Independent community ambulation with canes/crutches  PREVIOUS TREATMENTS: activity modification, over the counter medications, and corticosteroid injections   HISTORY OF SURGERY ON AFFECTED HIP(S): No   BACK PAIN REPORTED: Yes   SYMPTOMS INTERFERING WITH SLEEP: Yes   INSTABILITY: No   IMPACTING QUALITY OF LIFE: Yes     REVIEW OF SYSTEMS  The patient denies any fever, chills, chest pain, shortness of breath  or difficulty breathing.  Patient denies any numbness, tingling, or radicular symptoms.  Adult patient history sheet was filled out by the patient today in clinic and will be scanned into the EMR.  I personally reviewed this form which will be scanned into the EMR.  This includes Past Medical History, Past Surgical History, Medications, Allergies, Social History, Family History and 12 point review of systems.    Past Medical History:   Diagnosis Date    Anxiety disorder, unspecified 10/27/2015    Anxiety    Other seasonal allergic rhinitis 01/20/2015    Seasonal allergies    Personal history of malignant neoplasm, unspecified     History of malignant neoplasm    Personal history of other diseases of the circulatory system     History of hypertension    Personal history of other diseases of the digestive system     History of gastroesophageal reflux (GERD)    Personal history of other diseases of the musculoskeletal system and connective tissue     History of polymyalgia rheumatica    Personal history of other diseases of the nervous system and sense organs     History of sleep apnea    Personal history of urinary calculi     History of renal calculi    Uterine cancer (CMS/HCC)         Allergies   Allergen Reactions    Azithromycin Nausea Only and Unknown    Biotene Dry Mouth Unknown    Gabapentin Other and Unknown    Loratadine Unknown    Saliva Stimulant Comb. No.3 Unknown        Past Surgical History:   Procedure Laterality Date    HYSTERECTOMY      OTHER SURGICAL HISTORY  11/29/2022    Hysterectomy    OTHER SURGICAL HISTORY  11/29/2022    Nose surgery    OTHER SURGICAL HISTORY  11/29/2022    Dilation and curettage    US GUIDED FINE PERCUTANEOUS ASPIRATION  12/08/2022    US GUIDED FINE PERCUTANEOUS ASPIRATION [facility]        No family history on file.     Social History     Socioeconomic History    Marital status:      Spouse name: Not on file    Number of children: Not on file    Years of  education: Not on file    Highest education level: Not on file   Occupational History    Not on file   Tobacco Use    Smoking status: Never    Smokeless tobacco: Never   Substance and Sexual Activity    Alcohol use: Not on file    Drug use: Not on file    Sexual activity: Not on file   Other Topics Concern    Not on file   Social History Narrative    Not on file     Social Determinants of Health     Financial Resource Strain: Not on file   Food Insecurity: Not on file   Transportation Needs: Not on file   Physical Activity: Not on file   Stress: Not on file   Social Connections: Not on file   Intimate Partner Violence: Not on file   Housing Stability: Not on file        CURRENT MEDICATIONS:   Current Outpatient Medications   Medication Sig Dispense Refill    ALPRAZolam (Xanax) 0.5 mg tablet 1/2 to 1 tab daily as needed      aspirin 81 mg EC tablet TAKE 1 TABLET BY MOUTH TWO TIMES A DAY 60 tablet 0    atorvastatin (Lipitor) 20 mg tablet TAKE 1 TABLET BY MOUTH EVERY DAY 90 tablet 1    sertraline (Zoloft) 100 mg tablet Take 2 tablets (200 mg) by mouth once daily.       No current facility-administered medications for this visit.        OBJECTIVE    PHYSICAL EXAM  There is no height or weight on file to calculate BMI.    General: Well-appearing female in no acute distress.  Awake, alert and oriented.  Pleasant and cooperative.  Respiratory: Non-labored breathing  Mood: Euthymic   Gait: Antalgic   Assistive Device: cane    Limb Length Discrepancy: 3 mm    Affected Left Hip  Range of motion:   Flexion: 90  Extension: 0  Internal Rotation: 5  External Rotation: 15  Abduction: 20  Hip Flexor Strength: 5/5  Abductor Strength: 5/5  Adductor Strength: 5/5  Tenderness: None  Sensation: Intact to light touch distally  Motor function: Able to fire TA, EHL, G/S  Pulses: Palpable DP pulse  +C sign  +stinchfield    IMAGING:  AP pelvis, AP hip and false profile views: Independent review of left hip and pelvis x-rays was performed.  The findings were reviewed with the patient. There are moderate degenerative changes of the left hip with associated joint space narrowing and subchondral sclerosis. No evidence of fracture, AVN, dislocation, osteomyelitis.    AP / lateral/ mid-flexion/sunrise views: Independent review of left knee x-rays was performed. The findings were reviewed with the patient. There are mild degenerative changes of the left knee with neutral  limb alignment. There is mild joint space narrowing and subchondral sclerosis. No evidence of fracture, AVN, dislocation, osteomyelitis.      ASSESSMENT & PLAN    IMPRESSION:  Kelli Brito is a 64 y.o. female with a a history of right HANSEL who presents for evaluation of left hip pain in the setting of moderate left hip arthritis.  The patient had excellent relief from an intra-articular injection.  Therefore, I believe it is reasonable to move forward with left total hip arthroplasty.  Patient understands that we have to wait 4 months from the time of the injection to move forward with total hip arthroplasty.    PLAN:  Kelli Brito for more than six months has had limited function as well as persistent and severe pain which has negatively impacted the quality of life and interfered with activities of daily living. Under my care or the care of other providers, for greater than the three months, conservative treatment including activity modification, over the counter pain medications, physical therapy and/or recommended home exercise program, have provided only minimal relief. The option to continue with conservative measures in lieu of arthroplasty was discussed and offered. However, given the failure of these conservative measures and the clinical and radiographic evidence of end-stage arthritis, the patient is a good candidate for an elective total hip arthroplasty. The stated potential benefits include pain relief, a feeling of improved joint motion and improved function were  discussed but no guarantees were offered.    Plan for left total hip arthroplasty at [facility].  Overnight admission.  Must be 4 months from last intra-articular injection in January.    The patient was seen and examined with my resident Dr. Sanchez who assisted with documentation.  I independently interviewed the patient to obtain a history and performed physical examination.  I formulated the plan of care.    *This note was created using voice recognition software and was not corrected for typographical or grammatical errors.*

## 2024-02-21 ENCOUNTER — HOSPITAL ENCOUNTER (OUTPATIENT)
Dept: RADIOLOGY | Facility: HOSPITAL | Age: 64
Discharge: HOME | End: 2024-02-21
Payer: MEDICARE

## 2024-02-21 DIAGNOSIS — E04.1 THYROID NODULE: ICD-10-CM

## 2024-02-21 PROCEDURE — 76536 US EXAM OF HEAD AND NECK: CPT

## 2024-02-21 PROCEDURE — 76536 US EXAM OF HEAD AND NECK: CPT | Performed by: RADIOLOGY

## 2024-02-27 ENCOUNTER — OFFICE VISIT (OUTPATIENT)
Dept: OTOLARYNGOLOGY | Facility: CLINIC | Age: 64
End: 2024-02-27
Payer: COMMERCIAL

## 2024-02-27 VITALS — BODY MASS INDEX: 38.57 KG/M2 | HEIGHT: 66 IN | WEIGHT: 240 LBS | TEMPERATURE: 96.6 F

## 2024-02-27 DIAGNOSIS — E04.1 THYROID NODULE: Primary | ICD-10-CM

## 2024-02-27 PROCEDURE — 1159F MED LIST DOCD IN RCRD: CPT | Performed by: OTOLARYNGOLOGY

## 2024-02-27 PROCEDURE — 1126F AMNT PAIN NOTED NONE PRSNT: CPT | Performed by: OTOLARYNGOLOGY

## 2024-02-27 PROCEDURE — 99214 OFFICE O/P EST MOD 30 MIN: CPT | Performed by: OTOLARYNGOLOGY

## 2024-02-27 PROCEDURE — 3008F BODY MASS INDEX DOCD: CPT | Performed by: OTOLARYNGOLOGY

## 2024-02-27 PROCEDURE — 1036F TOBACCO NON-USER: CPT | Performed by: OTOLARYNGOLOGY

## 2024-02-27 PROCEDURE — 1160F RVW MEDS BY RX/DR IN RCRD: CPT | Performed by: OTOLARYNGOLOGY

## 2024-02-27 NOTE — PROGRESS NOTES
"SHAWN Brito \"Cecelia\" is a 64 y.o. female history of right-sided thyroid nodule with nondiagnostic FNA x 2.  We have agreed to watch it with serial ultrasound.  Ultrasound from January of this year demonstrates that the right-sided nodule is a bit smaller at 1.5 x 1.1 x 0.9 centimeters.  The left side has a nodule which is slightly larger at 1.5 cm but is TI RADS 3.  She has no symptoms.    Prior hx: She had a right-sided nodule seen on recent ultrasound which was 2 cm in size, isoechoic with microcalcifications and FNA was recommended. She has had thyroid labs done which were within normal limits. She has no family history of thyroid carcinoma.       Past Medical History:   Diagnosis Date    Anxiety disorder, unspecified 10/27/2015    Anxiety    Other seasonal allergic rhinitis 01/20/2015    Seasonal allergies    Personal history of malignant neoplasm, unspecified     History of malignant neoplasm    Personal history of other diseases of the circulatory system     History of hypertension    Personal history of other diseases of the digestive system     History of gastroesophageal reflux (GERD)    Personal history of other diseases of the musculoskeletal system and connective tissue     History of polymyalgia rheumatica    Personal history of other diseases of the nervous system and sense organs     History of sleep apnea    Personal history of urinary calculi     History of renal calculi    Sleep apnea     Uterine cancer (CMS/HCC)             Medications:     Current Outpatient Medications:     ALPRAZolam (Xanax) 0.5 mg tablet, 1/2 to 1 tab daily as needed, Disp: , Rfl:     aspirin 81 mg EC tablet, TAKE 1 TABLET BY MOUTH TWO TIMES A DAY, Disp: 60 tablet, Rfl: 0    atorvastatin (Lipitor) 20 mg tablet, TAKE 1 TABLET BY MOUTH EVERY DAY, Disp: 90 tablet, Rfl: 1    sertraline (Zoloft) 100 mg tablet, Take 2 tablets (200 mg) by mouth once daily., Disp: , Rfl:      Allergies:  Allergies   Allergen Reactions    " "Azithromycin Nausea Only and Unknown    Biotene Dry Mouth Unknown    Gabapentin Other and Unknown    Loratadine Unknown    Saliva Stimulant Comb. No.3 Unknown        Physical Exam:  Last Recorded Vitals  Temperature 35.9 °C (96.6 °F), height 1.676 m (5' 6\"), weight 109 kg (240 lb).  General:     General appearance: Well-developed, well-nourished in no acute distress.       Voice:  normal       Head/face: Normal appearance; nontender to palpation     Facial nerve function: Normal and symmetric bilaterally.    Oral/oropharynx:     Oral vestibule: Normal labial and gingival mucosa     Tongue/floor of mouth: Normal without lesion     Oropharynx: Clear.  No lesions present of the hard/soft palate, posterior pharynx    Neck:     Neck: Normal appearance, trachea midline     Salivary glands: Normal to palpation bilaterally     Lymph nodes: No cervical lymphadenopathy to palpation     Thyroid: No thyromegaly.  No palpable nodules     Range of motion: Normal    Neurological:     Cortical functions: Alert and oriented x3, appropriate affect       Larynx/hypopharynx:     Laryngeal findings: Mirror exam inadequate or limited secondary to enlarged base of tongue and/or excessive gagging    Ear:     Ear canal: Normal bilaterally.  Cerumen cleaned bilaterally with wire-loop     Tympanic membrane: Intact and mobile bilaterally     Pinna: Normal bilaterally     Hearing:  Gross hearing assessment normal by voice    Nose:     Visualized using: Anterior rhinoscopy     Nasopharynx: Inadequate mirror exam secondary to gag, anatomy.       Nasal dorsum: Nontraumatic midline appearance     Septum: Midline     Inferior turbinates: Normally sized     Mucosa: Bilateral, pink, normal appearing       Assessment/Plan   Recommend continue to monitor with ultrasound.  We will see her back in a year, sooner as needed.  Wax was cleaned today         Les Rees MD  "

## 2024-03-05 DIAGNOSIS — M16.12 PRIMARY OSTEOARTHRITIS OF LEFT HIP: ICD-10-CM

## 2024-04-01 NOTE — PROGRESS NOTES
"Patient ID: Kelli Brito \"Camila" is a 64 y.o. female.  Primary Care Provider: Jaxon Giraldo MD    Subjective    HPI: 62 yo presenting in Gyn/Oncology consultation regarding newly diagnosed FIGO grade 1 endometrial cancer. Patient presented with a recent history of PMB to her primary care provider and subsequently underwent a TVUS demonstrating a uterine  mass. She had a D&C with Dr. Live with hysterectomy demonstrating FIGO grade 1 endometrial cancer and presents here for further disease management. She denies abdominopelvic pain fever, chills, nausea, vomiting, chest pain, and shortness of breath.  She reports PMB that has resolved since her D&C.       ============================================  Medical History:  - BMI 37  - HLD  - Anxiety disorder  - L4 and L5 degenerative disc disease  - Obstructive sleep apnea with nocturnal CPAP use     Surgical History:  - Nasal surgery ()  - D&C     Obstetric/Gynecologic History: , 2 full term spontaneous vaginal deliveries  - Menarche age 12  - Menopausal since  with no prior episodes of PMB   - Last Pap: 2021 normal, denies history of prior abnormal pap smear     Family History:   - Reports maternal grandmother with ovarian cancer. Otherwise denies family history of breast/colon/uterine cancers. Father  secondary to malignancy in the setting of scleroderma.      Social History: Presents today with her , Gilles.   - Tobacco: Denies  - Alcohol: Denies  - Illicit substances, narcotics: Denies     Health Maintenance:   - Last mammogram: 2021  - Last colonoscopy/CRC screening: Cologua2021 normal, no screening colonoscopy     Code Status: Full      Objective    Visit Vitals  /82 (BP Location: Right arm, Patient Position: Sitting, BP Cuff Size: Adult long)   Pulse 71   Resp 18          Interval history:  Patient is a 63 year old female presents today for surveillance examination in the setting of stage IB grade " 1 endometrioid carcinoma s/p hysterectomy and s/p whole pelvic radiation completed 11/2022. Here for 3 month follow up.  Patient denies any vaginal bleeding, pink tinged discharge. Patient denies any constipation or diarrhea.  Appetite has been good.  Energy levels are baseline.  Patient denies hematuria.  Patient denies urinary leakage or incontinence. Mammogram is up to date, PCP orders.  Patient is not currently sexually active.       Physical Exam:    Constitutional: Doing well. ARIAN  Eyes: PERRL  ENMT: Moist mucus membranes  Head/Neck: Supple. Symmetrical  Cardiovascular: Regular, rate and rhythm. 2+ equal pulses of the extremities  Respiratory/Thorax: CTA. RRR. Chest rise symmetrical.  Gastrointestinal: Non-distended, soft, non-tender  Genitourinary:   Normal external female genitalia. No vulvar lesions noted  Speculum exam: Smooth vaginal walls without lesions or masses. Vaginal cuff visualized without lesions, radiation changes noted.   Bimanual exam: Smooth vaginal wall without lesions or masses.  Surgically absent uterus, cervix, and adnexa.    Rectovaginal exam: smooth rectovaginal septum without lesions or masses  Musculoskeletal: ROM intact, no joint swelling, normal strength  Extremities: No edema  Neurological: Alert and oriented x 3. Pleasant and cooperative.  Lymphatic: No lymphadenopathy. No lymphedema  Psychological: Appropriate mood and behavior  Skin: Bilateral groin erythematous rash noted    A complete review of systems was performed and all systems were normal except what is noted in the interval history.          Assessment/Plan  Patient is a 63 year old female presents today for surveillance examination in the setting of stage IB grade 1 endometrioid carcinoma s/p hysterectomy and s/p whole pelvic radiation completed 11/2022. ARIAN 1.5 years.  Yeast infection of the skin.     PLAN: Rx for Nystatin powder sent  F/U in 3 months or as needed  Physical examination was within normal limits today.   She is currently ARIAN.  We reviewed signs and symptoms of possible recurrence with the patient and she will call our office should she experience any of these.

## 2024-04-04 ENCOUNTER — OFFICE VISIT (OUTPATIENT)
Dept: GYNECOLOGIC ONCOLOGY | Facility: CLINIC | Age: 64
End: 2024-04-04
Payer: COMMERCIAL

## 2024-04-04 VITALS
HEART RATE: 71 BPM | WEIGHT: 242.6 LBS | OXYGEN SATURATION: 94 % | BODY MASS INDEX: 38.99 KG/M2 | SYSTOLIC BLOOD PRESSURE: 147 MMHG | DIASTOLIC BLOOD PRESSURE: 82 MMHG | RESPIRATION RATE: 18 BRPM | HEIGHT: 66 IN

## 2024-04-04 DIAGNOSIS — B37.2 YEAST INFECTION OF THE SKIN: Primary | ICD-10-CM

## 2024-04-04 DIAGNOSIS — C54.1 ENDOMETRIAL CANCER (MULTI): ICD-10-CM

## 2024-04-04 PROCEDURE — 1159F MED LIST DOCD IN RCRD: CPT | Performed by: NURSE PRACTITIONER

## 2024-04-04 PROCEDURE — 3008F BODY MASS INDEX DOCD: CPT | Performed by: NURSE PRACTITIONER

## 2024-04-04 PROCEDURE — 99459 PELVIC EXAMINATION: CPT | Performed by: NURSE PRACTITIONER

## 2024-04-04 PROCEDURE — 3079F DIAST BP 80-89 MM HG: CPT | Performed by: NURSE PRACTITIONER

## 2024-04-04 PROCEDURE — 1126F AMNT PAIN NOTED NONE PRSNT: CPT | Performed by: NURSE PRACTITIONER

## 2024-04-04 PROCEDURE — 99214 OFFICE O/P EST MOD 30 MIN: CPT | Performed by: NURSE PRACTITIONER

## 2024-04-04 PROCEDURE — 1160F RVW MEDS BY RX/DR IN RCRD: CPT | Performed by: NURSE PRACTITIONER

## 2024-04-04 PROCEDURE — 3077F SYST BP >= 140 MM HG: CPT | Performed by: NURSE PRACTITIONER

## 2024-04-04 RX ORDER — NYSTATIN 100000 [USP'U]/G
1 POWDER TOPICAL 2 TIMES DAILY
Qty: 15 G | Refills: 3 | Status: SHIPPED | OUTPATIENT
Start: 2024-04-04 | End: 2025-04-04

## 2024-04-04 ASSESSMENT — PAIN SCALES - GENERAL: PAINLEVEL: 0-NO PAIN

## 2024-07-10 NOTE — PROGRESS NOTES
"Patient ID: Kelli Brito \"Camila" is a 64 y.o. female.  Primary Care Provider: Jaxon Giraldo MD    Subjective    HPI: 64 yo presenting in Gyn/Oncology consultation regarding newly diagnosed FIGO grade 1 endometrial cancer. Patient presented with a recent history of PMB to her primary care provider and subsequently underwent a TVUS demonstrating a uterine  mass. She had a D&C with Dr. Live with hysterectomy demonstrating FIGO grade 1 endometrial cancer.     ============================================  Medical History:  - BMI 37  - HLD  - Anxiety disorder  - L4 and L5 degenerative disc disease  - Obstructive sleep apnea with nocturnal CPAP use     Surgical History:  - Nasal surgery ()  - D&C     Obstetric/Gynecologic History: , 2 full term spontaneous vaginal deliveries  - Menarche age 12  - Menopausal since  with no prior episodes of PMB   - Last Pap: 2021 normal, denies history of prior abnormal pap smear     Family History:   - Reports maternal grandmother with ovarian cancer. Otherwise denies family history of breast/colon/uterine cancers. Father  secondary to malignancy in the setting of scleroderma.      Social History: Presents today with her , Gilles.   - Tobacco: Denies  - Alcohol: Denies  - Illicit substances, narcotics: Denies     Health Maintenance:   - Last mammogram: 2021  - Last colonoscopy/CRC screening: Cologua2021 normal, no screening colonoscopy     Code Status: Full      Objective    Visit Vitals  BP (!) 170/91 (BP Location: Right arm, Patient Position: Sitting, BP Cuff Size: Adult)   Pulse 80   Resp 18          Interval history:  Patient is a 63 year old female presents today for surveillance examination in the setting of stage IB grade 1 endometrioid carcinoma s/p hysterectomy and s/p whole pelvic radiation completed 2022. Patient feels she has new high BP, having bilateral ankle swelling.  Patient denies any lightheadedness, " dizziness or headaches.  Patient has stress incontinence.  Patient is not currently sexually active, states she forgets to use vaginal dilator. Mammogram is up to date.  Patient denies any vaginal bleeding, pink tinged discharge. Patient denies any constipation or diarrhea.  Appetite has been good.  Energy levels are baseline.        Physical Exam:    Constitutional: Doing well. ARIAN  Eyes: PERRL  ENMT: Moist mucus membranes  Head/Neck: Supple. Symmetrical  Cardiovascular: Regular, rate and rhythm. 2+ equal pulses of the extremities  Respiratory/Thorax: CTA. RRR. Chest rise symmetrical.  Gastrointestinal: Non-distended, soft, non-tender  Genitourinary:   Normal external female genitalia. No vulvar lesions noted  Speculum exam: Smooth vaginal walls without lesions or masses. Vaginal cuff visualized without lesions, radiation changes noted.   Bimanual exam: Smooth vaginal wall without lesions or masses.  Surgically absent uterus, cervix, and adnexa.    Rectovaginal exam: smooth rectovaginal septum without lesions or masses  Musculoskeletal: ROM intact, no joint swelling, normal strength  Extremities: No edema  Neurological: Alert and oriented x 3. Pleasant and cooperative.  Lymphatic: No lymphadenopathy. No lymphedema  Psychological: Appropriate mood and behavior  Skin: Bilateral groin erythematous rash noted    A complete review of systems was performed and all systems were normal except what is noted in the interval history.          Assessment/Plan  Patient is a 63 year old female presents today for surveillance examination in the setting of stage IB grade 1 endometrioid carcinoma s/p hysterectomy and s/p whole pelvic radiation completed 11/2022. ARIAN 1.5 years.     PLAN: F/U in 4 months or as needed  Encouraged to make F/U with PCP for BP, will monitor daily at home  Physical examination was within normal limits today.  She is currently ARIAN.  We reviewed signs and symptoms of possible recurrence with the patient and  she will call our office should she experience any of these.

## 2024-07-11 ENCOUNTER — APPOINTMENT (OUTPATIENT)
Dept: GYNECOLOGIC ONCOLOGY | Facility: CLINIC | Age: 64
End: 2024-07-11
Payer: MEDICARE

## 2024-07-11 VITALS
WEIGHT: 243.8 LBS | HEIGHT: 66 IN | BODY MASS INDEX: 39.18 KG/M2 | RESPIRATION RATE: 18 BRPM | HEART RATE: 80 BPM | DIASTOLIC BLOOD PRESSURE: 91 MMHG | OXYGEN SATURATION: 94 % | SYSTOLIC BLOOD PRESSURE: 170 MMHG

## 2024-07-11 DIAGNOSIS — C54.1 ENDOMETRIAL CANCER (MULTI): Primary | ICD-10-CM

## 2024-07-11 PROCEDURE — 3080F DIAST BP >= 90 MM HG: CPT | Performed by: NURSE PRACTITIONER

## 2024-07-11 PROCEDURE — 3008F BODY MASS INDEX DOCD: CPT | Performed by: NURSE PRACTITIONER

## 2024-07-11 PROCEDURE — 99459 PELVIC EXAMINATION: CPT | Performed by: NURSE PRACTITIONER

## 2024-07-11 PROCEDURE — 3077F SYST BP >= 140 MM HG: CPT | Performed by: NURSE PRACTITIONER

## 2024-07-11 PROCEDURE — 99213 OFFICE O/P EST LOW 20 MIN: CPT | Performed by: NURSE PRACTITIONER

## 2024-07-11 PROCEDURE — 1126F AMNT PAIN NOTED NONE PRSNT: CPT | Performed by: NURSE PRACTITIONER

## 2024-07-11 ASSESSMENT — PAIN SCALES - GENERAL: PAINLEVEL: 0-NO PAIN

## 2024-08-02 DIAGNOSIS — Z47.1 AFTERCARE FOLLOWING JOINT REPLACEMENT SURGERY, UNSPECIFIED JOINT: ICD-10-CM

## 2024-08-02 RX ORDER — AMOXICILLIN 500 MG/1
CAPSULE ORAL
Qty: 4 CAPSULE | Refills: 6 | Status: SHIPPED | OUTPATIENT
Start: 2024-08-02

## 2024-08-19 DIAGNOSIS — E78.2 MIXED HYPERLIPIDEMIA: ICD-10-CM

## 2024-08-19 RX ORDER — ATORVASTATIN CALCIUM 20 MG/1
TABLET, FILM COATED ORAL
Qty: 90 TABLET | Refills: 0 | Status: SHIPPED | OUTPATIENT
Start: 2024-08-19

## 2024-08-20 ENCOUNTER — LAB (OUTPATIENT)
Dept: LAB | Facility: LAB | Age: 64
End: 2024-08-20
Payer: MEDICARE

## 2024-08-20 ENCOUNTER — OFFICE VISIT (OUTPATIENT)
Dept: PRIMARY CARE | Facility: CLINIC | Age: 64
End: 2024-08-20
Payer: MEDICARE

## 2024-08-20 VITALS
BODY MASS INDEX: 39.71 KG/M2 | OXYGEN SATURATION: 99 % | WEIGHT: 246 LBS | SYSTOLIC BLOOD PRESSURE: 138 MMHG | DIASTOLIC BLOOD PRESSURE: 80 MMHG | HEART RATE: 75 BPM

## 2024-08-20 DIAGNOSIS — E04.1 THYROID NODULE: ICD-10-CM

## 2024-08-20 DIAGNOSIS — E55.9 VITAMIN D DEFICIENCY: ICD-10-CM

## 2024-08-20 DIAGNOSIS — R06.02 SHORTNESS OF BREATH: ICD-10-CM

## 2024-08-20 DIAGNOSIS — R60.0 LOCALIZED EDEMA: ICD-10-CM

## 2024-08-20 DIAGNOSIS — M35.3 PMR (POLYMYALGIA RHEUMATICA) (MULTI): ICD-10-CM

## 2024-08-20 DIAGNOSIS — R60.0 LOCALIZED EDEMA: Primary | ICD-10-CM

## 2024-08-20 DIAGNOSIS — E04.9 ENLARGED THYROID: Primary | ICD-10-CM

## 2024-08-20 DIAGNOSIS — C54.1 ENDOMETRIAL ADENOCARCINOMA (MULTI): ICD-10-CM

## 2024-08-20 DIAGNOSIS — R35.1 NOCTURIA: ICD-10-CM

## 2024-08-20 DIAGNOSIS — E78.2 MIXED HYPERLIPIDEMIA: ICD-10-CM

## 2024-08-20 DIAGNOSIS — Z12.31 SCREENING MAMMOGRAM FOR BREAST CANCER: ICD-10-CM

## 2024-08-20 DIAGNOSIS — E04.9 ENLARGED THYROID: ICD-10-CM

## 2024-08-20 DIAGNOSIS — E66.01 OBESITY, MORBID (MULTI): ICD-10-CM

## 2024-08-20 DIAGNOSIS — R53.83 FATIGUE, UNSPECIFIED TYPE: ICD-10-CM

## 2024-08-20 DIAGNOSIS — R79.89 ELEVATED BRAIN NATRIURETIC PEPTIDE (BNP) LEVEL: Primary | ICD-10-CM

## 2024-08-20 DIAGNOSIS — R00.2 PALPITATIONS: ICD-10-CM

## 2024-08-20 PROBLEM — I47.20 VENTRICULAR TACHYCARDIA (MULTI): Status: RESOLVED | Noted: 2023-10-19 | Resolved: 2024-08-20

## 2024-08-20 LAB
25(OH)D3 SERPL-MCNC: 42 NG/ML (ref 30–100)
ALBUMIN SERPL BCP-MCNC: 4.1 G/DL (ref 3.4–5)
ALP SERPL-CCNC: 85 U/L (ref 33–136)
ALT SERPL W P-5'-P-CCNC: 33 U/L (ref 7–45)
ANION GAP SERPL CALC-SCNC: 9 MMOL/L (ref 10–20)
APPEARANCE UR: CLEAR
AST SERPL W P-5'-P-CCNC: 34 U/L (ref 9–39)
BASOPHILS # BLD AUTO: 0.03 X10*3/UL (ref 0–0.1)
BASOPHILS NFR BLD AUTO: 0.7 %
BILIRUB SERPL-MCNC: 0.8 MG/DL (ref 0–1.2)
BILIRUB UR STRIP.AUTO-MCNC: NEGATIVE MG/DL
BNP SERPL-MCNC: 125 PG/ML (ref 0–99)
BUN SERPL-MCNC: 18 MG/DL (ref 6–23)
CALCIUM SERPL-MCNC: 10 MG/DL (ref 8.6–10.3)
CHLORIDE SERPL-SCNC: 105 MMOL/L (ref 98–107)
CHOLEST SERPL-MCNC: 113 MG/DL (ref 0–199)
CHOLESTEROL/HDL RATIO: 2.8
CO2 SERPL-SCNC: 29 MMOL/L (ref 21–32)
COLOR UR: COLORLESS
CREAT SERPL-MCNC: 0.64 MG/DL (ref 0.5–1.05)
EGFRCR SERPLBLD CKD-EPI 2021: >90 ML/MIN/1.73M*2
EOSINOPHIL # BLD AUTO: 0.13 X10*3/UL (ref 0–0.7)
EOSINOPHIL NFR BLD AUTO: 3.2 %
ERYTHROCYTE [DISTWIDTH] IN BLOOD BY AUTOMATED COUNT: 12.8 % (ref 11.5–14.5)
ERYTHROCYTE [SEDIMENTATION RATE] IN BLOOD BY WESTERGREN METHOD: 4 MM/H (ref 0–30)
GLUCOSE SERPL-MCNC: 99 MG/DL (ref 74–99)
GLUCOSE UR STRIP.AUTO-MCNC: NORMAL MG/DL
HCT VFR BLD AUTO: 38.4 % (ref 36–46)
HDLC SERPL-MCNC: 40.1 MG/DL
HGB BLD-MCNC: 12.4 G/DL (ref 12–16)
HOLD SPECIMEN: NORMAL
IMM GRANULOCYTES # BLD AUTO: 0.02 X10*3/UL (ref 0–0.7)
IMM GRANULOCYTES NFR BLD AUTO: 0.5 % (ref 0–0.9)
KETONES UR STRIP.AUTO-MCNC: NEGATIVE MG/DL
LDLC SERPL CALC-MCNC: 50 MG/DL
LEUKOCYTE ESTERASE UR QL STRIP.AUTO: ABNORMAL
LYMPHOCYTES # BLD AUTO: 1 X10*3/UL (ref 1.2–4.8)
LYMPHOCYTES NFR BLD AUTO: 24.3 %
MCH RBC QN AUTO: 29.5 PG (ref 26–34)
MCHC RBC AUTO-ENTMCNC: 32.3 G/DL (ref 32–36)
MCV RBC AUTO: 91 FL (ref 80–100)
MONOCYTES # BLD AUTO: 0.67 X10*3/UL (ref 0.1–1)
MONOCYTES NFR BLD AUTO: 16.3 %
MUCOUS THREADS #/AREA URNS AUTO: NORMAL /LPF
NEUTROPHILS # BLD AUTO: 2.26 X10*3/UL (ref 1.2–7.7)
NEUTROPHILS NFR BLD AUTO: 55 %
NITRITE UR QL STRIP.AUTO: NEGATIVE
NON HDL CHOLESTEROL: 73 MG/DL (ref 0–149)
NRBC BLD-RTO: 0 /100 WBCS (ref 0–0)
PH UR STRIP.AUTO: 6 [PH]
PLATELET # BLD AUTO: 232 X10*3/UL (ref 150–450)
POTASSIUM SERPL-SCNC: 4.3 MMOL/L (ref 3.5–5.3)
PROT SERPL-MCNC: 6.9 G/DL (ref 6.4–8.2)
PROT UR STRIP.AUTO-MCNC: NEGATIVE MG/DL
RBC # BLD AUTO: 4.21 X10*6/UL (ref 4–5.2)
RBC # UR STRIP.AUTO: NEGATIVE /UL
RBC #/AREA URNS AUTO: NORMAL /HPF
SODIUM SERPL-SCNC: 139 MMOL/L (ref 136–145)
SP GR UR STRIP.AUTO: 1.01
SQUAMOUS #/AREA URNS AUTO: NORMAL /HPF
T4 FREE SERPL-MCNC: 1.73 NG/DL (ref 0.61–1.12)
TRIGL SERPL-MCNC: 114 MG/DL (ref 0–149)
TSH SERPL-ACNC: <0.01 MIU/L (ref 0.44–3.98)
UROBILINOGEN UR STRIP.AUTO-MCNC: NORMAL MG/DL
VLDL: 23 MG/DL (ref 0–40)
WBC # BLD AUTO: 4.1 X10*3/UL (ref 4.4–11.3)
WBC #/AREA URNS AUTO: NORMAL /HPF

## 2024-08-20 PROCEDURE — 1160F RVW MEDS BY RX/DR IN RCRD: CPT | Performed by: FAMILY MEDICINE

## 2024-08-20 PROCEDURE — 1036F TOBACCO NON-USER: CPT | Performed by: FAMILY MEDICINE

## 2024-08-20 PROCEDURE — G2211 COMPLEX E/M VISIT ADD ON: HCPCS | Performed by: FAMILY MEDICINE

## 2024-08-20 PROCEDURE — 1125F AMNT PAIN NOTED PAIN PRSNT: CPT | Performed by: FAMILY MEDICINE

## 2024-08-20 PROCEDURE — 36415 COLL VENOUS BLD VENIPUNCTURE: CPT

## 2024-08-20 PROCEDURE — 86800 THYROGLOBULIN ANTIBODY: CPT

## 2024-08-20 PROCEDURE — 99214 OFFICE O/P EST MOD 30 MIN: CPT | Performed by: FAMILY MEDICINE

## 2024-08-20 PROCEDURE — 87086 URINE CULTURE/COLONY COUNT: CPT

## 2024-08-20 PROCEDURE — 1159F MED LIST DOCD IN RCRD: CPT | Performed by: FAMILY MEDICINE

## 2024-08-20 PROCEDURE — 82306 VITAMIN D 25 HYDROXY: CPT

## 2024-08-20 PROCEDURE — 3075F SYST BP GE 130 - 139MM HG: CPT | Performed by: FAMILY MEDICINE

## 2024-08-20 PROCEDURE — 3079F DIAST BP 80-89 MM HG: CPT | Performed by: FAMILY MEDICINE

## 2024-08-20 PROCEDURE — 86376 MICROSOMAL ANTIBODY EACH: CPT

## 2024-08-20 ASSESSMENT — ENCOUNTER SYMPTOMS
FREQUENCY: 0
NAUSEA: 0
OCCASIONAL FEELINGS OF UNSTEADINESS: 0
DEPRESSION: 0
WEAKNESS: 0
CHILLS: 0
FATIGUE: 1
CONSTIPATION: 0
WHEEZING: 0
ARTHRALGIAS: 1
HEMATURIA: 0
LOSS OF SENSATION IN FEET: 0
COUGH: 0
FEVER: 0
VOMITING: 0
SHORTNESS OF BREATH: 1
MYALGIAS: 1
PALPITATIONS: 1
DIZZINESS: 0
TREMORS: 0

## 2024-08-20 ASSESSMENT — PATIENT HEALTH QUESTIONNAIRE - PHQ9
10. IF YOU CHECKED OFF ANY PROBLEMS, HOW DIFFICULT HAVE THESE PROBLEMS MADE IT FOR YOU TO DO YOUR WORK, TAKE CARE OF THINGS AT HOME, OR GET ALONG WITH OTHER PEOPLE: SOMEWHAT DIFFICULT
6. FEELING BAD ABOUT YOURSELF - OR THAT YOU ARE A FAILURE OR HAVE LET YOURSELF OR YOUR FAMILY DOWN: NOT AT ALL
SUM OF ALL RESPONSES TO PHQ QUESTIONS 1-9: 4
9. THOUGHTS THAT YOU WOULD BE BETTER OFF DEAD, OR OF HURTING YOURSELF: NOT AT ALL
1. LITTLE INTEREST OR PLEASURE IN DOING THINGS: NOT AT ALL
7. TROUBLE CONCENTRATING ON THINGS, SUCH AS READING THE NEWSPAPER OR WATCHING TELEVISION: NOT AT ALL
3. TROUBLE FALLING OR STAYING ASLEEP OR SLEEPING TOO MUCH: NOT AT ALL
SUM OF ALL RESPONSES TO PHQ9 QUESTIONS 1 AND 2: 3
4. FEELING TIRED OR HAVING LITTLE ENERGY: SEVERAL DAYS
2. FEELING DOWN, DEPRESSED OR HOPELESS: NEARLY EVERY DAY
8. MOVING OR SPEAKING SO SLOWLY THAT OTHER PEOPLE COULD HAVE NOTICED. OR THE OPPOSITE, BEING SO FIGETY OR RESTLESS THAT YOU HAVE BEEN MOVING AROUND A LOT MORE THAN USUAL: NOT AT ALL
5. POOR APPETITE OR OVEREATING: NOT AT ALL

## 2024-08-20 ASSESSMENT — PAIN SCALES - GENERAL: PAINLEVEL: 5

## 2024-08-20 NOTE — PROGRESS NOTES
Subjective   Patient ID: Cecelia Brito is a 64 y.o. female who presents for Foot Swelling.     Thyroid Nodule:          Thyroid Nodule new onset 2 cm thyroid nodule - seen first on a CT of her chest - no symptoms of thyroid disease, she denies any pressure in her nek no troubles swallowing. Occasional palpitations.     Hyperlipidemia:          Patient here for F/U. tolerating medicine well, stable.          Labs ordered today.      Hip/Thigh:          Hip pain, right, lateral hip.          Thigh pain, right, lateral.          Pain lateral side of the hip, worse with activities.          Weakness none.          Radiation of pain right leg.          Tingling/numbness none.          she has tried PT with no real help - she has not gone recently - agrees to work on stretches - she is going for a hip replacement- she denies any chest pain, no shortness of breath, no personal or family history of PE/DVT, non smoker, no compliations to surgery or anesthesia.     GYN:          she had a THBSO due to endometrial cancer - she is going through 25 days of pelvic radiation, no chemo needed. SHe is feeling stable.     -Pulmonology:          Pulmonary nodule/mass she has a known history of pulomanry nodules and is due for a follow up CT chest.      Anxiety:          Anxiety F/U stable, at patient's baseline. No meds         Supports significant, support from spouse, significant, support from family.          Energy change decreased energy.          Concentration decreased.          Suicidal ideations none.      Polymyalgia Rheumatica:          she was diagnosed with PMR a little over a year ago - she is off of prednisone, feels good most days - she is trying to avoid flour and sugar - she is using the Bowersox regimen (she gets it online)      She feels she has increased fluid retention in her ankles, occasional shortness of breath, occasional palpitations         Review of Systems   Constitutional:  Positive for fatigue.  Negative for chills and fever.   HENT:  Negative for ear pain and postnasal drip.    Respiratory:  Positive for shortness of breath. Negative for cough and wheezing.    Cardiovascular:  Positive for palpitations and leg swelling. Negative for chest pain.   Gastrointestinal:  Negative for constipation, nausea and vomiting.   Genitourinary:  Negative for frequency and hematuria.   Musculoskeletal:  Positive for arthralgias and myalgias.   Neurological:  Negative for dizziness, tremors and weakness.   Nocturia, vit D def, borderline chol    Objective   /80   Pulse 75   Wt 112 kg (246 lb)   SpO2 99%   BMI 39.71 kg/m²     Physical Exam  Constitutional:       General: She is not in acute distress.     Appearance: Normal appearance. She is not ill-appearing.   HENT:      Head: Normocephalic.      Right Ear: Tympanic membrane normal.      Left Ear: Tympanic membrane normal.      Nose: Nose normal.      Mouth/Throat:      Mouth: Mucous membranes are moist.      Pharynx: No posterior oropharyngeal erythema.   Cardiovascular:      Rate and Rhythm: Normal rate and regular rhythm.      Pulses: Normal pulses.      Heart sounds: Normal heart sounds. No murmur heard.  Pulmonary:      Effort: Pulmonary effort is normal. No respiratory distress.      Breath sounds: Normal breath sounds. No wheezing.   Abdominal:      General: Bowel sounds are normal.      Palpations: Abdomen is soft.      Tenderness: There is no abdominal tenderness.      Hernia: No hernia is present.   Musculoskeletal:         General: Normal range of motion.      Cervical back: Neck supple.      Right lower leg: Edema present.      Left lower leg: Edema present.      Comments: Hip pain   Skin:     General: Skin is warm.      Findings: No rash.   Neurological:      General: No focal deficit present.      Mental Status: She is alert and oriented to person, place, and time.   Psychiatric:         Mood and Affect: Mood normal.         Behavior: Behavior  normal.         Assessment/Plan   Problem List Items Addressed This Visit             ICD-10-CM    Endometrial adenocarcinoma (Multi) C54.1    Hyperlipidemia E78.5    Relevant Orders    Comprehensive Metabolic Panel (Completed)    Lipid Panel (Completed)    Thyroid nodule E04.1    Vitamin D deficiency E55.9    Relevant Orders    Vitamin D 25-Hydroxy,Total (for eval of Vitamin D levels) (Completed)     Other Visit Diagnoses         Codes    Localized edema    -  Primary R60.0    Relevant Orders    B-type natriuretic peptide (Completed)    Shortness of breath     R06.02    Relevant Orders    B-type natriuretic peptide (Completed)    Fatigue, unspecified type     R53.83    Relevant Orders    CBC and Auto Differential (Completed)    TSH with reflex to Free T4 if abnormal (Completed)    Nocturia     R35.1    Relevant Orders    Urinalysis with Reflex Culture and Microscopic    PMR (polymyalgia rheumatica) (Multi)     M35.3    Relevant Orders    Sedimentation Rate (Completed)    Screening mammogram for breast cancer     Z12.31    Relevant Orders    BI mammo bilateral screening tomosynthesis

## 2024-08-21 ENCOUNTER — TELEPHONE (OUTPATIENT)
Dept: OTOLARYNGOLOGY | Facility: CLINIC | Age: 64
End: 2024-08-21
Payer: MEDICARE

## 2024-08-21 ENCOUNTER — PATIENT MESSAGE (OUTPATIENT)
Dept: PRIMARY CARE | Facility: CLINIC | Age: 64
End: 2024-08-21
Payer: MEDICARE

## 2024-08-21 DIAGNOSIS — E05.90 HYPERTHYROIDISM: Primary | ICD-10-CM

## 2024-08-21 LAB
BACTERIA UR CULT: NORMAL
THYROPEROXIDASE AB SERPL-ACNC: 39 IU/ML

## 2024-08-21 NOTE — TELEPHONE ENCOUNTER
LOV and thyroid US 2/2024 with 1 year fu planned. Dr. Giraldo ran labs and her TSH, Thyroxine free are abnormal. There is still a pending antibody. Please advise.

## 2024-08-22 DIAGNOSIS — E05.90 HYPERTHYROIDISM: Primary | ICD-10-CM

## 2024-08-22 LAB — THYROGLOB AB SERPL-ACNC: <0.9 IU/ML (ref 0–4)

## 2024-09-05 ENCOUNTER — HOSPITAL ENCOUNTER (OUTPATIENT)
Dept: CARDIOLOGY | Facility: HOSPITAL | Age: 64
Discharge: HOME | End: 2024-09-05
Payer: MEDICARE

## 2024-09-05 DIAGNOSIS — R79.89 ELEVATED BRAIN NATRIURETIC PEPTIDE (BNP) LEVEL: ICD-10-CM

## 2024-09-05 DIAGNOSIS — R60.0 LOCALIZED EDEMA: ICD-10-CM

## 2024-09-05 DIAGNOSIS — R00.2 PALPITATIONS: ICD-10-CM

## 2024-09-05 DIAGNOSIS — R06.02 SHORTNESS OF BREATH: ICD-10-CM

## 2024-09-05 PROCEDURE — 93306 TTE W/DOPPLER COMPLETE: CPT | Performed by: INTERNAL MEDICINE

## 2024-09-05 PROCEDURE — 93306 TTE W/DOPPLER COMPLETE: CPT

## 2024-09-06 LAB
AORTIC VALVE PEAK VELOCITY: 1.54 M/S
AV PEAK GRADIENT: 9.5 MMHG
AVA (PEAK VEL): 2.38 CM2
EJECTION FRACTION APICAL 4 CHAMBER: 61
EJECTION FRACTION: 63 %
LEFT ATRIUM VOLUME AREA LENGTH INDEX BSA: 28.9 ML/M2
LEFT VENTRICLE INTERNAL DIMENSION DIASTOLE: 4.82 CM (ref 3.5–6)
LEFT VENTRICULAR OUTFLOW TRACT DIAMETER: 1.9 CM
MITRAL VALVE E/A RATIO: 1.14
RIGHT VENTRICLE FREE WALL PEAK S': 21.8 CM/S
RIGHT VENTRICLE PEAK SYSTOLIC PRESSURE: 40 MMHG
TRICUSPID ANNULAR PLANE SYSTOLIC EXCURSION: 2.5 CM

## 2024-10-04 ENCOUNTER — LAB (OUTPATIENT)
Dept: LAB | Facility: LAB | Age: 64
End: 2024-10-04
Payer: MEDICARE

## 2024-10-04 DIAGNOSIS — E05.90 HYPERTHYROIDISM: Primary | ICD-10-CM

## 2024-10-04 DIAGNOSIS — E05.90 HYPERTHYROIDISM: ICD-10-CM

## 2024-10-04 DIAGNOSIS — R00.2 PALPITATION: Primary | ICD-10-CM

## 2024-10-04 LAB
T4 FREE SERPL-MCNC: 2.15 NG/DL (ref 0.61–1.12)
TSH SERPL-ACNC: <0.01 MIU/L (ref 0.44–3.98)

## 2024-10-04 PROCEDURE — 36415 COLL VENOUS BLD VENIPUNCTURE: CPT

## 2024-10-04 RX ORDER — METOPROLOL SUCCINATE 25 MG/1
25 TABLET, EXTENDED RELEASE ORAL DAILY
Qty: 30 TABLET | Refills: 0 | Status: SHIPPED | OUTPATIENT
Start: 2024-10-04 | End: 2025-04-02

## 2024-11-13 NOTE — PROGRESS NOTES
"Patient ID: Kelli Brito \"Cecelia\" is a 64 y.o. female.  Primary Care Provider: Jaxon Giraldo MD    Subjective    HPI:Gyn/Oncology consultation regarding newly diagnosed FIGO grade 1 endometrial cancer. Patient presented with a recent history of PMB to her primary care provider and subsequently underwent a TVUS demonstrating a uterine  mass. She had a D&C with Dr. Live with hysterectomy demonstrating FIGO grade 1 endometrial cancer.     ============================================  Medical History:  - BMI 37  - HLD  - Anxiety disorder  - L4 and L5 degenerative disc disease  - Obstructive sleep apnea with nocturnal CPAP use     Surgical History:  - Nasal surgery ()  - D&C     Obstetric/Gynecologic History: , 2 full term spontaneous vaginal deliveries  - Menarche age 12  - Menopausal since  with no prior episodes of PMB   - Last Pap: 2021 normal, denies history of prior abnormal pap smear     Family History:   - Reports maternal grandmother with ovarian cancer. Otherwise denies family history of breast/colon/uterine cancers. Father  secondary to malignancy in the setting of scleroderma.      Social History: Presents today with her , Gilles.   - Tobacco: Denies  - Alcohol: Denies  - Illicit substances, narcotics: Denies     Health Maintenance:   - Last mammogram: 2021  - Last colonoscopy/CRC screening: Cologua2021 normal, no screening colonoscopy     Code Status: Full      Objective    Visit Vitals  BP (!) 166/92 (BP Location: Right arm, Patient Position: Sitting, BP Cuff Size: Adult)   Pulse 82   Resp 18         Interval history:  Patient is a 64 year old female presents today for surveillance examination in the setting of stage IB grade 1 endometrioid carcinoma s/p hysterectomy and s/p whole pelvic radiation completed 2022. Mammogram is scheduled.  Getting left hip replacement in January.   Also diagnosed with thyroid nodule, TSH low and thyroxine is " high, seeing endocrinology next month.  She states she has been temperature intolerant, sweating, skin itching.  Patient denies any vaginal bleeding, pink tinged discharge. Patient denies any constipation or diarrhea, takes metamucil.  Appetite has been good.  Energy levels are baseline.  Patient denies hematuria.  Patient denies urinary leakage or incontinence. Patient is not currently sexually active.       Physical Exam:    Constitutional: Doing well. ARIAN  Eyes: PERRL  ENMT: Moist mucus membranes  Head/Neck: Supple. Symmetrical  Cardiovascular: Regular, rate and rhythm. 2+ equal pulses of the extremities  Respiratory/Thorax: CTA. RRR. Chest rise symmetrical.  Gastrointestinal: Non-distended, soft, non-tender  Genitourinary:   Normal external female genitalia. No vulvar lesions noted  Speculum exam: Smooth vaginal walls without lesions or masses. Vaginal cuff visualized without lesions, radiation changes noted.   Bimanual exam: Smooth vaginal wall without lesions or masses.  Surgically absent uterus, cervix, and adnexa.    Rectovaginal exam: smooth rectovaginal septum without lesions or masses  Musculoskeletal: ROM intact, no joint swelling, normal strength  Extremities: No edema  Neurological: Alert and oriented x 3. Pleasant and cooperative.  Lymphatic: No lymphadenopathy. No lymphedema  Psychological: Appropriate mood and behavior  Skin: Bilateral groin erythematous rash noted    A complete review of systems was performed and all systems were normal except what is noted in the interval history.          Assessment/Plan  Patient is a 64 year old female presents today for surveillance examination in the setting of stage IB grade 1 endometrioid carcinoma s/p hysterectomy and s/p whole pelvic radiation completed 11/2022.  ARIAN 2 years.     PLAN: F/U in 6 months or as needed  Physical examination was within normal limits today.  She is currently ARIAN.  We reviewed signs and symptoms of possible recurrence with the  patient and she will call our office should she experience any of these.

## 2024-11-14 ENCOUNTER — APPOINTMENT (OUTPATIENT)
Dept: GYNECOLOGIC ONCOLOGY | Facility: CLINIC | Age: 64
End: 2024-11-14
Payer: MEDICARE

## 2024-11-14 VITALS
WEIGHT: 240.2 LBS | RESPIRATION RATE: 18 BRPM | BODY MASS INDEX: 38.6 KG/M2 | HEIGHT: 66 IN | OXYGEN SATURATION: 96 % | DIASTOLIC BLOOD PRESSURE: 92 MMHG | HEART RATE: 82 BPM | SYSTOLIC BLOOD PRESSURE: 166 MMHG

## 2024-11-14 DIAGNOSIS — C54.1 ENDOMETRIAL CANCER (MULTI): Primary | ICD-10-CM

## 2024-11-14 PROCEDURE — 99213 OFFICE O/P EST LOW 20 MIN: CPT | Performed by: NURSE PRACTITIONER

## 2024-11-14 PROCEDURE — 3008F BODY MASS INDEX DOCD: CPT | Performed by: NURSE PRACTITIONER

## 2024-11-14 PROCEDURE — 3080F DIAST BP >= 90 MM HG: CPT | Performed by: NURSE PRACTITIONER

## 2024-11-14 PROCEDURE — 3077F SYST BP >= 140 MM HG: CPT | Performed by: NURSE PRACTITIONER

## 2024-11-14 PROCEDURE — 1125F AMNT PAIN NOTED PAIN PRSNT: CPT | Performed by: NURSE PRACTITIONER

## 2024-11-14 ASSESSMENT — PAIN SCALES - GENERAL: PAINLEVEL_OUTOF10: 6

## 2024-11-15 NOTE — PROGRESS NOTES
HPI   63 yo presents as new pt for thyroid evaluation.  Pt with hx of PMR, anxiety, dyslipdimia, hx of endometrial cancer (surgery/radn) and lung nodules.    Thyroid:  10/24: tsh <0.01, ft4-2.5  8/24: tsh <0.01, ft4-1.73, tgab/tpo ab's negative  2023: tsh-wnl  -no biotin supplement    Thyroid ultrasound 1/2024:  R 0.4cm TR5 (similar 2022)  R 1.5cm nodule tr4 (smaller that 2022)-non diagnostic FNA X 2  L 1.6cm nodule, tr3 (slightly larger than 2022)    Symptoms:  -pt was having palpitations, sweats, feel warm, brain fog, lower energy  -no obstructive sx  -eye feel gritty since recent 2024 eye surgery    -planning L hip replacement jan 2025    Pmh/psh:  -as above  -hip replacement  -eye surgery    SH:  -neg tobacco, neg alcohol    FH:  Mom-htn  Dad-htn, tobacco use, lung cancer  -no FH of thyroid disease    Current Outpatient Medications:     atorvastatin (Lipitor) 20 mg tablet, TAKE 1 TABLET BY MOUTH EVERY DAY, Disp: 90 tablet, Rfl: 2      Allergies as of 11/18/2024 - Reviewed 11/18/2024   Allergen Reaction Noted    Azithromycin Nausea Only and Unknown 10/19/2023    Biotene dry mouth Unknown 10/19/2023    Gabapentin Other and Unknown 10/19/2023    Grass pollen Itching 11/18/2024    Loratadine Unknown 10/19/2023    Mold Other 11/18/2024    Saliva stimulant comb. no.3 Unknown 10/19/2023         Review of Systems   Cardiology: Lightheadedness-denies.  Chest pain-denies.  Leg edema-in ankles.  Palpitations +  Respiratory: Cough-denies. Shortness of breath-denies.  Wheezing-denies.  Gastroenterology: Constipation + chronic.  Diarrhea-denies.  Heartburn-denies.  Endocrinology: Cold intolerance-denies.  Heat intolerance +.  Sweats +  Neurology: Headache +  Tremor-occ in hands.  Neuropathy in extremities-denies.  Psychology: Low energy + Irritability-denies.  Sleep disturbances +      BP (!) 166/92 (BP Location: Left arm, Patient Position: Sitting)   Pulse 89   Wt 108 kg (238 lb 3.2 oz)   BMI 38.45 kg/m²       Labs:  Lab  "Results   Component Value Date    WBC 4.1 (L) 08/20/2024    NRBC 0.0 08/20/2024    RBC 4.21 08/20/2024    HGB 12.4 08/20/2024    HCT 38.4 08/20/2024     08/20/2024     Lab Results   Component Value Date    CALCIUM 10.0 08/20/2024    AST 34 08/20/2024    ALKPHOS 85 08/20/2024    BILITOT 0.8 08/20/2024    PROT 6.9 08/20/2024    ALBUMIN 4.1 08/20/2024    GLOB 3.1 10/12/2022    AGR 1.4 (L) 10/12/2022     08/20/2024    K 4.3 08/20/2024     08/20/2024    CO2 29 08/20/2024    ANIONGAP 9 (L) 08/20/2024    BUN 18 08/20/2024    CREATININE 0.64 08/20/2024    UREACREAUR 22.5 (H) 09/26/2023    GLUCOSE 99 08/20/2024    ALT 33 08/20/2024    EGFR >90 08/20/2024     Lab Results   Component Value Date    CHOL 113 08/20/2024    TRIG 114 08/20/2024    HDL 40.1 08/20/2024    LDLCALC 50 08/20/2024     No results found for: \"MICROALBCREA\"  Lab Results   Component Value Date    TSH <0.01 (L) 10/04/2024     Lab Results   Component Value Date    AUCMWXJM65 349 04/10/2018     Lab Results   Component Value Date    HGBA1C 5.5 07/25/2023         Physical Exam   General Appearance: pleasant, cooperative, no acute distress  HEENT: no chemosis, no proptosis, no lid lag, no lid retraction  Neck: no lymphadenopathy, no thyromegaly, no dominant thyroid nodules  Heart: no murmurs, regular rate and rhythm, S1 and S2  Lungs: no wheezes, no rhonci, no rales  Extremities: no lower extremity swelling      Assessment/Plan   1. Hyperthyroidism  -suspect graves' disease, possibly a thyroiditis as well, less likely toxic nodular thyroid as levels were normal last year  -check tsh/ft4/ft3/tsi/tsh rec ab  -would likely use methimazole if graves', reviewed risk/benefits/warnings  -need to level out thyroid prior to jan 2025 hip surgery    2. Nodular thyroid disease (Primary)  -reviewed notes/films  -pt following with serial thyroid ultrasounds      Follow Up:  Arpit 4 months    -labs/tests/notes/scans reviewed  -reviewed and counseled patient " on medication monitoring and side effects

## 2024-11-16 DIAGNOSIS — E78.2 MIXED HYPERLIPIDEMIA: ICD-10-CM

## 2024-11-17 RX ORDER — ATORVASTATIN CALCIUM 20 MG/1
TABLET, FILM COATED ORAL
Qty: 90 TABLET | Refills: 2 | Status: SHIPPED | OUTPATIENT
Start: 2024-11-17

## 2024-11-18 ENCOUNTER — APPOINTMENT (OUTPATIENT)
Dept: ENDOCRINOLOGY | Facility: CLINIC | Age: 64
End: 2024-11-18
Payer: MEDICARE

## 2024-11-18 ENCOUNTER — LAB (OUTPATIENT)
Dept: LAB | Facility: LAB | Age: 64
End: 2024-11-18
Payer: MEDICARE

## 2024-11-18 VITALS
BODY MASS INDEX: 38.45 KG/M2 | WEIGHT: 238.2 LBS | HEART RATE: 89 BPM | DIASTOLIC BLOOD PRESSURE: 92 MMHG | SYSTOLIC BLOOD PRESSURE: 166 MMHG

## 2024-11-18 DIAGNOSIS — E04.1 NODULAR THYROID DISEASE: Primary | ICD-10-CM

## 2024-11-18 DIAGNOSIS — E05.90 HYPERTHYROIDISM: ICD-10-CM

## 2024-11-18 LAB
T3FREE SERPL-MCNC: 9.2 PG/ML (ref 2.3–4.2)
T4 FREE SERPL-MCNC: 2.17 NG/DL (ref 0.78–1.48)
TSH SERPL-ACNC: <0.01 MIU/L (ref 0.44–3.98)

## 2024-11-18 PROCEDURE — 99204 OFFICE O/P NEW MOD 45 MIN: CPT | Performed by: INTERNAL MEDICINE

## 2024-11-18 PROCEDURE — 1125F AMNT PAIN NOTED PAIN PRSNT: CPT | Performed by: INTERNAL MEDICINE

## 2024-11-18 PROCEDURE — 3077F SYST BP >= 140 MM HG: CPT | Performed by: INTERNAL MEDICINE

## 2024-11-18 PROCEDURE — 1036F TOBACCO NON-USER: CPT | Performed by: INTERNAL MEDICINE

## 2024-11-18 PROCEDURE — 3080F DIAST BP >= 90 MM HG: CPT | Performed by: INTERNAL MEDICINE

## 2024-11-18 PROCEDURE — 84443 ASSAY THYROID STIM HORMONE: CPT

## 2024-11-18 PROCEDURE — 84445 ASSAY OF TSI GLOBULIN: CPT | Performed by: INTERNAL MEDICINE

## 2024-11-18 PROCEDURE — 1159F MED LIST DOCD IN RCRD: CPT | Performed by: INTERNAL MEDICINE

## 2024-11-18 PROCEDURE — 83519 RIA NONANTIBODY: CPT

## 2024-11-18 PROCEDURE — 84439 ASSAY OF FREE THYROXINE: CPT

## 2024-11-18 PROCEDURE — 84481 FREE ASSAY (FT-3): CPT

## 2024-11-18 PROCEDURE — 36415 COLL VENOUS BLD VENIPUNCTURE: CPT

## 2024-11-18 ASSESSMENT — ENCOUNTER SYMPTOMS
LOSS OF SENSATION IN FEET: 0
OCCASIONAL FEELINGS OF UNSTEADINESS: 1
DEPRESSION: 0

## 2024-11-18 ASSESSMENT — PAIN SCALES - GENERAL: PAINLEVEL_OUTOF10: 4

## 2024-11-20 DIAGNOSIS — E05.90 HYPERTHYROIDISM: Primary | ICD-10-CM

## 2024-11-20 LAB — TSH RECEP AB SER-ACNC: 19.7 IU/L

## 2024-11-20 RX ORDER — METHIMAZOLE 10 MG/1
TABLET ORAL
Qty: 60 TABLET | Refills: 4 | Status: SHIPPED | OUTPATIENT
Start: 2024-11-20

## 2024-11-22 ENCOUNTER — APPOINTMENT (OUTPATIENT)
Dept: RADIOLOGY | Facility: HOSPITAL | Age: 64
End: 2024-11-22
Payer: MEDICARE

## 2024-11-23 LAB — SCAN RESULT: NORMAL

## 2024-11-25 ENCOUNTER — OFFICE VISIT (OUTPATIENT)
Dept: PRIMARY CARE | Facility: CLINIC | Age: 64
End: 2024-11-25
Payer: MEDICARE

## 2024-11-25 VITALS
HEIGHT: 66 IN | SYSTOLIC BLOOD PRESSURE: 132 MMHG | WEIGHT: 236 LBS | OXYGEN SATURATION: 98 % | DIASTOLIC BLOOD PRESSURE: 82 MMHG | BODY MASS INDEX: 37.93 KG/M2 | HEART RATE: 94 BPM

## 2024-11-25 DIAGNOSIS — M19.90 ARTHRITIS: ICD-10-CM

## 2024-11-25 DIAGNOSIS — E66.01 OBESITY, MORBID (MULTI): ICD-10-CM

## 2024-11-25 DIAGNOSIS — G89.29 CHRONIC LEFT HIP PAIN: ICD-10-CM

## 2024-11-25 DIAGNOSIS — E05.00 GRAVES DISEASE: ICD-10-CM

## 2024-11-25 DIAGNOSIS — E04.1 THYROID NODULE: ICD-10-CM

## 2024-11-25 DIAGNOSIS — E78.2 MIXED HYPERLIPIDEMIA: ICD-10-CM

## 2024-11-25 DIAGNOSIS — M25.552 CHRONIC LEFT HIP PAIN: ICD-10-CM

## 2024-11-25 DIAGNOSIS — Z12.11 ENCOUNTER FOR SCREENING FOR MALIGNANT NEOPLASM OF COLON: ICD-10-CM

## 2024-11-25 DIAGNOSIS — C54.1 ENDOMETRIAL ADENOCARCINOMA (MULTI): ICD-10-CM

## 2024-11-25 DIAGNOSIS — M16.12 PRIMARY OSTEOARTHRITIS OF LEFT HIP: ICD-10-CM

## 2024-11-25 DIAGNOSIS — Z00.00 WELCOME TO MEDICARE PREVENTIVE VISIT: Primary | ICD-10-CM

## 2024-11-25 DIAGNOSIS — E55.9 VITAMIN D DEFICIENCY: ICD-10-CM

## 2024-11-25 DIAGNOSIS — Z12.31 SCREENING MAMMOGRAM FOR BREAST CANCER: ICD-10-CM

## 2024-11-25 PROCEDURE — 1159F MED LIST DOCD IN RCRD: CPT | Performed by: FAMILY MEDICINE

## 2024-11-25 PROCEDURE — 1036F TOBACCO NON-USER: CPT | Performed by: FAMILY MEDICINE

## 2024-11-25 PROCEDURE — G0402 INITIAL PREVENTIVE EXAM: HCPCS | Performed by: FAMILY MEDICINE

## 2024-11-25 PROCEDURE — 1123F ACP DISCUSS/DSCN MKR DOCD: CPT | Performed by: FAMILY MEDICINE

## 2024-11-25 PROCEDURE — 1158F ADVNC CARE PLAN TLK DOCD: CPT | Performed by: FAMILY MEDICINE

## 2024-11-25 PROCEDURE — 93010 ELECTROCARDIOGRAM REPORT: CPT | Performed by: FAMILY MEDICINE

## 2024-11-25 PROCEDURE — 1170F FXNL STATUS ASSESSED: CPT | Performed by: FAMILY MEDICINE

## 2024-11-25 PROCEDURE — 1160F RVW MEDS BY RX/DR IN RCRD: CPT | Performed by: FAMILY MEDICINE

## 2024-11-25 PROCEDURE — 3075F SYST BP GE 130 - 139MM HG: CPT | Performed by: FAMILY MEDICINE

## 2024-11-25 PROCEDURE — 93005 ELECTROCARDIOGRAM TRACING: CPT | Performed by: FAMILY MEDICINE

## 2024-11-25 PROCEDURE — 3008F BODY MASS INDEX DOCD: CPT | Performed by: FAMILY MEDICINE

## 2024-11-25 PROCEDURE — 1126F AMNT PAIN NOTED NONE PRSNT: CPT | Performed by: FAMILY MEDICINE

## 2024-11-25 PROCEDURE — 3079F DIAST BP 80-89 MM HG: CPT | Performed by: FAMILY MEDICINE

## 2024-11-25 ASSESSMENT — ACTIVITIES OF DAILY LIVING (ADL)
FEEDING: INDEPENDENT
TAKING MEDICATION: INDEPENDENT
USING TELEPHONE: INDEPENDENT
USING TRANSPORTATION: INDEPENDENT
DRESSING: INDEPENDENT
PREPARING MEALS: INDEPENDENT
GROCERY SHOPPING: INDEPENDENT
BATHING: INDEPENDENT
PILL BOX USED: NO
NEEDS ASSISTANCE WITH FOOD: INDEPENDENT
STIL DRIVING: YES
JUDGMENT_ADEQUATE_SAFELY_COMPLETE_DAILY_ACTIVITIES: YES
ADEQUATE_TO_COMPLETE_ADL: YES
EATING: INDEPENDENT
TOILETING: INDEPENDENT
DOING HOUSEWORK: INDEPENDENT
MANAGING FINANCES: INDEPENDENT

## 2024-11-25 ASSESSMENT — GERIATRIC MINI NUTRITIONAL ASSESSMENT (MNA)
C GENERAL MOBILITY: GOES OUT
D HAS SUFFERED PSYCHOLOGICAL STRESS OR ACUTE DISEASE IN THE PAST 3 MONTHS?: NO
E NEUROPSYCHOLOGICAL PROBLEMS: NO PSYCHOLOGICAL PROBLEMS
B WEIGHT LOSS DURING THE LAST 3 MONTHS: NO WEIGHT LOSS
A HAS FOOD INTAKE DECLINED OVER THE PAST 3 MONTHS DUE TO LOSS OF APPETITE, DIGESTIVE PROBLEMS, CHEWING OR SWALLOWING DIFFICULTIES?: NO DECREASE IN FOOD INTAKE

## 2024-11-25 ASSESSMENT — ENCOUNTER SYMPTOMS
ARTHRALGIAS: 1
LOSS OF SENSATION IN FEET: 0
SHORTNESS OF BREATH: 0
PALPITATIONS: 0
NERVOUS/ANXIOUS: 0
TREMORS: 0
OCCASIONAL FEELINGS OF UNSTEADINESS: 0
COUGH: 0
DIZZINESS: 0
WHEEZING: 0
FEVER: 0
HEMATURIA: 0
WEAKNESS: 0
VOMITING: 0
CHILLS: 0
FREQUENCY: 0
FATIGUE: 1
CONFUSION: 0
CONSTIPATION: 0
DEPRESSION: 0
MYALGIAS: 0
NAUSEA: 0

## 2024-11-25 ASSESSMENT — VISUAL ACUITY
OS_CC: 20/13
OD_CC: 20/13

## 2024-11-25 ASSESSMENT — PATIENT HEALTH QUESTIONNAIRE - PHQ9
2. FEELING DOWN, DEPRESSED OR HOPELESS: NOT AT ALL
SUM OF ALL RESPONSES TO PHQ9 QUESTIONS 1 AND 2: 0
1. LITTLE INTEREST OR PLEASURE IN DOING THINGS: NOT AT ALL

## 2024-11-25 ASSESSMENT — COGNITIVE AND FUNCTIONAL STATUS - GENERAL: VERBAL FLUENCY - ANIMAL NAMES (0 TO 25): 3

## 2024-11-25 ASSESSMENT — PAIN SCALES - GENERAL: PAINLEVEL_OUTOF10: 0-NO PAIN

## 2024-11-25 NOTE — PROGRESS NOTES
Subjective   Patient ID: Cecelia Brito is a 64 y.o. female who presents for Annual Exam.    Mini Cognitive Screening:          Three Word Recall             Words:  Book, apple, window.            Patient able to repeat?  Yes .         Three Word Recall after 5mins             Word recall Score (0-3):  3 .         Clock Drawing             Given preprinted [place] and instructions?  Yes .            Clock Draw Score (0 or 2):  2 .         Clock Draw plus Word Recall Score             Mini Cog Result <3 Pos:  5 .         Follow-up:             .  Not needed, negative result .     General Comments:          Patient here for Medicare Wellness Exam.         Active medical problems:         PMH/PSH/FMH/SHX: reviewed, noted below.         Other providers:         Medications: reviewed, noted below.         Depression screening: denies feeling down, depressed, hopeless. Denies having felt little interest or pleasure in doing things.         Functional ability and safety: Get up and go test is <30s. Pt denies any issues with ADLs, including phone, transportation, shopping, preparing meals, housework,laundry, medications or managing money. No home safety concerns, including having rugs in the hallway, lack grab bars in the bathroom, lack handrails, on the stairs or have poor lighting. Pt denies falls.         Hearing changes: Pt denies changes or concerns.         Advanced directives: discussed and recommended.          Preventative services: sheet reviewed, scanned, copy provided to patient.   No opioid use  Has a good feeling of their overall wellbeing   Cologuard 8/23 - negative, s/p hysterectomy, mammogram this week  Having a THR on the left 1/10/25, she denies any chest pain, no SOB, no CAD, no history of DVT/PE, no bleeding disorders, denies any complications to anesthesia or surgery     Thyroid Nodule:          Thyroid Nodule new onset 2 cm thyroid nodule - seen first on a CT of her chest - no symptoms of thyroid  disease, she denies any pressure in her nek no troubles swallowing. Occasional palpitations. She was diagnosed with Graves disease with Dr Munson - stable on meds, sees Dr Rees for her thyroid nodules     Hyperlipidemia:          Patient here for F/U. tolerating medicine well, stable.          Labs ordered today.      Hip/Thigh:          Hip pain, left, lateral hip.          Thigh pain, left lateral.          Pain lateral side of the hip, worse with activities.          Weakness none.          Radiation of pain left leg.          Tingling/numbness none.          she has tried PT with no real help - she has not gone recently - agrees to work on stretches - she is going for a hip replacement- she denies any chest pain, no shortness of breath, no personal or family history of PE/DVT, non smoker, no compliations to surgery or anesthesia.     GYN:          she had a THBSO due to endometrial cancer - she had 25 days of pelvic radiation, no chemo needed. SHe is feeling stable.     -Pulmonology:          Pulmonary nodule/mass she has a known history of pulomanry nodules and is due for a follow up CT chest.      Anxiety:          Anxiety F/U stable, at patient's baseline. No meds         Supports significant, support from spouse, significant, support from family.          Energy change decreased energy.          Concentration decreased.          Suicidal ideations none.      Polymyalgia Rheumatica:          she was diagnosed with PMR a little over a year ago - she is off of prednisone, feels good most days - she is trying to avoid flour and sugar - she is using the Bowersox regimen (she gets it online)      She had a recent ECHO 8/24           Review of Systems   Constitutional:  Positive for fatigue. Negative for chills and fever.   HENT:  Negative for ear pain and postnasal drip.    Respiratory:  Negative for cough, shortness of breath and wheezing.    Cardiovascular:  Negative for chest pain, palpitations and leg  "swelling.   Gastrointestinal:  Negative for constipation, nausea and vomiting.   Genitourinary:  Negative for frequency and hematuria.   Musculoskeletal:  Positive for arthralgias. Negative for myalgias.   Skin:  Negative for rash.   Neurological:  Negative for dizziness, tremors and weakness.   Psychiatric/Behavioral:  Negative for confusion. The patient is not nervous/anxious.        Objective   /82   Pulse 94   Ht 1.676 m (5' 6\")   Wt 107 kg (236 lb)   SpO2 98%   BMI 38.09 kg/m²     Physical Exam  Exam conducted with a chaperone present.   Constitutional:       General: She is not in acute distress.     Appearance: Normal appearance. She is not ill-appearing.   HENT:      Head: Normocephalic.      Right Ear: Tympanic membrane and external ear normal.      Left Ear: Tympanic membrane and external ear normal.      Nose: Nose normal. No congestion.      Mouth/Throat:      Mouth: Mucous membranes are moist.      Pharynx: No posterior oropharyngeal erythema.   Eyes:      Extraocular Movements: Extraocular movements intact.      Conjunctiva/sclera: Conjunctivae normal.      Pupils: Pupils are equal, round, and reactive to light.   Cardiovascular:      Rate and Rhythm: Normal rate and regular rhythm.      Pulses: Normal pulses.      Heart sounds: Normal heart sounds. No murmur heard.  Pulmonary:      Effort: Pulmonary effort is normal. No respiratory distress.      Breath sounds: Normal breath sounds. No wheezing.   Chest:   Breasts:     Right: Normal. No mass, nipple discharge, skin change or tenderness.      Left: Normal. No mass, nipple discharge, skin change or tenderness.   Abdominal:      General: Bowel sounds are normal.      Palpations: Abdomen is soft. There is no mass.      Tenderness: There is no abdominal tenderness.      Hernia: No hernia is present.   Genitourinary:     Comments: S/p TAHBSO  Musculoskeletal:         General: Normal range of motion.      Cervical back: Neck supple. No tenderness. "      Right lower leg: No edema.      Left lower leg: No edema.      Comments: Left hip pain - uses a cane to help ambulate due to hip pain   Lymphadenopathy:      Cervical: No cervical adenopathy.   Skin:     General: Skin is warm.      Findings: No rash.   Neurological:      General: No focal deficit present.      Mental Status: She is alert and oriented to person, place, and time.      Gait: Gait abnormal.   Psychiatric:         Mood and Affect: Mood normal.         Behavior: Behavior normal.         Assessment/Plan   Problem List Items Addressed This Visit             ICD-10-CM    Endometrial adenocarcinoma (Multi) C54.1    Hyperlipidemia E78.5    Obesity, morbid (Multi) E66.01    Arthritis M19.90    Thyroid nodule E04.1    Vitamin D deficiency E55.9    Chronic left hip pain M25.552, G89.29    Primary osteoarthritis of left hip M16.12     Other Visit Diagnoses         Codes    Welcome to Medicare preventive visit    -  Primary Z00.00    recommend Shingrix, prevnar 20 and influenza     Graves disease     E05.00    per Dr Munson     Encounter for screening for malignant neoplasm of colon     Z12.11    Screening mammogram for breast cancer     Z12.31    getting done friday     BMI 38.0-38.9,adult     Z68.38    diet, exercise and weight loss

## 2024-11-26 ENCOUNTER — OFFICE VISIT (OUTPATIENT)
Dept: ORTHOPEDIC SURGERY | Facility: CLINIC | Age: 64
End: 2024-11-26
Payer: MEDICARE

## 2024-11-26 ENCOUNTER — HOSPITAL ENCOUNTER (OUTPATIENT)
Dept: RADIOLOGY | Facility: CLINIC | Age: 64
Discharge: HOME | End: 2024-11-26
Payer: MEDICARE

## 2024-11-26 DIAGNOSIS — M16.12 PRIMARY LOCALIZED OSTEOARTHRITIS OF LEFT HIP: ICD-10-CM

## 2024-11-26 PROCEDURE — 99214 OFFICE O/P EST MOD 30 MIN: CPT | Performed by: STUDENT IN AN ORGANIZED HEALTH CARE EDUCATION/TRAINING PROGRAM

## 2024-11-26 PROCEDURE — 1159F MED LIST DOCD IN RCRD: CPT | Performed by: STUDENT IN AN ORGANIZED HEALTH CARE EDUCATION/TRAINING PROGRAM

## 2024-11-26 PROCEDURE — 1123F ACP DISCUSS/DSCN MKR DOCD: CPT | Performed by: STUDENT IN AN ORGANIZED HEALTH CARE EDUCATION/TRAINING PROGRAM

## 2024-11-26 PROCEDURE — 73502 X-RAY EXAM HIP UNI 2-3 VIEWS: CPT | Mod: LEFT SIDE | Performed by: RADIOLOGY

## 2024-11-26 PROCEDURE — 73502 X-RAY EXAM HIP UNI 2-3 VIEWS: CPT | Mod: LT

## 2024-11-26 PROCEDURE — 1036F TOBACCO NON-USER: CPT | Performed by: STUDENT IN AN ORGANIZED HEALTH CARE EDUCATION/TRAINING PROGRAM

## 2024-11-26 NOTE — PROGRESS NOTES
PRIMARY CARE PHYSICIAN: Jaxon Giraldo MD  REFERRING PROVIDER: No referring provider defined for this encounter.       SUBJECTIVE  CHIEF COMPLAINT:   Chief Complaint   Patient presents with    Left Hip - Pre-op Visit        HPI: Kelli Brito is a pleasant 64 y.o. year-old female who is seen today for preoperative consultation prior to upcoming left total hip arthroplasty.  The patient continues to complain of disabling pain.  She is looking forward to her left total hip replacement.  Pain is located in the groin, anterior thigh and lateral thigh.  Pain is exacerbated by activity.  She is using an assistive device.  Her quality life is negatively impacted.    She denies any issues with her right hip replacement.  No dislocation or subluxation.  No history of wound complications.  She does have difficulty laying on that side.    FUNCTIONAL STATUS: occasionally limited.  AMBULATORY STATUS: Independent community ambulation with canes/crutches  PREVIOUS TREATMENTS: activity modification, over the counter medications, and corticosteroid injections.  The injection provided her with excellent relief.  HISTORY OF SURGERY ON AFFECTED HIP(S): No   BACK PAIN REPORTED: Yes   SYMPTOMS INTERFERING WITH SLEEP: Yes   INSTABILITY: No   IMPACTING QUALITY OF LIFE: Yes     REVIEW OF SYSTEMS  There has been no interval change in this patient's past medical, surgical, medications, allergies, family history or social history since the most recent visit to a provider within our department.  14 point review of systems was performed, reviewed, and negative except for pertinent positives documented in the history of present illness.    Past Medical History:   Diagnosis Date    Anxiety disorder, unspecified 10/27/2015    Anxiety    Other seasonal allergic rhinitis 01/20/2015    Seasonal allergies    Personal history of malignant neoplasm, unspecified     History of malignant neoplasm    Personal history of other diseases of the  circulatory system     History of hypertension    Personal history of other diseases of the digestive system     History of gastroesophageal reflux (GERD)    Personal history of other diseases of the musculoskeletal system and connective tissue     History of polymyalgia rheumatica    Personal history of other diseases of the nervous system and sense organs     History of sleep apnea    Personal history of urinary calculi     History of renal calculi    Sleep apnea     Uterine cancer (Multi)         Allergies   Allergen Reactions    Azithromycin Nausea Only and Unknown    Biotene Dry Mouth Unknown    Gabapentin Other and Unknown    Grass Pollen Itching    Loratadine Unknown    Mold Other    Saliva Stimulant Comb. No.3 Unknown        Past Surgical History:   Procedure Laterality Date    HYSTERECTOMY      OTHER SURGICAL HISTORY  11/29/2022    Hysterectomy    OTHER SURGICAL HISTORY  11/29/2022    Nose surgery    OTHER SURGICAL HISTORY  11/29/2022    Dilation and curettage    US GUIDED FINE PERCUTANEOUS ASPIRATION  12/08/2022    US GUIDED FINE PERCUTANEOUS ASPIRATION [facility]        No family history on file.     Social History     Socioeconomic History    Marital status:      Spouse name: Not on file    Number of children: Not on file    Years of education: Not on file    Highest education level: Not on file   Occupational History    Not on file   Tobacco Use    Smoking status: Never    Smokeless tobacco: Never   Substance and Sexual Activity    Alcohol use: Never    Drug use: Never    Sexual activity: Not on file   Other Topics Concern    Not on file   Social History Narrative    Not on file     Social Drivers of Health     Financial Resource Strain: Not on file   Food Insecurity: Not on file   Transportation Needs: Not on file   Physical Activity: Not on file   Stress: Not on file   Social Connections: Not on file   Intimate Partner Violence: Not on file   Housing Stability: Not on file         CURRENT MEDICATIONS:   Current Outpatient Medications   Medication Sig Dispense Refill    atorvastatin (Lipitor) 20 mg tablet TAKE 1 TABLET BY MOUTH EVERY DAY 90 tablet 2    methIMAzole (Tapazole) 10 mg tablet Take 2 pills daily for 2 weeks, then stay on 1 pill daily until next visit 60 tablet 4     No current facility-administered medications for this visit.        OBJECTIVE    PHYSICAL EXAM  There is no height or weight on file to calculate BMI.    General: Well-appearing female in no acute distress.  Awake, alert and oriented.  Pleasant and cooperative.  Respiratory: Non-labored breathing  Mood: Euthymic   Gait: Antalgic   Assistive Device: cane    Limb Length Discrepancy: 3 mm    Affected Left Hip  Range of motion:   Flexion: 75  Extension: 0  Internal Rotation: 5  External Rotation: 15  Abduction: 20  Hip Flexor Strength: 5/5  Abductor Strength: 5/5  Adductor Strength: 5/5  Tenderness: Focal tenderness palpation over the greater trochanter  Sensation: Intact to light touch distally  Motor function: Able to fire TA, EHL, G/S  Pulses: Palpable DP pulse  +C sign  +stinchfield    IMAGING:  AP pelvis, AP hip and false profile views: Independent review of left hip and pelvis x-rays was performed. The findings were reviewed with the patient. There are moderate degenerative changes of the left hip with associated joint space narrowing and subchondral sclerosis. No evidence of fracture, AVN, dislocation, osteomyelitis.    AP / lateral/ mid-flexion/sunrise views: Independent review of left knee x-rays was performed. The findings were reviewed with the patient. There are mild degenerative changes of the left knee with neutral  limb alignment. There is mild joint space narrowing and subchondral sclerosis. No evidence of fracture, AVN, dislocation, osteomyelitis.      ASSESSMENT & PLAN    IMPRESSION:  Kelli Brito is a 64 y.o. female with a a history of right HANSEL who presents for preoperative consultation prior to  upcoming left total hip arthroplasty.  The patient continues to complain of disabling pain.  I think she is a strong candidate for total hip replacement.    PLAN:  Kelli Brito for more than six months has had limited function as well as persistent and severe pain which has negatively impacted the quality of life and interfered with activities of daily living. Under my care or the care of other providers, for greater than the three months, conservative treatment including activity modification, over the counter pain medications, physical therapy and/or recommended home exercise program, have provided only minimal relief. The option to continue with conservative measures in lieu of arthroplasty was discussed and offered. However, given the failure of these conservative measures and the clinical and radiographic evidence of end-stage arthritis, the patient is a good candidate for an elective total hip arthroplasty. The stated potential benefits include pain relief, a feeling of improved joint motion and improved function were discussed but no guarantees were offered.    I talked with the patient at length about risks, limitations, benefits and alternatives to total hip replacement today. I reviewed risks and concerns including but not limited to implant wear, loosening, implant failure, infection, need for revision surgery, delayed wound healing, deep vein thrombosis, pulmonary embolism, stroke, other cardiopulmonary event, nerve or vascular injury, death and other medical and anesthetic complications of surgery. We talked about the potential for persistent pain following surgery since there are many possible causes for hip and leg pain. The patient was advised that hip replacement will only relieve pain that is coming from the hip. We talked about leg length discrepancy that may necessitate the use of a shoe lift, neurovascular problems such as foot drop and dislocation after surgery. The patient understands  that we may have to lengthen the leg slightly to provide for adequate stability of the hip. I reviewed dislocation precautions and activity restrictions in detail. We discussed the concerns about intraoperative fracture, ingrowth failure, thigh pain and possible post-operative weight bearing restrictions following cementless hip replacement.  We discussed the possible need for a blood transfusion. We discussed the fact that many of our patients are able to go home in 1 day or the same day depending on their health, mobility, pre-op preparation, individual home situation and personal preference. The patient should take our pre-operative teaching class. All of the patients questions were answered. The patient can call my office to schedule surgery and the pre-op teaching class. I told the patient that they should contact their primary care physician to discuss fitness for surgery. The patient was also encouraged to get dental clearance prior to surgery.     The patient acknowledged a clear understanding of these issues and expressed the desire to proceed with surgery once medical clearance has been obtained.    The patient has identified their personal goals of their joint replacement surgery and recovery and we have discussed them. In addition, we have discussed the advantages and disadvantages of various implant and fixation options, as well as various surgical approaches. The basic concepts of the joint replacement procedure has been reviewed with the patient and the patient has been provided the opportunity to see an actual implant either in the office or in our pre-op education class.    I also discussed with the patient the risks of being in the hospital environment in the setting of COVID-19. This risk was considered in light of the overall risk and benefit discussion related to the surgery. The patient's symptoms are severe and worsening, and cause an inability to perform activities of daily living. All  possible precautions will be taken and length of stay will be limited as much as possible. The patient is fully aware of this after complete discussion and would like to proceed.    The patient has the following comorbidities that increase the risk of infection following joint replacement surgery: obesity, SHITAL. This was explicitly discussed with the patient and they would like to proceed with surgery.     Surgical plan: Left total hip arthroplasty   Implants: Depuy Emphasys and Actis   Special equipment: Possible Preveena   DVT prophylaxis:  Aspirin 81 mg BID for 4 weeks   Drugs to stop: none  Allergies to antibiotics: none  Antibiotic Plan: Ancef (+/- vancomycin pending MRSA screen)  Special clearance needed: Per PAT   Candidate for Outpatient: No  Meds-to-beds: Not d/w patient   Pain medication post op: Standard: Oxycodone, Tramadol and Tylenol   DME Recommendations: Hip Kit, Raised Toilet Seat if toilet seats at home are low,  Walker or Crutches depending on patient´s preference, Thigh high compression stocking. OPTIONAL: [employer]     * This office note was dictated using Dragon voice to text software and was not proofread for spelling or grammatical errors *      *This note was created using voice recognition software and was not corrected for typographical or grammatical errors.*

## 2024-12-03 ENCOUNTER — HOSPITAL ENCOUNTER (OUTPATIENT)
Dept: RADIOLOGY | Facility: CLINIC | Age: 64
Discharge: HOME | End: 2024-12-03
Payer: MEDICARE

## 2024-12-03 VITALS — HEIGHT: 66 IN | BODY MASS INDEX: 37.77 KG/M2 | WEIGHT: 235 LBS

## 2024-12-03 DIAGNOSIS — Z12.31 SCREENING MAMMOGRAM FOR BREAST CANCER: ICD-10-CM

## 2024-12-03 PROCEDURE — 77063 BREAST TOMOSYNTHESIS BI: CPT | Performed by: RADIOLOGY

## 2024-12-03 PROCEDURE — 77067 SCR MAMMO BI INCL CAD: CPT | Performed by: RADIOLOGY

## 2024-12-03 PROCEDURE — 77067 SCR MAMMO BI INCL CAD: CPT

## 2024-12-06 DIAGNOSIS — Z12.31 ENCOUNTER FOR SCREENING MAMMOGRAM FOR BREAST CANCER: Primary | ICD-10-CM

## 2024-12-18 ENCOUNTER — EDUCATION (OUTPATIENT)
Dept: ORTHOPEDIC SURGERY | Facility: HOSPITAL | Age: 64
End: 2024-12-18
Payer: MEDICARE

## 2024-12-18 ENCOUNTER — PRE-ADMISSION TESTING (OUTPATIENT)
Dept: PREADMISSION TESTING | Facility: HOSPITAL | Age: 64
End: 2024-12-18
Payer: MEDICARE

## 2024-12-18 VITALS
HEART RATE: 71 BPM | SYSTOLIC BLOOD PRESSURE: 158 MMHG | HEIGHT: 66 IN | BODY MASS INDEX: 37.73 KG/M2 | RESPIRATION RATE: 18 BRPM | DIASTOLIC BLOOD PRESSURE: 90 MMHG | WEIGHT: 234.79 LBS | TEMPERATURE: 97.9 F | OXYGEN SATURATION: 99 %

## 2024-12-18 DIAGNOSIS — R73.09 ELEVATED GLUCOSE: ICD-10-CM

## 2024-12-18 DIAGNOSIS — M16.12 PRIMARY OSTEOARTHRITIS OF LEFT HIP: ICD-10-CM

## 2024-12-18 DIAGNOSIS — Z01.818 PREOP TESTING: Primary | ICD-10-CM

## 2024-12-18 LAB
ALBUMIN SERPL BCP-MCNC: 4.1 G/DL (ref 3.4–5)
ALP SERPL-CCNC: 111 U/L (ref 33–136)
ALT SERPL W P-5'-P-CCNC: 16 U/L (ref 7–45)
ANION GAP SERPL CALC-SCNC: 11 MMOL/L (ref 10–20)
AST SERPL W P-5'-P-CCNC: 18 U/L (ref 9–39)
BILIRUB SERPL-MCNC: 0.5 MG/DL (ref 0–1.2)
BUN SERPL-MCNC: 21 MG/DL (ref 6–23)
CALCIUM SERPL-MCNC: 10.3 MG/DL (ref 8.6–10.3)
CHLORIDE SERPL-SCNC: 105 MMOL/L (ref 98–107)
CO2 SERPL-SCNC: 27 MMOL/L (ref 21–32)
CREAT SERPL-MCNC: 0.79 MG/DL (ref 0.5–1.05)
EGFRCR SERPLBLD CKD-EPI 2021: 83 ML/MIN/1.73M*2
ERYTHROCYTE [DISTWIDTH] IN BLOOD BY AUTOMATED COUNT: 13 % (ref 11.5–14.5)
EST. AVERAGE GLUCOSE BLD GHB EST-MCNC: 114 MG/DL
GLUCOSE SERPL-MCNC: 103 MG/DL (ref 74–99)
HBA1C MFR BLD: 5.6 %
HCT VFR BLD AUTO: 41.5 % (ref 36–46)
HGB BLD-MCNC: 13.7 G/DL (ref 12–16)
MCH RBC QN AUTO: 28.8 PG (ref 26–34)
MCHC RBC AUTO-ENTMCNC: 33 G/DL (ref 32–36)
MCV RBC AUTO: 87 FL (ref 80–100)
NRBC BLD-RTO: NORMAL /100{WBCS}
PLATELET # BLD AUTO: 269 X10*3/UL (ref 150–450)
POTASSIUM SERPL-SCNC: 4.3 MMOL/L (ref 3.5–5.3)
PROT SERPL-MCNC: 7.6 G/DL (ref 6.4–8.2)
RBC # BLD AUTO: 4.76 X10*6/UL (ref 4–5.2)
SODIUM SERPL-SCNC: 139 MMOL/L (ref 136–145)
WBC # BLD AUTO: 6.8 X10*3/UL (ref 4.4–11.3)

## 2024-12-18 PROCEDURE — 83036 HEMOGLOBIN GLYCOSYLATED A1C: CPT | Mod: BEALAB

## 2024-12-18 PROCEDURE — 36415 COLL VENOUS BLD VENIPUNCTURE: CPT

## 2024-12-18 PROCEDURE — 87081 CULTURE SCREEN ONLY: CPT | Mod: BEALAB

## 2024-12-18 PROCEDURE — 85027 COMPLETE CBC AUTOMATED: CPT

## 2024-12-18 PROCEDURE — 80053 COMPREHEN METABOLIC PANEL: CPT

## 2024-12-18 PROCEDURE — 99204 OFFICE O/P NEW MOD 45 MIN: CPT | Performed by: PHYSICIAN ASSISTANT

## 2024-12-18 RX ORDER — CHLORHEXIDINE GLUCONATE ORAL RINSE 1.2 MG/ML
SOLUTION DENTAL
Qty: 473 ML | Refills: 0 | Status: SHIPPED | OUTPATIENT
Start: 2024-12-18

## 2024-12-18 RX ORDER — CHLORHEXIDINE GLUCONATE 40 MG/ML
SOLUTION TOPICAL DAILY
Qty: 470 ML | Refills: 0 | Status: SHIPPED | OUTPATIENT
Start: 2024-12-18 | End: 2024-12-23

## 2024-12-18 RX ORDER — IBUPROFEN 200 MG
600 TABLET ORAL 2 TIMES DAILY
COMMUNITY

## 2024-12-18 RX ORDER — ASPIRIN 81 MG/1
81 TABLET ORAL DAILY
COMMUNITY

## 2024-12-18 ASSESSMENT — ENCOUNTER SYMPTOMS
CARDIOVASCULAR NEGATIVE: 1
GASTROINTESTINAL NEGATIVE: 1
EYES NEGATIVE: 1
ENDOCRINE NEGATIVE: 1
NEUROLOGICAL NEGATIVE: 1
NECK NEGATIVE: 1
CONSTITUTIONAL NEGATIVE: 1
RESPIRATORY NEGATIVE: 1

## 2024-12-18 ASSESSMENT — LIFESTYLE VARIABLES: SMOKING_STATUS: NONSMOKER

## 2024-12-18 ASSESSMENT — PAIN DESCRIPTION - DESCRIPTORS: DESCRIPTORS: ACHING;SHOOTING

## 2024-12-18 ASSESSMENT — PAIN SCALES - GENERAL: PAINLEVEL_OUTOF10: 4

## 2024-12-18 ASSESSMENT — PAIN - FUNCTIONAL ASSESSMENT: PAIN_FUNCTIONAL_ASSESSMENT: 0-10

## 2024-12-18 NOTE — GROUP NOTE
In addition to the group class activities, Cecelia Brito had the following lab work completed:  No orders of the defined types were placed in this encounter.      A new History and Physical was not completed.    This class lasted approximately 2 hours and had 4 participants. The nurse instructor covered the following topics:    MyChart Enrollment  Communication with Care Team  My Chart is the best form of communication to reach all of your caregivers  You can send messages to specific care givers, or a care team  Continued Education  You will be enrolled in a Joint Replacement care plan to receive additional education before and after surgery  You can review a short recording of the class content - [email or web address]  Access to Medical Records  You can access test results, office notes, appointments, etc.  You can connect to other healthcare systems who use [employer] ([facility], [facility], [facility], etc.)  [employer]  Program Information  Consent to Enroll    Background/Understanding of Joint Replacement Surgery  Potential for same day discharge  Any questions or concerns to be directed to the surgeon's office  Not all patients are appropriate for same day discharge    How to Prepare for Surgery  Use of Nicotine Products/Smoking  Stop several weeks before surgery  Such products slow down the healing process and increase risk of post-op infection and complications  Clearance for Surgery  Medical Clearance by Specialists  Dental Clearance  Cracked/Broken/Loose teeth left untreated may postpone surgery  The importance of post-op antibiotics for dental visits per surgeon protocol  Preadmission Testing  **Potential for postponed surgery if appropriate clearance is not obtained  Medication Instruction  Follow instructions provided by the doctor who prescribes your medication (typically, but not limited to cardiologist)  Preadmission testing will provide additional instructions during your appointment on  what to stop and what to take as you get closer to surgery  For clarification of these instructions, please call preadmission testing directly - [phone]  Tips for Preparing the home for discharge from the hospital  Care Partner  Requirement for surgery, the patient must have a plan to have help at home  Potential for postponed surgery if plan for home support cannot be established  Your Care Partner does not need medical training  How the care partner can help after surgery  CHG Body Wash/Mouth Wash  Follow the instructions given at preadmission testing  Body wash is to be used on the body and hair for 5 washes  Mouthwash is to be used the night before and morning of surgery  **This is a system-wide protocol developed by infectious disease professionals, we will not alter our recommendations for those with sensitive skin or those who have special hair needs.  Please follow the instructions as they are written as this will provide the best infection prevention measures for surgery.  Should you have an allergy to one of the products, please discuss with your preadmission team**    What to Expect in the Hospital/At Home  Morning of Surgery NPO Guidelines  Nothing to eat after midnight  Water can be consumed up to 2 hours prior to arrival  Surgical and Post-Surgical Care Team  Surgical Team  Anesthesia Team  Nursing  Physical Therapy  Care Coordinating  Pharmacy  Hospital Arrival Instructions  Arrive at the time provided to you  Consider traffic patterns (rush-hour) based on arrival time  Have arrangements made for a ride home  If discharging same day, care partner should remain at the hospital  Recovering after Surgery  Recovery Room - Visitors are not brought back  Transition to hospital room - 2nd Floor, Visitors will be directed to your room  The presence of and strategies for controlling surgical pain and swelling  The importance of early mobility  Side effects after surgery  What to expect if staying  overnight    Discharge Planning  The intended plan for discharge will be for patients to discharge home  All patients require a care partner (family, friend, neighbor, etc.) to stay with the patient for the first few nights after surgery  The inability to secure help at home may postpone surgery  Home Care Services set up per surgeon order  Physical Therapy  Occupational Therapy  **If desired, private duty care can be arranged by the patient ahead of time**  Outpatient Physical Therapy per surgeon order    Recovering at Home  Wound Care  Follow wound care instructions found in your discharge paperwork  Bandage is water-resistant and you may shower with the bandage  Do not scrub directly over the bandage  Do not submerge in water until cleared (bathtub, hot tub, pool, etc.)    Post-Op Risk Prevention  Infection Prevention  Promptly seek treatment for any infections post-operatively  Routine dental visits must be postponed for 3 months after surgery  Your surgeon may require antibiotics prior to future dental visits  Any concerns for infection not related directly to the knee or the hip should be managed by your primary care provider  Blood Clots  Be sure to complete the course of blood thinning medication as prescribed by your surgeon  Movement every 1-2 hours during the day is encouraged to prevent blood clots  Monitor for signs of blood clots  Wear compression stockings as prescribed by your surgeon  Constipation  Constipation is common following surgery  Drink plenty of fluids  Take stool softener/laxative as prescribed by your surgeon  Move around frequently  Eat foods high in fiber  Fall Prevention  Prepare home ahead of time to clear space to move with walker  Remove throw rugs and electrical cords from walkways  Install railings near any stairways with more than 2 steps  Use night lights for increased visibility at night  Continue to use your assistive device until cleared by surgeon or physical  therapy  Dislocation Prevention - Not all procedures will have dislocation precautions  Follow dislocation precautions provided by your surgeon  It is OK to resume sexual activity about 6 weeks following surgery  Be sure to follow any dislocation precautions assigned    Durable Medical Equipment  Cold Therapy  Breg Cold Therapy Machines  Ice/Gel Packs  Assistive Devices  Folding Walker with Wheels (in the front only)  No Rollators  Crutches if approved by Physical Therapy and Surgeon after surgery  Hip Kits  Raised Toilet Seats  Additional Compression Stockings    Joint Preservation  Healthy Activities when Cleared  Walking  Swimming  Bike Riding  Activities to Avoid  Refrain from repetitive motions which have a high impact on the joint  Gradual Progression  Progress activity slowly, listen to your body  Common Findings - NORMAL after surgery  Clicking/Grinding  Numbness near incision    Physical Therapy  Prehabilitation exercises  START TODAY ON BOTH LEGS  Surgery Specific Precautions  Follow surgery specific precautions found in your discharge paperwork    Follow-Up Visit  All patients will see their surgeon for a follow up visit after surgery  The visit may range from 2-6 weeks after surgery and is surgeon specific      Please don't hesitate to reach out if you have any additional questions or concerns.    Sandi Salmeron MBA, BSN, RN-BC, ONC  Katty Saravia RN  [facility]  [facility]   [phone]

## 2024-12-18 NOTE — H&P (VIEW-ONLY)
"CPM/PAT Evaluation       Name: Kelli Brito (Kelli Brito \"Cecelia\")  /Age: 1960/64 y.o.     Visit Type:   In-Person       Chief Complaint: left hip pain    HPI: Patient is a 63 yo F scheduled for left total hip arthoplasty on 01/10/2025 with Dr. Razo secondary to left hip osteoarthritis. Patient was referred by Dr. Razo to [facility] today for perioperative risk stratification and optimization. Patient's PMHx is notable for HLD, HTN, GERD, Graves disease/Hyperthyroid, SHITAL, Endometrial/uterine cancer      Past Medical History:   Diagnosis Date    Anxiety disorder, unspecified 10/27/2015    Anxiety    Endometrial cancer (Multi)     Hypercholesteremia     Hyperlipidemia     Hyperthyroidism     Obesity     Other seasonal allergic rhinitis 2015    Seasonal allergies    Personal history of irradiation     Personal history of malignant neoplasm, unspecified     History of malignant neoplasm    Personal history of other diseases of the circulatory system     History of hypertension    Personal history of other diseases of the digestive system     History of gastroesophageal reflux (GERD)    Personal history of other diseases of the musculoskeletal system and connective tissue     History of polymyalgia rheumatica    Personal history of other diseases of the nervous system and sense organs     History of sleep apnea    Personal history of urinary calculi     History of renal calculi    Sleep apnea     Thyroid nodule     Uterine cancer (Multi)        Past Surgical History:   Procedure Laterality Date    HIP ARTHROPLASTY Right     HYSTERECTOMY      OTHER SURGICAL HISTORY  2022    Nose surgery    OTHER SURGICAL HISTORY  2022    Dilation and curettage    US GUIDED FINE PERCUTANEOUS ASPIRATION  2022    US GUIDED FINE PERCUTANEOUS ASPIRATION [facility]       Patient  has no history on file for sexual activity.    Family History   Problem Relation Name Age of Onset    " Lung cancer Father Sudhakar     Cancer Father Sudhakar     Lung cancer Paternal Grandmother Hailey     Cancer Paternal Grandmother Hailey     Cancer Father's Sister Hattie        Allergies   Allergen Reactions    Azithromycin Nausea Only and Unknown    Biotene Dry Mouth Hives    Grass Pollen Itching    Loratadine Unknown    Mold Other    Saliva Stimulant Comb. No.3 Hives     biotin    Gabapentin Anxiety and Other       Prior to Admission medications    Medication Sig Start Date End Date Taking? Authorizing Provider   atorvastatin (Lipitor) 20 mg tablet TAKE 1 TABLET BY MOUTH EVERY DAY 11/17/24   Jaxon Giraldo MD   methIMAzole (Tapazole) 10 mg tablet Take 2 pills daily for 2 weeks, then stay on 1 pill daily until next visit 11/20/24   Daniel Munson MD        PAT ROS:   Constitutional:   neg    Neuro/Psych:   neg    Eyes:   neg    Ears:   neg    Nose:   neg    Mouth:   neg    Throat:   neg    Neck:   neg    Cardio:   neg    Respiratory:   neg    Endocrine:   neg    GI:   neg    :   neg    Musculoskeletal:    +left hip pain  Hematologic:   neg    Skin:  neg        Physical Exam  Vitals and nursing note reviewed.   Constitutional:       General: She is not in acute distress.     Appearance: She is not ill-appearing or toxic-appearing.   HENT:      Head: Normocephalic and atraumatic.      Nose: Nose normal.      Mouth/Throat:      Mouth: Mucous membranes are moist.   Eyes:      Extraocular Movements: Extraocular movements intact.      Conjunctiva/sclera: Conjunctivae normal.   Neck:      Vascular: No carotid bruit.   Cardiovascular:      Rate and Rhythm: Normal rate and regular rhythm.      Pulses: Normal pulses.      Heart sounds: Normal heart sounds. No murmur heard.  Pulmonary:      Effort: Pulmonary effort is normal.      Breath sounds: Normal breath sounds.   Abdominal:      General: There is no distension.      Palpations: Abdomen is soft.      Tenderness: There is no abdominal tenderness.   Musculoskeletal:     "     General: Normal range of motion.      Cervical back: Normal range of motion.   Skin:     General: Skin is warm and dry.      Capillary Refill: Capillary refill takes less than 2 seconds.   Neurological:      General: No focal deficit present.      Mental Status: She is alert.   Psychiatric:         Mood and Affect: Mood normal.         Behavior: Behavior normal.          PAT AIRWAY:   Airway:     Mallampati::  II    TM distance::  >3 FB    Neck ROM::  Full      Visit Vitals  /90   Pulse 71   Temp 36.6 °C (97.9 °F) (Temporal)   Resp 18   Ht 1.676 m (5' 6\")   Wt 107 kg (234 lb 12.6 oz)   SpO2 99%   BMI 37.90 kg/m²   OB Status Postmenopausal   Smoking Status Never   BSA 2.23 m²       DASI Risk Score      Flowsheet Row Questionnaire Series Submission from 12/2/2024 in [facility] with Generic Provider Sam   Can you take care of yourself (eat, dress, bathe, or use toilet)?  2.75  filed at 12/02/2024 1050   Can you walk indoors, such as around your house? 1.75  filed at 12/02/2024 1050   Can you walk a block or two on level ground?  2.75  filed at 12/02/2024 1050   Can you climb a flight of stairs or walk up a hill? 5.5  filed at 12/02/2024 1050   Can you run a short distance? 0  filed at 12/02/2024 1050   Can you do light work around the house like dusting or washing dishes? 2.7  filed at 12/02/2024 1050   Can you do moderate work around the house like vacuuming, sweeping floors or carrying groceries? 3.5  filed at 12/02/2024 1050   Can you do heavy work around the house like scrubbing floors or lifting and moving heavy furniture?  0  filed at 12/02/2024 1050   Can you do yard work like raking leaves, weeding or pushing a mower? 0  filed at 12/02/2024 1050   Can you have sexual relations? 0  filed at 12/02/2024 1050   Can you participate in moderate recreational activities like golf, bowling, dancing, doubles tennis or throwing a baseball or football? 0  filed at 12/02/2024 1057   Can you participate in " strenous sports like swimming, singles tennis, football, basketball, or skiing? 0  filed at 12/02/2024 1050   DASI SCORE 18.95  filed at 12/02/2024 1050   METS Score (Will be calculated only when all the questions are answered) 5.1  filed at 12/02/2024 1050          Caprini DVT Assessment      Flowsheet Row Pre-Admission Testing from 12/18/2024 in [facility]   DVT Score 12 filed at 12/18/2024 1301   Medical Factors Present cancer, chemotherapy, or previous malignancy filed at 12/18/2024 1301   Surgical Factors Elective major lower extremity arthroplasty filed at 12/18/2024 1301   BMI 31-40 (Obesity) filed at 12/18/2024 1301          Modified Frailty Index      Flowsheet Row Pre-Admission Testing from 12/18/2024 in [facility]   Non-independent functional status (problems with dressing, bathing, personal grooming, or cooking) 0 filed at 12/18/2024 1302   History of diabetes mellitus  0 filed at 12/18/2024 1302   History of COPD 0 filed at 12/18/2024 1302   History of CHF No filed at 12/18/2024 1302   History of MI 0 filed at 12/18/2024 1302   History of Percutaneous Coronary Intervention, Cardiac Surgery, or Angina No filed at 12/18/2024 1302   Hypertension requiring the use of medication  0 filed at 12/18/2024 1302   Peripheral vascular disease 0 filed at 12/18/2024 1302   Impaired sensorium (cognitive impairement or loss, clouding, or delirium) 0 filed at 12/18/2024 1302   TIA or CVA withouy residual deficit 0 filed at 12/18/2024 1302   Cerebrovascular accident with deficit 0 filed at 12/18/2024 1302   Modified Frailty Index Calculator 0 filed at 12/18/2024 1302          CHADS2 Stroke Risk  Current as of about an hour ago        N/A 3 to 100%: High Risk   2 to < 3%: Medium Risk   0 to < 2%: Low Risk     Last Change: N/A          This score determines the patient's risk of having a stroke if the patient has atrial fibrillation.        This score is not applicable to this patient.  Components are not calculated.          Revised Cardiac Risk Index      Flowsheet Row Pre-Admission Testing from 12/18/2024 in [facility]   High-Risk Surgery (Intraperitoneal, Intrathoracic,Suprainguinal vascular) 0 filed at 12/18/2024 1302   History of ischemic heart disease (History of MI, History of positive exercuse test, Current chest paint considered due to myocardial ischemia, Use of nitrate therapy, ECG with pathological Q Waves) 0 filed at 12/18/2024 1302   History of congestive heart failure (pulmonary edemia, bilateral rales or S3 gallop, Paroxysmal nocturnal dyspnea, CXR showing pulmonary vascular redistribution) 0 filed at 12/18/2024 1302   History of cerebrovascular disease (Prior TIA or stroke) 0 filed at 12/18/2024 1302   Pre-operative insulin treatment 0 filed at 12/18/2024 1302   Pre-operative creatinine>2 mg/dl 0 filed at 12/18/2024 1302   Revised Cardiac Risk Calculator 0 filed at 12/18/2024 1302          Apfel Simplified Score      Flowsheet Row Pre-Admission Testing from 12/18/2024 in [facility]   Smoking status 1 filed at 12/18/2024 1302   History of motion sickness or PONV  0 filed at 12/18/2024 1302   Use of postoperative opioids 0 filed at 12/18/2024 1302   Gender - Female 1=Yes filed at 12/18/2024 1302   Apfel Simplified Score Calculator 2 filed at 12/18/2024 1302          Risk Analysis Index Results This Encounter    No data found in the last 10 encounters.       Stop Bang Score      Flowsheet Row Pre-Admission Testing from 12/18/2024 in [facility]   Do you snore loudly? 1 filed at 12/18/2024 1236   Do you often feel tired or fatigued after your sleep? 1 filed at 12/18/2024 1236   Has anyone ever observed you stop breathing in your sleep? 1 filed at 12/18/2024 1236   Do you have or are you being treated for high blood pressure? 0 filed at 12/18/2024 1236   Recent BMI (Calculated) 37.9 filed at 12/18/2024 1236   Is BMI greater than 35  kg/m2? 1=Yes filed at 12/18/2024 1236   Age older than 50 years old? 1=Yes filed at 12/18/2024 1236   Is your neck circumference greater than 17 inches (Male) or 16 inches (Female)? 0 filed at 12/18/2024 1236   Gender - Male 0=No filed at 12/18/2024 1236   STOP-BANG Total Score 5 filed at 12/18/2024 1236          Prodigy: High Risk  Total Score: 13              Prodigy Age Score      Prodigy SDB Score          ARISCAT Score for Postoperative Pulmonary Complications      Flowsheet Row Pre-Admission Testing from 12/18/2024 in [facility]   Age, years  3 filed at 12/18/2024 1302   Preoperative SpO2 0 filed at 12/18/2024 1302   Respiratory infection in the last month Either upper or lower (i.e., URI, bronchitis, pneumonia), with fever and antibiotic treatment 0 filed at 12/18/2024 1302   Preoperative anemoa (Hgb less than 10 g/dl) 0 filed at 12/18/2024 1302   Surgical incision  0 filed at 12/18/2024 1302   Duration of surgery  16 filed at 12/18/2024 1302   Emergency Procedure  0 filed at 12/18/2024 1302   ARISCAT Total Score  19 filed at 12/18/2024 1302          El Perioperative Risk for Myocardial Infarction or Cardiac Arrest (LIZZIE)      Flowsheet Row Pre-Admission Testing from 12/18/2024 in [facility]   Age 1.3 filed at 12/18/2024 1303   Functional Status  0 filed at 12/18/2024 1303   ASA Class  -3.29 filed at 12/18/2024 1303   Creatinine 0 filed at 12/18/2024 1303   Type of Procedure  0.80 filed at 12/18/2024 1303   LIZZIE Total Score  -6.44 filed at 12/18/2024 1303   LIZZIE % 0.16 filed at 12/18/2024 1303            Assessment & Plan    Neuro:    No diagnosis or significant findings on chart review or clinical presentation and evaluation.    The patient is at an increased risk for post operative delirium secondary to age >/= 65.  Preoperative brain exercise educational handout provided to patient.    The patient is at an increased risk for perioperative stroke secondary to increased  age, HLD, and female sex .    HEENT/Airway:   -SHITAL - compliant with CPAP; Recommend prioritizing non-opioid analgesic techniques (regional and local anesthesia, nonsteroidal medications, etc) before the administration of opioids and close monitoring for hypoventilation after surgery due to suspected SHITAL. If intravenous narcotics are needed beyond the immediate raheel-operative period, the patient may benefit from continuous pulse oximetry to monitor for hypoxic events till baseline Sp02 is normal on room air and a respiratory therapy evaluation.   STOP-BANG Score- 5 points high risk for SHITAL    Mallampati::  II    TM distance::  >3 FB    Neck ROM::  Full  Dentures-denies  Crowns-reports  Implants-reports multiple upper and lower    Cardiovascular:    - Elevated BP today due to pain; /90- discussed ED precautions with patient, no blurry vision, no other concerning sx  - HLD- on atorvastatin (continue)  No additional preoperative testing is currently indicated.  METS: 5.1, activity limited due to hip  RCRI: 0 points, 3.9%    30 day risk of MACE (risk for cardiac death, nonfatal myocardial infarction, and nonfactal cardiac arrest  LIZZIE: 0.16   % risk of intraoperative or 30-day postoperative MACE  EKG -reviewed from 11/25/24, normal simus Rate 82, scanned into media      Pulmonary:     No diagnosis or significant findings on chart review or clinical presentation and evaluation.   ARISCAT: <26 points, 1.6% risk of in-hospital postoperative pulmonary complication  PRODIGY: Moderate risk for opioid induced respiratory depression  Smoking History-She has never smoked.  Preoperative deep breathing educational handout provided to patient.    Renal:  No diagnosis or significant findings on chart review or clinical presentation and evaluation, however, the patient is at increased risk of perioperative renal complications secondary to age>/= 56. Preventative measures include BP monitoring, medication compliance, and  hydration management.   CMP-Pending    Endocrine:    -Graves/Hyperthyroidism- started on methimazole; last TSH 11/18/24 <.01  - follows with endocrinology - denies any symptoms at this time    Hematologic:    No diagnosis or significant findings on chart review or clinical presentation and evaluation.  The patient is not a Jehovah’s witness and will accept blood and blood products if medically indicated.   History of previous blood transfusions No  CBC today unremarkable    Caprini Score 12, patient at High for postoperative DVT. Pt supplied education/VTE handout  Anticoagulation use: Yes - asa 81mg for prevention  Preoperative DVT educational handout provided to patient.    Gastrointestinal:     -GERD - not on any meds, only occasional  Recreational drug use: Drug use No  Alcohol use none    Apfel: 1 points 21% risk for post operative N/V    Infectious disease:    No diagnosis or significant findings on chart review or clinical presentation and evaluation.   Prescription provided for CHG body wash and dental rinse. CHG use instructions reviewed and provided to patient. Patient advised to call [facility] if rx not received.   Staph screen collected-Pending    Musculoskeletal:    -left hip osteoarthritis- scheduled for surgery    Gyn:  -Endometrial/uterine cancer - s/p hysterectomy and radiation 11/2022, follows with gynonc      Anesthesia:  ASA 2 - Patient with mild systemic disease with no functional limitations  History of General anesthesia- yes  Complications- No anesthesia complications  No family history of anesthesia complications    Abnormalities noted on PAT evaluation: No    Nickel/metal allergy-negative  Shellfish allergy-negative    Discussed with patient medication instructions, NPO guidelines, and any questions or concerns. Patient aware she will need clearance from endocrinology prior to surgery      Sammie De Los Santos PA-C 12/18/2024 1:12 PM    Addendum 01/03/25  Received clearance from Dr. Munson with  endocrinology, clear for surgery from thyroid standpoint. Scanned into media

## 2024-12-18 NOTE — PREPROCEDURE INSTRUCTIONS
Medication List            Accurate as of December 18, 2024 12:53 PM. Always use your most recent med list.                aspirin 81 mg EC tablet  Additional Medication Adjustments for Surgery: Take last dose 7 days before surgery  Notes to patient: Last dose 01/02/2025     atorvastatin 20 mg tablet  Commonly known as: Lipitor  TAKE 1 TABLET BY MOUTH EVERY DAY  Medication Adjustments for Surgery: Take/Use as prescribed     CALTRATE 600 ORAL  Additional Medication Adjustments for Surgery: Take last dose 7 days before surgery  Notes to patient: Last dose 01/02/2025     * chlorhexidine 0.12 % solution  Commonly known as: Peridex  Swish and spit 15 mL night before surgery and morning of surgery     * chlorhexidine 4 % external liquid  Commonly known as: Hibiclens  Apply topically once daily for 5 days. Start using 1/6/25     ibuprofen 200 mg tablet  Additional Medication Adjustments for Surgery: Take last dose 7 days before surgery  Notes to patient: Last dose 01/02/2025     METAMUCIL (SUGAR) ORAL  Medication Adjustments for Surgery: Do Not take on the morning of surgery     methIMAzole 10 mg tablet  Commonly known as: Tapazole  Take 2 pills daily for 2 weeks, then stay on 1 pill daily until next visit  Medication Adjustments for Surgery: Take/Use as prescribed           * This list has 2 medication(s) that are the same as other medications prescribed for you. Read the directions carefully, and ask your doctor or other care provider to review them with you.                            Thank you for visiting [facility].  If you have any changes to your health condition, please call the surgeons office to alert them and give them details of your symptoms.    Sammie De Los Santos PA-C  P: [phone]  Department of Anesthesiology and Perioperative Medicine  --    Preoperative Fasting Guidelines    Why must I stop eating and drinking near surgery time?  With sedation, food or liquid in your stomach can  enter your lungs causing serious complications  Increases nausea and vomiting    When do I need to stop eating and drinking before my surgery?  Do not eat any food after midnight the night before your surgery/procedure.  You may have up to 13.5 ounces of clear liquid until TWO hours before your instructed arrival time to the hospital.  This includes water, black tea/coffee, (no milk or cream) apple juice, and electrolyte drinks (Gatorade)  You may chew gum until TWO hours before your surgery/procedure              Preoperative Brain Exercises    What are brain exercises?  A brain exercise is any activity that engages your thinking (cognitive) skills.    What types of activities are considered brain exercises?  Jigsaw puzzles, crossword puzzles, word jumble, memory games, word search, and many more.  Many can be found free online or on your phone via a mobile damion.    Why should I do brain exercises before my surgery?  More recent research has shown brain exercise before surgery can lower the risk of postoperative delirium (confusion) which can be especially important for older adults.  Patients who did brain exercises for 5 to 10 hours the days before surgery, cut their risk of postoperative delirium in half up to 1 week after surgery.                  [facility]    Preoperative Deep Breathing Exercises    Why it is important to do deep breathing exercises before my surgery?  Deep breathing exercises strengthen your breathing muscles.  This helps you to recover after your surgery and decreases the chance of breathing complications.      How are the deep breathing exercises done?  Sit straight with your back supported.  Breathe in deeply and slowly through your nose. Your lower rib cage should expand and your abdomen may move forward.  Hold that breath for 3 to 5 seconds.  Breathe out through pursed lips, slowly and completely.  Rest and repeat 10 times every hour while awake.  Rest longer  if you become dizzy or lightheaded.      Patient Information: Incentive Spirometer  What is an incentive spirometer?  An incentive spirometer is a device used before and after surgery to “exercise” your lungs.  It helps you to take deeper breaths to expand your lungs.  Below is an example of a basic incentive spirometer.  The device you receive may differ slightly but they all function the same.    Why do I need to use an incentive spirometer?  Using your incentive spirometer prepares your lungs for surgery and helps prevent lung problems after surgery.  How do I use my incentive spirometer?  When you're using your incentive spirometer, make sure to breathe through your mouth. If you breathe through your nose, the incentive spirometer won't work properly. You can hold your nose if you have trouble.  If you feel dizzy at any time, stop and rest. Try again at a later time.  Follow the steps below:  Set up your incentive spirometer, expand the flexible tubing and connect to the outlet.  Sit upright in a chair or bed. Hold the incentive spirometer at eye level.   Put the mouthpiece in your mouth and close your lips tightly around it. Slowly breathe out (exhale) completely.  Breathe in (inhale) slowly through your mouth as deeply as you can. As you take a breath, you will see the piston rise inside the large column. While the piston rises, the indicator should move upwards. It should stay in between the 2 arrows (see Figure).  Try to get the piston as high as you can, while keeping the indicator between the arrows.   If the indicator doesn't stay between the arrows, you're breathing either too fast or too slow.  When you get it as high as you can, hold your breath for 10 seconds, or as long as possible. While you're holding your breath, the piston will slowly fall to the base of the spirometer.  Once the piston reaches the bottom of the spirometer, breathe out slowly through your mouth. Rest for a few seconds.  Repeat 10  times. Try to get the piston to the same level with each breath.  Repeat every hour while awake  You can carefully clean the outside of the mouthpiece with an alcohol wipe or soap and water.            Patient and Family Education             Ways You Can Help Prevent Blood Clots             This handout explains some simple things you can do to help prevent blood clots.      Blood clots are blockages that can form in the body's veins. When a blood clot forms in your deep veins, it may be called a deep vein thrombosis, or DVT for short. Blood clots can happen in any part of the body where blood flows, but they are most common in the arms and legs. If a piece of a blood clot breaks free and travels to the lungs, it is called a pulmonary embolus (PE). A PE can be a very serious problem.         Being in the hospital or having surgery can raise your chances of getting a blood clot because you may not be well enough to move around as much as you normally do.         Ways you can help prevent blood clots in the hospital         Wearing SCDs. SCDs stands for Sequential Compression Devices.   SCDs are special sleeves that wrap around your legs  They attach to a pump that fills them with air to gently squeeze your legs every few minutes.   This helps return the blood in your legs to your heart.   SCDs should only be taken off when walking or bathing.   SCDs may not be comfortable, but they can help save your life.               Wearing compression stockings - if your doctor orders them. These special snug fitting stockings gently squeeze your legs to help blood flow.       Walking. Walking helps move the blood in your legs.   If your doctor says it is ok, try walking the halls at least   5 times a day. Ask us to help you get up, so you don't fall.      Taking any blood thinning medicines your doctor orders.             ©[facility]; 3/23       Ways you can help prevent blood clots at home       Wearing compression  stockings - if your doctor orders them. ? Walking - to help move the blood in your legs.       Taking any blood thinning medicines your doctor orders.      Signs of a blood clot or PE      Tell your doctor or nurse know right away if you have of the problems listed below.    If you are at home, seek medical care right away. Call 911 for chest pain or problems breathing.               Signs of a blood clot (DVT) - such as pain,  swelling, redness or warmth in your arm or leg      Signs of a pulmonary embolism (PE) - such as chest     pain or feeling short of breath           The Week before Surgery        Seven days before Surgery  Check your [facility] medication instructions  Do the exercises provided to you by [facility]   Arrange for a responsible, adult licensed  to take you home after surgery and stay with you for 24 hours.  You will not be permitted to drive yourself home if you have received any anesthetic/sedation  Six days before surgery  Check your [facility] medication instructions  Do the exercises provided to you by [facility]   Start using Chlorhexidene (CHG) body wash if prescribed  Five days before surgery  Check your [facility] medication instructions  Do the exercises provided to you by [facility]   Continue to use CHG body wash if prescribed  Three days before surgery  Check your [facility] medication instructions  Do the exercises provided to you by [facility]   Continue to use CHG body wash if prescribed  Two days before surgery  Check your [facility] medication instructions  Do the exercises provided to you by [facility]   Continue to use CHG body wash if prescribed    The Day before Surgery       Check your [facility] medication and all other [facility] instructions including when to stop eating and drinking  You will be called with your arrival time for surgery in the late afternoon.  If you do not receive a call please reach out to your surgeon's office.  Do not smoke or drink 24 hours before surgery  Prepare items to bring with you to the hospital  Shower with your  chlorhexidine wash if prescribed  Brush your teeth and use your chlorhexidine dental rinse if prescribed    The Day of Surgery       Check your [facility] medication instructions  Ensure you follow the instructions for when to stop eating and drinking  Shower, if prescribed use CHG.  Do not apply any lotions, creams, moisturizers, perfume or deodorant  Brush your teeth and use your CHG dental rinse if prescribed  Wear loose comfortable clothing  Avoid make-up  Remove  jewelry and piercings, consider professional piercing removal with a plastic spacer if needed  Bring photo ID and Insurance card  Bring an accurate medication list that includes medication dose, frequency and allergies  Bring a copy of your advanced directives (will, health care power of )  Bring any devices and controllers as well as medical devices you have been provided with for surgery (CPAP, slings, braces, etc.)  Dentures, eyeglasses, and contacts will be removed before surgery, please bring cases for contacts or glasses        Patient Information: Pre-Operative Infection Prevention Measures     Why did I have my nose, under my arms, and groin swabbed?  The purpose of the swab is to identify Staphylococcus aureus inside your nose or on your skin.  The swab was sent to the laboratory for culture.  A positive swab/culture for Staphylococcus aureus is called colonization or carriage.      What is Staphylococcus aureus?  Staphylococcus aureus, also known as “staph”, is a germ found on the skin or in the nose of healthy people.  Sometimes Staphylococcus aureus can get into the body and cause an infection.  This can be minor (such as pimples, boils, or other skin problems).  It might also be serious (such as a blood infection, pneumonia, or a surgical site infection).    What is Staphylococcus aureus colonization or carriage?  Colonization or carriage means that a person has the germ but is not sick from it.  These bacteria can be spread on the hands  or when breathing or sneezing.    How is Staphylococcus aureus spread?  It is most often spread by close contact with a person or item that carries it.    What happens if my culture is positive for Staphylococcus aureus?  Your doctor/medical team will use this information to guide any antibiotic treatment which may be necessary.  Regardless of the culture results, we will clean the inside of your nose with a betadine swab just before you have your surgery.      Will I get an infection if I have Staphylococcus aureus in my nose or on my skin?  Anyone can get an infection with Staphylococcus aureus.  However, the best way to reduce your risk of infection is to follow the instructions provided to you for the use of your CHG soap and dental rinse.        Patient Information: Oral/Dental Rinse    What is oral/dental rinse?   It is a mouthwash. It is a way of cleaning the mouth with a germ-killing solution before your surgery.  The solution contains chlorhexidine, commonly known as CHG.   It is used inside the mouth to kill a bacteria known as Staphylococcus aureus.  Let your doctor know if you are allergic to Chlorhexidine.    Why do I need to use CHG oral/dental rinse?  The CHG oral/dental rinse helps to kill a bacteria in your mouth known as Staphylococcus aureus.     This reduces the risk of infection at the surgical site.      Using your CHG oral/dental rinse  STEPS:  Use your CHG oral/dental rinse after you brush your teeth the night before (at bedtime) and the morning of your surgery.  Follow all directions on your prescription label.    Use the cap on the container to measure 15ml   Swish (gargle if you can) the mouthwash in your mouth for at least 30 seconds, (do not swallow) and spit out  After you use your CHG rinse, do not rinse your mouth with water, drink or eat.  Please refer to the prescription label for the appropriate time to resume oral intake      What side effects might I have using the CHG  oral/dental rinse?  CHG rinse will stick to plaque on the teeth.  Brush and floss just before use.  Teeth brushing will help avoid staining of plaque during use.      Patient Information: Home Preoperative Antibacterial Shower      What is a home preoperative antibacterial shower?  This shower is a way of cleaning the skin with a germ-killing solution before surgery.  The solution contains chlorhexidine, commonly known as CHG.  CHG is a skin cleanser with germ-killing ability.  Let your doctor know if you are allergic to chlorhexidine.    Why do I need to take a preoperative antibacterial shower?  Skin is not sterile.  It is best to try to make your skin as free of germs as possible before surgery.  Proper cleansing with a germ-killing soap before surgery can lower the number of germs on your skin.  This helps to reduce the risk of infection at the surgical site.  Following the instructions listed below will help you prepare your skin for surgery.      How do I use the solution?  Steps:  Begin using your CHG soap 5 days before your scheduled surgery on ____1/6/25____________________.    First, wash and rinse your hair using the CHG soap. Keep CHG soap away from ear canals and eyes.  Rinse completely, do not condition.  Hair extensions should be removed.  Wash your face with your normal soap and rinse.    Apply the CHG solution to a clean wet washcloth.  Turn the water off or move away from the water spray to avoid premature rinsing of the CHG soap as you are applying.   Firmly lather your entire body from the neck down.  Do not use on your face.  Pay special attention to the area(s) where your incision(s) will be located unless they are on your face.  Avoid scrubbing your skin too hard.  The important point is to have the CHG soap sit on your skin for 3 minutes.    When the 3 minutes are up, turn on the water and rinse the CHG solution off your body completely.   DO NOT wash with regular soap after you have used the  CHG soap solution  Pat yourself dry with a clean, freshly-laundered towel.  DO NOT apply powders, deodorants, or lotions.  Dress in clean, freshly laundered nightclothes.    Be sure to sleep with clean, freshly laundered sheets.  Be aware that CHG will cause stains on fabrics; if you wash them with bleach after use.  Rinse your washcloth and other linens that have contact with CHG completely.  Use only non-chlorine detergents to launder the items used.   The morning of surgery is the fifth day.  Repeat the above steps and dress in clean comfortable clothing     Whom should I contact if I have any questions regarding the use of CHG soap?  Call the [facility]

## 2024-12-18 NOTE — CPM/PAT H&P
"CPM/PAT Evaluation       Name: Kelli Brito (Kelli Brito \"Cecelia\")  /Age: 1960/64 y.o.     Visit Type:   In-Person       Chief Complaint: left hip pain    HPI: Patient is a 65 yo F scheduled for left total hip arthoplasty on 01/10/2025 with Dr. Razo secondary to left hip osteoarthritis. Patient was referred by Dr. Razo to [facility] today for perioperative risk stratification and optimization. Patient's PMHx is notable for HLD, HTN, GERD, Graves disease/Hyperthyroid, SHITAL, Endometrial/uterine cancer      Past Medical History:   Diagnosis Date    Anxiety disorder, unspecified 10/27/2015    Anxiety    Endometrial cancer (Multi)     Hypercholesteremia     Hyperlipidemia     Hyperthyroidism     Obesity     Other seasonal allergic rhinitis 2015    Seasonal allergies    Personal history of irradiation     Personal history of malignant neoplasm, unspecified     History of malignant neoplasm    Personal history of other diseases of the circulatory system     History of hypertension    Personal history of other diseases of the digestive system     History of gastroesophageal reflux (GERD)    Personal history of other diseases of the musculoskeletal system and connective tissue     History of polymyalgia rheumatica    Personal history of other diseases of the nervous system and sense organs     History of sleep apnea    Personal history of urinary calculi     History of renal calculi    Sleep apnea     Thyroid nodule     Uterine cancer (Multi)        Past Surgical History:   Procedure Laterality Date    HIP ARTHROPLASTY Right     HYSTERECTOMY      OTHER SURGICAL HISTORY  2022    Nose surgery    OTHER SURGICAL HISTORY  2022    Dilation and curettage    US GUIDED FINE PERCUTANEOUS ASPIRATION  2022    US GUIDED FINE PERCUTANEOUS ASPIRATION [facility]       Patient  has no history on file for sexual activity.    Family History   Problem Relation Name Age of Onset    " Lung cancer Father Sudhakar     Cancer Father Sudhakar     Lung cancer Paternal Grandmother Hailey     Cancer Paternal Grandmother Hailey     Cancer Father's Sister Hattie        Allergies   Allergen Reactions    Azithromycin Nausea Only and Unknown    Biotene Dry Mouth Hives    Grass Pollen Itching    Loratadine Unknown    Mold Other    Saliva Stimulant Comb. No.3 Hives     biotin    Gabapentin Anxiety and Other       Prior to Admission medications    Medication Sig Start Date End Date Taking? Authorizing Provider   atorvastatin (Lipitor) 20 mg tablet TAKE 1 TABLET BY MOUTH EVERY DAY 11/17/24   Jaxon Giraldo MD   methIMAzole (Tapazole) 10 mg tablet Take 2 pills daily for 2 weeks, then stay on 1 pill daily until next visit 11/20/24   Daniel Munson MD        PAT ROS:   Constitutional:   neg    Neuro/Psych:   neg    Eyes:   neg    Ears:   neg    Nose:   neg    Mouth:   neg    Throat:   neg    Neck:   neg    Cardio:   neg    Respiratory:   neg    Endocrine:   neg    GI:   neg    :   neg    Musculoskeletal:    +left hip pain  Hematologic:   neg    Skin:  neg        Physical Exam  Vitals and nursing note reviewed.   Constitutional:       General: She is not in acute distress.     Appearance: She is not ill-appearing or toxic-appearing.   HENT:      Head: Normocephalic and atraumatic.      Nose: Nose normal.      Mouth/Throat:      Mouth: Mucous membranes are moist.   Eyes:      Extraocular Movements: Extraocular movements intact.      Conjunctiva/sclera: Conjunctivae normal.   Neck:      Vascular: No carotid bruit.   Cardiovascular:      Rate and Rhythm: Normal rate and regular rhythm.      Pulses: Normal pulses.      Heart sounds: Normal heart sounds. No murmur heard.  Pulmonary:      Effort: Pulmonary effort is normal.      Breath sounds: Normal breath sounds.   Abdominal:      General: There is no distension.      Palpations: Abdomen is soft.      Tenderness: There is no abdominal tenderness.   Musculoskeletal:     "     General: Normal range of motion.      Cervical back: Normal range of motion.   Skin:     General: Skin is warm and dry.      Capillary Refill: Capillary refill takes less than 2 seconds.   Neurological:      General: No focal deficit present.      Mental Status: She is alert.   Psychiatric:         Mood and Affect: Mood normal.         Behavior: Behavior normal.          PAT AIRWAY:   Airway:     Mallampati::  II    TM distance::  >3 FB    Neck ROM::  Full      Visit Vitals  /90   Pulse 71   Temp 36.6 °C (97.9 °F) (Temporal)   Resp 18   Ht 1.676 m (5' 6\")   Wt 107 kg (234 lb 12.6 oz)   SpO2 99%   BMI 37.90 kg/m²   OB Status Postmenopausal   Smoking Status Never   BSA 2.23 m²       DASI Risk Score      Flowsheet Row Questionnaire Series Submission from 12/2/2024 in [facility] with Generic Provider Sam   Can you take care of yourself (eat, dress, bathe, or use toilet)?  2.75  filed at 12/02/2024 1050   Can you walk indoors, such as around your house? 1.75  filed at 12/02/2024 1050   Can you walk a block or two on level ground?  2.75  filed at 12/02/2024 1050   Can you climb a flight of stairs or walk up a hill? 5.5  filed at 12/02/2024 1050   Can you run a short distance? 0  filed at 12/02/2024 1050   Can you do light work around the house like dusting or washing dishes? 2.7  filed at 12/02/2024 1050   Can you do moderate work around the house like vacuuming, sweeping floors or carrying groceries? 3.5  filed at 12/02/2024 1050   Can you do heavy work around the house like scrubbing floors or lifting and moving heavy furniture?  0  filed at 12/02/2024 1050   Can you do yard work like raking leaves, weeding or pushing a mower? 0  filed at 12/02/2024 1050   Can you have sexual relations? 0  filed at 12/02/2024 1050   Can you participate in moderate recreational activities like golf, bowling, dancing, doubles tennis or throwing a baseball or football? 0  filed at 12/02/2024 1052   Can you participate in " strenous sports like swimming, singles tennis, football, basketball, or skiing? 0  filed at 12/02/2024 1050   DASI SCORE 18.95  filed at 12/02/2024 1050   METS Score (Will be calculated only when all the questions are answered) 5.1  filed at 12/02/2024 1050          Caprini DVT Assessment      Flowsheet Row Pre-Admission Testing from 12/18/2024 in [facility]   DVT Score 12 filed at 12/18/2024 1301   Medical Factors Present cancer, chemotherapy, or previous malignancy filed at 12/18/2024 1301   Surgical Factors Elective major lower extremity arthroplasty filed at 12/18/2024 1301   BMI 31-40 (Obesity) filed at 12/18/2024 1301          Modified Frailty Index      Flowsheet Row Pre-Admission Testing from 12/18/2024 in [facility]   Non-independent functional status (problems with dressing, bathing, personal grooming, or cooking) 0 filed at 12/18/2024 1302   History of diabetes mellitus  0 filed at 12/18/2024 1302   History of COPD 0 filed at 12/18/2024 1302   History of CHF No filed at 12/18/2024 1302   History of MI 0 filed at 12/18/2024 1302   History of Percutaneous Coronary Intervention, Cardiac Surgery, or Angina No filed at 12/18/2024 1302   Hypertension requiring the use of medication  0 filed at 12/18/2024 1302   Peripheral vascular disease 0 filed at 12/18/2024 1302   Impaired sensorium (cognitive impairement or loss, clouding, or delirium) 0 filed at 12/18/2024 1302   TIA or CVA withouy residual deficit 0 filed at 12/18/2024 1302   Cerebrovascular accident with deficit 0 filed at 12/18/2024 1302   Modified Frailty Index Calculator 0 filed at 12/18/2024 1302          CHADS2 Stroke Risk  Current as of about an hour ago        N/A 3 to 100%: High Risk   2 to < 3%: Medium Risk   0 to < 2%: Low Risk     Last Change: N/A          This score determines the patient's risk of having a stroke if the patient has atrial fibrillation.        This score is not applicable to this patient.  Components are not calculated.          Revised Cardiac Risk Index      Flowsheet Row Pre-Admission Testing from 12/18/2024 in [facility]   High-Risk Surgery (Intraperitoneal, Intrathoracic,Suprainguinal vascular) 0 filed at 12/18/2024 1302   History of ischemic heart disease (History of MI, History of positive exercuse test, Current chest paint considered due to myocardial ischemia, Use of nitrate therapy, ECG with pathological Q Waves) 0 filed at 12/18/2024 1302   History of congestive heart failure (pulmonary edemia, bilateral rales or S3 gallop, Paroxysmal nocturnal dyspnea, CXR showing pulmonary vascular redistribution) 0 filed at 12/18/2024 1302   History of cerebrovascular disease (Prior TIA or stroke) 0 filed at 12/18/2024 1302   Pre-operative insulin treatment 0 filed at 12/18/2024 1302   Pre-operative creatinine>2 mg/dl 0 filed at 12/18/2024 1302   Revised Cardiac Risk Calculator 0 filed at 12/18/2024 1302          Apfel Simplified Score      Flowsheet Row Pre-Admission Testing from 12/18/2024 in [facility]   Smoking status 1 filed at 12/18/2024 1302   History of motion sickness or PONV  0 filed at 12/18/2024 1302   Use of postoperative opioids 0 filed at 12/18/2024 1302   Gender - Female 1=Yes filed at 12/18/2024 1302   Apfel Simplified Score Calculator 2 filed at 12/18/2024 1302          Risk Analysis Index Results This Encounter    No data found in the last 10 encounters.       Stop Bang Score      Flowsheet Row Pre-Admission Testing from 12/18/2024 in [facility]   Do you snore loudly? 1 filed at 12/18/2024 1236   Do you often feel tired or fatigued after your sleep? 1 filed at 12/18/2024 1236   Has anyone ever observed you stop breathing in your sleep? 1 filed at 12/18/2024 1236   Do you have or are you being treated for high blood pressure? 0 filed at 12/18/2024 1236   Recent BMI (Calculated) 37.9 filed at 12/18/2024 1236   Is BMI greater than 35  kg/m2? 1=Yes filed at 12/18/2024 1236   Age older than 50 years old? 1=Yes filed at 12/18/2024 1236   Is your neck circumference greater than 17 inches (Male) or 16 inches (Female)? 0 filed at 12/18/2024 1236   Gender - Male 0=No filed at 12/18/2024 1236   STOP-BANG Total Score 5 filed at 12/18/2024 1236          Prodigy: High Risk  Total Score: 13              Prodigy Age Score      Prodigy SDB Score          ARISCAT Score for Postoperative Pulmonary Complications      Flowsheet Row Pre-Admission Testing from 12/18/2024 in [facility]   Age, years  3 filed at 12/18/2024 1302   Preoperative SpO2 0 filed at 12/18/2024 1302   Respiratory infection in the last month Either upper or lower (i.e., URI, bronchitis, pneumonia), with fever and antibiotic treatment 0 filed at 12/18/2024 1302   Preoperative anemoa (Hgb less than 10 g/dl) 0 filed at 12/18/2024 1302   Surgical incision  0 filed at 12/18/2024 1302   Duration of surgery  16 filed at 12/18/2024 1302   Emergency Procedure  0 filed at 12/18/2024 1302   ARISCAT Total Score  19 filed at 12/18/2024 1302          El Perioperative Risk for Myocardial Infarction or Cardiac Arrest (LIZZIE)      Flowsheet Row Pre-Admission Testing from 12/18/2024 in [facility]   Age 1.3 filed at 12/18/2024 1303   Functional Status  0 filed at 12/18/2024 1303   ASA Class  -3.29 filed at 12/18/2024 1303   Creatinine 0 filed at 12/18/2024 1303   Type of Procedure  0.80 filed at 12/18/2024 1303   LIZZIE Total Score  -6.44 filed at 12/18/2024 1303   LIZZIE % 0.16 filed at 12/18/2024 1303            Assessment & Plan    Neuro:    No diagnosis or significant findings on chart review or clinical presentation and evaluation.    The patient is at an increased risk for post operative delirium secondary to age >/= 65.  Preoperative brain exercise educational handout provided to patient.    The patient is at an increased risk for perioperative stroke secondary to increased  age, HLD, and female sex .    HEENT/Airway:   -SHITAL - compliant with CPAP; Recommend prioritizing non-opioid analgesic techniques (regional and local anesthesia, nonsteroidal medications, etc) before the administration of opioids and close monitoring for hypoventilation after surgery due to suspected SHITAL. If intravenous narcotics are needed beyond the immediate raheel-operative period, the patient may benefit from continuous pulse oximetry to monitor for hypoxic events till baseline Sp02 is normal on room air and a respiratory therapy evaluation.   STOP-BANG Score- 5 points high risk for SHITAL    Mallampati::  II    TM distance::  >3 FB    Neck ROM::  Full  Dentures-denies  Crowns-reports  Implants-reports multiple upper and lower    Cardiovascular:    - Elevated BP today due to pain; /90- discussed ED precautions with patient, no blurry vision, no other concerning sx  - HLD- on atorvastatin (continue)  No additional preoperative testing is currently indicated.  METS: 5.1, activity limited due to hip  RCRI: 0 points, 3.9%    30 day risk of MACE (risk for cardiac death, nonfatal myocardial infarction, and nonfactal cardiac arrest  LIZZIE: 0.16   % risk of intraoperative or 30-day postoperative MACE  EKG -reviewed from 11/25/24, normal simus Rate 82, scanned into media      Pulmonary:     No diagnosis or significant findings on chart review or clinical presentation and evaluation.   ARISCAT: <26 points, 1.6% risk of in-hospital postoperative pulmonary complication  PRODIGY: Moderate risk for opioid induced respiratory depression  Smoking History-She has never smoked.  Preoperative deep breathing educational handout provided to patient.    Renal:  No diagnosis or significant findings on chart review or clinical presentation and evaluation, however, the patient is at increased risk of perioperative renal complications secondary to age>/= 56. Preventative measures include BP monitoring, medication compliance, and  hydration management.   CMP-Pending    Endocrine:    -Graves/Hyperthyroidism- started on methimazole; last TSH 11/18/24 <.01  - follows with endocrinology - denies any symptoms at this time    Hematologic:    No diagnosis or significant findings on chart review or clinical presentation and evaluation.  The patient is not a Jehovah’s witness and will accept blood and blood products if medically indicated.   History of previous blood transfusions No  CBC today unremarkable    Caprini Score 12, patient at High for postoperative DVT. Pt supplied education/VTE handout  Anticoagulation use: Yes - asa 81mg for prevention  Preoperative DVT educational handout provided to patient.    Gastrointestinal:     -GERD - not on any meds, only occasional  Recreational drug use: Drug use No  Alcohol use none    Apfel: 1 points 21% risk for post operative N/V    Infectious disease:    No diagnosis or significant findings on chart review or clinical presentation and evaluation.   Prescription provided for CHG body wash and dental rinse. CHG use instructions reviewed and provided to patient. Patient advised to call [facility] if rx not received.   Staph screen collected-Pending    Musculoskeletal:    -left hip osteoarthritis- scheduled for surgery    Gyn:  -Endometrial/uterine cancer - s/p hysterectomy and radiation 11/2022, follows with gynonc      Anesthesia:  ASA 2 - Patient with mild systemic disease with no functional limitations  History of General anesthesia- yes  Complications- No anesthesia complications  No family history of anesthesia complications    Abnormalities noted on PAT evaluation: No    Nickel/metal allergy-negative  Shellfish allergy-negative    Discussed with patient medication instructions, NPO guidelines, and any questions or concerns. Patient aware she will need clearance from endocrinology prior to surgery      Sammie De Los Santos PA-C 12/18/2024 1:12 PM    Addendum 01/03/25  Received clearance from Dr. Munson with  endocrinology, clear for surgery from thyroid standpoint. Scanned into media

## 2024-12-20 LAB — STAPHYLOCOCCUS SPEC CULT: NORMAL

## 2025-01-02 ENCOUNTER — LAB (OUTPATIENT)
Dept: LAB | Facility: LAB | Age: 65
End: 2025-01-02
Payer: MEDICARE

## 2025-01-02 DIAGNOSIS — E05.90 HYPERTHYROIDISM: ICD-10-CM

## 2025-01-02 DIAGNOSIS — E05.90 HYPERTHYROIDISM: Primary | ICD-10-CM

## 2025-01-02 LAB
T3FREE SERPL-MCNC: 3.3 PG/ML (ref 2.3–4.2)
T4 FREE SERPL-MCNC: 0.55 NG/DL (ref 0.61–1.12)
TSH SERPL-ACNC: 0.03 MIU/L (ref 0.44–3.98)

## 2025-01-02 PROCEDURE — 84481 FREE ASSAY (FT-3): CPT

## 2025-01-02 RX ORDER — METHIMAZOLE 5 MG/1
5 TABLET ORAL DAILY
Qty: 30 TABLET | Refills: 4 | Status: SHIPPED | OUTPATIENT
Start: 2025-01-02 | End: 2026-01-02

## 2025-01-06 ENCOUNTER — TELEPHONE (OUTPATIENT)
Dept: ORTHOPEDIC SURGERY | Facility: HOSPITAL | Age: 65
End: 2025-01-06
Payer: MEDICARE

## 2025-01-06 NOTE — TELEPHONE ENCOUNTER
Thank you for taking my call today.  All questions were answered at the time of the call, but please feel free to reach out to me via MyChart or phone, [phone], with any new questions or concerns.       We confirmed that you opted to enroll in our [id number] program so your discharge prescriptions will be available to take home at the time of discharge.  Please bring any prescription insurance coverage with you on the morning of surgery so that we can enter the information into our system.     We confirmed that your plan would be to Stay Overnight.    We confirmed that you have DME needed for recovery.     Use the provided body wash for 4 days before surgery and complete a 5th shower on the morning of surgery, this includes your body and hair.  Follow the directions as provided during preadmission testing.  The mouth wash will be used the night before and the morning of surgery.       As a reminder, if you do not hear from our team, please call [phone] between 2pm and 3pm the business day before your surgery to confirm your arrival time and details.        Please don't hesitate to reach out with additional questions or concerns.    Sandi Salmeron MBA, BSN, RN-BC  [facility]  [facility]  [phone]

## 2025-01-09 RX ORDER — NAPROXEN SODIUM 220 MG/1
81 TABLET, FILM COATED ORAL 2 TIMES DAILY
Qty: 60 TABLET | Refills: 0 | Status: SHIPPED | OUTPATIENT
Start: 2025-01-09 | End: 2025-02-09

## 2025-01-09 RX ORDER — SENNOSIDES 8.6 MG/1
1 TABLET ORAL DAILY
Qty: 15 TABLET | Refills: 0 | Status: SHIPPED | OUTPATIENT
Start: 2025-01-09 | End: 2025-01-25

## 2025-01-09 RX ORDER — ONDANSETRON 4 MG/1
4 TABLET, FILM COATED ORAL EVERY 8 HOURS PRN
Qty: 20 TABLET | Refills: 0 | Status: SHIPPED | OUTPATIENT
Start: 2025-01-09

## 2025-01-09 RX ORDER — TRAMADOL HYDROCHLORIDE 50 MG/1
50-100 TABLET ORAL EVERY 6 HOURS PRN
Qty: 40 TABLET | Refills: 0 | Status: SHIPPED | OUTPATIENT
Start: 2025-01-09 | End: 2025-01-17

## 2025-01-09 RX ORDER — DOCUSATE SODIUM 100 MG/1
100 CAPSULE, LIQUID FILLED ORAL 2 TIMES DAILY
Qty: 30 CAPSULE | Refills: 0 | Status: SHIPPED | OUTPATIENT
Start: 2025-01-09 | End: 2025-01-25

## 2025-01-09 RX ORDER — PANTOPRAZOLE SODIUM 40 MG/1
40 TABLET, DELAYED RELEASE ORAL
Qty: 30 TABLET | Refills: 0 | Status: SHIPPED | OUTPATIENT
Start: 2025-01-09 | End: 2025-02-09

## 2025-01-09 RX ORDER — OXYCODONE HYDROCHLORIDE 5 MG/1
5-10 TABLET ORAL EVERY 6 HOURS PRN
Qty: 40 TABLET | Refills: 0 | Status: SHIPPED | OUTPATIENT
Start: 2025-01-09 | End: 2025-01-17

## 2025-01-09 RX ORDER — ACETAMINOPHEN 500 MG
1000 TABLET ORAL EVERY 8 HOURS
Qty: 60 TABLET | Refills: 1 | Status: SHIPPED | OUTPATIENT
Start: 2025-01-09 | End: 2025-01-30

## 2025-01-09 RX ORDER — CEFADROXIL 500 MG/1
500 CAPSULE ORAL 2 TIMES DAILY
Qty: 10 CAPSULE | Refills: 0 | Status: SHIPPED | OUTPATIENT
Start: 2025-01-09 | End: 2025-01-15

## 2025-01-09 NOTE — DISCHARGE INSTRUCTIONS
Rob Razo MD MS  [address] [facility]   [address]  [phone] (office)  [phone] (fax)    PLEASE READ CAREFULLY BEFORE CONTACTING YOUR PROVIDER.    WE WORK COLLABORATIVELY AS A TEAM. CALLING MULTIPLE STAFF MEMBERS REGARDING THE SAME ISSUE WILL DELAY YOUR CARE.  [employer] IS THE PREFERRED COMMUNICATION FOR ALL TEAM MEMBERS.    Postoperative Instructions: TOTAL HIP ARTHROPLASTY    JOINT CARE TEAM  Please use the information below to contact your care team following surgery.  If you are leaving a message, please include your full name, date of birth and date of surgery so that we can correctly identify you.  Your call will be returned within 1-2 business days, please do not leave multiple messages regarding a single issue while you are awaiting a return call.     Who to call Contact Information Matters needing handled   Rob Razo MD [facility] [employer] [facility]  [phone] Prescription Refills   Orders for dental antibiotics lifelong     Sandi Salmeron MBA, BSN, RN-BC  HUI Valles, RN  Ortho Program Navigators - HUI Corona, RN  Ortho Coordinator Yue BERRIOSN-BC  Ortho Nurse Navigator   [phone] Nursing, medical question related questions or concerns within 6 weeks of surgery   Orders for Outpatient Physical Therapy                    [phone]     Scheduling office Visits  Medical questions/concerns  Leave of Absence or other paperwork  Any concerns more than 6 weeks from surgery - an appointment will need to be made     MEDICATION REFILLS - (MyChart or Main Office)    You will not receive a call indicating that your prescription has been filled.  Please contact your pharmacy with any questions.    Medication refills will be filled Monday-Friday 7am to 1pm ONLY. Please call the office or send a [employer] message for a refill request.  Any requests received outside of this timeframe will be handled on the next  business day.  The office staff and orthopedic nurses cannot refill medications; messages should be left directly through the office or via my chart.  Please do not call multiple times or call other members of the care team for medication needs, this will cause the refill to take longer.    Per State and Institutional policy, pain medications can only be refilled every 7 days for up to six weeks following surgery.    DRIVING & TRAVEL AFTER SURGERY   Patients should anticipate waiting at least 4-6 weeks before traveling long distances after surgery.  You will need to stop to walk around ever 1 hour during your travel to help with blood clot prevention.  Please call the office or your joint nurse to discuss prior to post-surgical travel.  Patients may not drive until cleared by the joint nurse or the office.    DENTAL PROCEDURES & CLEANINGS  You must wait a minimum of 3 months for elective dental appointments, including routine cleanings or dental work including bridges, crowns, extractions, etc..  For any dental visit - cleaning or dental procedures - patients must take an antibiotic 1 hour before the appointment.  Antibiotics are a lifelong need before dental appointments.  The antibiotic prescribed will be based on each patient's allergies.    WOUND CARE  Pain and swelling are normal following surgery and can last for weeks to months depending on the patient.  To help relieve these symptoms, please follow the post-operative pain regimen as it has been prescribed, use ice often, wear compression stockings every day as prescribed, and elevate your leg every hour.   Leaving the hospital, you have an ACE wrap in place. Two days after surgery, you can remove the ACE wrap and discard it. It does NOT need to be reapplied again.  You have a waterproof bandage on your wound and may shower with this on. The waterproof bandage is to remain in place for a 7 days. You or your home therapist should remove it at this time. You  may leave your incision open to air after the bandage has been removed.  You may shower 48 hours after surgery.  Do not scrub directly over or near the edges of your surgical bandage.  Soap and water may run freely over the site.  Under your waterproof bandage you have Steri-strips or a mesh covering in place. DO NOT peel them off. They will fall off on their own. You can continue to shower with these on. Let the water run freely over your incision when showering and do not scrub the incision or the surrounding area.   When drying the area around the incision, please use a SEPARATE towel that was not used on the rest of your body. The towel should be recently laundered but does not have to be cleaned a specific way. It should be laundered every 3 days. Pat the area dry. Do not rub the area around the incision. Steri strips will fall off in 1-2 weeks. If after 2 weeks they, have not, gently peel them off.  You may have clear stitches at the ends of your wound. Place Band-Aids on them for the first few weeks to prevent rubbing. You can gently tug on them after 2 weeks and they will come out or fall out on their own. DO NOT cut them. If they have not fallen out by you post-op visit, your doctor will remove them  If you have black stitches in place, your doctor will remove them in 2 weeks. These CANNOT get wet. If you have the silver waterproof bandage in place, you can shower. If the waterproof bandage is removed or not sticking, you CANNOT shower.  DO NOT soak your incision in a bath, hot tub, pool or pond/lake for a minimum of 12 weeks following your surgery.  DO NOT use lotions, creams, ointments on your wound for a minimum of 6 weeks following your surgery. At that time you may use vitamin E to assist with softening of your incision. Your physical therapist or doctor will talk to you about scar massage which can be started at 6 weeks.   You do NOT need to use the soap that was provided to you prior to surgery after  surgery. Use regular soap or shampoo.     PAIN, SWELLING, BRUISING & CLICKING  Pain and swelling are a natural part of your recovery which is considered normal fur up to a year after surgery.  Symptoms may be treated with movement, ice, compression stockings, elevating your leg, and by following the pain medication regimen as prescribed.  Bruising is normal for several weeks after surgery and will run down the leg over time.  You may ice areas that are tender to help with discomfort.  You are required to wear the provided compression stockings, every day, for 4 weeks following surgery.  Remove the stockings at night and place them back on in the morning.  Pain and swelling may temporarily increase with an increase in activity or exercise.  Use ice after activity.  Audible clicking with movement or exercises is considered normal following joint replacement.  You may also feel decreased sensation or numbness near the incision site.  These are usually normal and may or may not fade over time.     PERSONAL HYGIENE  You may shower upon discharging from the hospital.  Soap and water is permitted to run over the surgical dressing, steri-strips and incision.  Do not scrub directly over these items.  DO NOT soak your incision in a bath, hot tub, pool or pond/lake for a minimum of 12 weeks following your surgery.  DO NOT use lotions, creams, ointments on your wound for a minimum of 6 weeks following your surgery. At that time you may use vitamin E to assist with softening of your incision.      RESTARTING HOME ROUTINE - DIET & MEDICATIONS  Post-operative constipation can result due to a combination of inactivity, anesthesia and pain medication. To help prevent this, you should increase your water and fiber intake. Physical activity such as walking will also help stimulate the bowels.   You may resume your normal diet when you discharge home.  Choose foods that help promote good bowel habits and prevent constipation, such as  foods high in fiber.  You may restart your home medications the following day after your surgery UNLESS you have been given alternate instructions.  Follow the instructions given to you on your hospital discharge instructions for more information regarding your home medications.      IN-HOME PHYSICAL THERAPY & OUTPATIENT PHYSICAL THERAPY  Remember to get up and walk around your home every hour to 90 minutes to prevent blood clots and help with your recovery. This is an ACTIVE recovery.  In-home physical therapy will start 1-2 days after you get home from the hospital.    The home care agency will call within the first 24-48 hours to set up their first visit.  Please do not call your care team to inquire during this timeframe.  Continue the exercises you were given in the hospital until you have been seen by in-home therapy.  Make sure to provide a phone number with the ability for the home care staff to leave a message if you do not answer your phone.    Outpatient physical therapy following hip replacement surgery should begin 2-3 weeks after surgery if you and your physical therapist feel that you need it.  You should call to schedule this appointment ASAP if not already scheduled before surgery.  Waiting until you are ready for outpatient physical therapy will cause a delay in your care.  You may choose any outpatient physical therapy location.  Call the office for an order if needed.    EMERGENCIES - WHEN TO CONTACT THE SURGEON'S OFFICE IMMEDIATELY  Fever >101 with chills that has been present for at least 48 hours.   Excessive bleeding from incision that will not slow down. A small amount of drainage is normal and expected.  Once pressure is applied and the area is covered, do not continue to check the area regularly.  This will remove pressure and bleeding will continue.  Leave in place for 4-6 hours.  Signs of infection of incision-excessive drainage that is soaking through your dressing (especially if it is  pus-like), redness that is spreading out from the edges of your incision, or increased warmth around the area.  Excruciating pain for which the pain medication, taken as instructed, is not helping.  Severe calf pain.  Go directly to the emergency room or call 911, if you are experiencing chest pain or difficulty breathing.    ICE & COLD THERAPY INSTRUCTIONS    To assist with pain control and post-op swelling, you should be using ice regularly throughout recovery, especially for the first 6 weeks, regardless of the cold therapy method you use.      Always make sure there is a layer of protection between the cold pad and your skin.    If you are using ICE PACKS or GEL PACKS, you will need to alternate 20 minutes on, 20 minutes off twice per hour.    If you are using an ICE MACHINE, please follow the provided ice machine instructions.  These devices differ from ice or ice packs whereas the mechanism circulates water through tubing and a pad to provide longer periods of cold therapy to the desired site.  You can use your cold devices around the clock for optimal comfort.  We recommend using cold therapy after working with therapy or completing exercises on your own.  There is no set schedule in which you must follow while using cold therapy.  Below are a few points to remember when using a cold therapy device:    You do not need to need to use the 20 on, 20 off method.  Detach the pad from the cooler and ambulate at least once every hour.  You can check your skin under the pad at this time.  You may wear the cold therapy device during periods of sleep including overnight.  If you wake up during the night, you can check the skin at this time.  You do not need to wake up specifically to perform skin checks.  Empty the cooler and pad when device is not in use.  Follow 's instructions for cleaning your cold therapy device.      DISCHARGE MEDICATIONS - Please reference the sample schedule for instructions on how  to best schedule medications.  PAIN MEDICATION    ___X_ Tramadol / Oxycodone  Tramadol and Oxycodone have been prescribed for post-operative pain control.    These medications will only be refilled ONCE every 7 days for a period of up to 6 weeks following surgery.  After 6 weeks, you will transition to acetaminophen and over -the- counter anti-inflammatories such as Ibuprofen, Advil or Aleve in conjunction with ICE/COLD THERAPY.   Side effects may be constipation and nausea, vomiting, sleepiness, dizziness, lightheadedness, headache, blurred vision, dry mouth sweating, itching (if you have itching, over-the -counter Benadryl can be used as needed).  You may NOT operate a motor vehicle while taking these medications or have been cleared by your care team.     ___X_ Acetaminophen (Tylenol)  Acetaminophen has been prescribed as an adjunct for pain control. Take two 500 mg tablets every 8 hours for 4 weeks. You will not receive a refill on this medication.  Do not exceed 4000mg of acetaminophen within a 24 hour period.  Side effects may include nausea, heartburn, drowsiness, and headache.    _______ Meloxicam (Mobic)-Meloxicam has been prescribed as an adjunct anti-inflammatory to assist in pain control.    Take one 15mg tablet once daily for 4 weeks.  You will not receive refills on this medication.   Side effects may include nausea.  May not be prescribed if you are on a more potent blood thinner than aspirin or have chronic kidney disease.    BLOOD THINNER    ___X_ Blood Thinner   Aspirin, eliquis or xarelto has been prescribed as a blood thinner to prevent blood clots in your leg or lungs. Take as prescribed on the bottle for 4 weeks. You will not receive a refill on this medication.  Do not take this medication if you are on another blood thinner.  Do not take any additional anti-inflammatory medications while taking Aspirin or another blood thinning medication.  Examples may include, Celebrex, Ibuprofen, Motrin,  Aleve, or Advil.     ANTI NAUSEA    ___X_ Pantoprazole (Protonix)  Pantoprazole has been prescribed to help with nausea and protect your stomach while taking pain medication. Take one 40 mg tablet once daily for 4 weeks. You will not receive a refill on this medication.    ___X_ Zofran (Ondansetron)  Zofran has been prescribed to help with nausea and protect your stomach while taking pain medication. Take one 4 mg tablet every eight hours as needed for nausea. Refills will be provided as necessary.      STOOL SOFTENERS    ___X_ Colace (Docusate Sodium) and Senna (Sennoside)  Post-operative constipation can result due to a combination of inactivity, anesthesia and pain medication. To help prevent this, you should increase your water and fiber intake. Physical activity such as walking will also help stimulate the bowels.   Colace has been prescribed to help with constipation while on Oxycodone and Tramadol. Take one 100 mg tablet twice daily for 4 weeks. No refills needed.  Senna has been prescribed in combination with Colace to help with constipation while taking your pain medications.  Take one 8.6mg Tablet once daily.      ANTIBIOTICS    ___X_ Cefadroxil or Doxycycline   Post-operative prevention of infection   Side effects can be upset stomach or diarrhea  Take with food. Do not take on an empty stomach  May not be prescribed     You will not receive refills on the following medications:  Acetaminophen (Tylenol)  Senna  Colace  Pantoprazole  Blood Thinner (Aspirin or Eliquis)  Antibiotic (Cefadroxil or Doxycycline)  Pain Medication Refills -[phone] or [facility]- Monday through Friday 7am-1pm    Medication refills will be sent upon receipt of your request during the times listed above. Due to the high call volume, you will not receive a call confirming prescription refills; please do not call multiple times.  Prescription refills may take a few hours to process, you may follow up with your pharmacy for pickup  availability.    SAMPLE              The times below are an example of how to organize medications to optimize pain control  Your actual medication schedule may vary based on your last dose taken IN THE HOSPITAL      Time 3:00am 6:00am 9:00am 12:00pm 3:00pm 6:00pm 9:00pm 12:00am   Medications Tramadol Acetaminophen (Tylenol)   Oxycodone  Senna   Blood Thinner  Colace  Pantoprazole  Tramadol  Antibiotic  Oxycodone Tramadol  Acetaminophen (Tylenol) Oxycodone     Blood Thinner  Colace  Tramadol  Antibiotic   Acetaminophen (Tylenol)   Oxycodone            You may begin to wean off the pain medication as your pain remains controlled with increased activity.  The schedules provided are meant to serve as an example.  You may wean off based on your pain control.  Please note that pain medications are not filled beyond 6 weeks after surgery.              The times below are an example of how to WEAN OFF medications WHILE CONTINUING TO OPTIMIZE PAIN CONTROL.  Your actual medication schedule may vary based on your last dose taken.  Time 12:00am 4:00am 8:00am 12:00pm 4:00pm 8:00pm   Med Tramadol Oxycodone   Tramadol Oxycodone Tramadol Oxycodone     Time 12:00am 6:00am 12:00pm 6:00pm   Med Tramadol Oxycodone   Tramadol Oxycodone     Time 12:00am 8:00am 4:00pm   Med Tramadol Oxycodone   Tramadol     Time 12:00am 12:00pm   Med Tramadol Tramadol

## 2025-01-10 ENCOUNTER — APPOINTMENT (OUTPATIENT)
Dept: RADIOLOGY | Facility: HOSPITAL | Age: 65
End: 2025-01-10
Payer: MEDICARE

## 2025-01-10 ENCOUNTER — HOSPITAL ENCOUNTER (OUTPATIENT)
Facility: HOSPITAL | Age: 65
Discharge: HOME HEALTH | End: 2025-01-11
Attending: STUDENT IN AN ORGANIZED HEALTH CARE EDUCATION/TRAINING PROGRAM | Admitting: STUDENT IN AN ORGANIZED HEALTH CARE EDUCATION/TRAINING PROGRAM
Payer: MEDICARE

## 2025-01-10 ENCOUNTER — PHARMACY VISIT (OUTPATIENT)
Dept: PHARMACY | Facility: CLINIC | Age: 65
End: 2025-01-10
Payer: COMMERCIAL

## 2025-01-10 ENCOUNTER — ANESTHESIA (OUTPATIENT)
Dept: OPERATING ROOM | Facility: HOSPITAL | Age: 65
End: 2025-01-10
Payer: MEDICARE

## 2025-01-10 ENCOUNTER — ANESTHESIA EVENT (OUTPATIENT)
Dept: OPERATING ROOM | Facility: HOSPITAL | Age: 65
End: 2025-01-10
Payer: MEDICARE

## 2025-01-10 ENCOUNTER — HOME HEALTH ADMISSION (OUTPATIENT)
Dept: HOME HEALTH SERVICES | Facility: HOME HEALTH | Age: 65
End: 2025-01-10
Payer: MEDICARE

## 2025-01-10 DIAGNOSIS — M16.12 PRIMARY OSTEOARTHRITIS OF LEFT HIP: ICD-10-CM

## 2025-01-10 DIAGNOSIS — Z96.642 S/P TOTAL LEFT HIP ARTHROPLASTY: Primary | ICD-10-CM

## 2025-01-10 DIAGNOSIS — M16.12 OSTEOARTHRITIS OF LEFT HIP, UNSPECIFIED OSTEOARTHRITIS TYPE: ICD-10-CM

## 2025-01-10 PROCEDURE — 2720000007 HC OR 272 NO HCPCS: Performed by: STUDENT IN AN ORGANIZED HEALTH CARE EDUCATION/TRAINING PROGRAM

## 2025-01-10 PROCEDURE — 2500000005 HC RX 250 GENERAL PHARMACY W/O HCPCS: Performed by: NURSE ANESTHETIST, CERTIFIED REGISTERED

## 2025-01-10 PROCEDURE — 7100000011 HC EXTENDED STAY RECOVERY HOURLY - NURSING UNIT

## 2025-01-10 PROCEDURE — 97530 THERAPEUTIC ACTIVITIES: CPT | Mod: GP

## 2025-01-10 PROCEDURE — 97161 PT EVAL LOW COMPLEX 20 MIN: CPT | Mod: GP

## 2025-01-10 PROCEDURE — 3600000018 HC OR TIME - INITIAL BASE CHARGE - PROCEDURE LEVEL SIX: Performed by: STUDENT IN AN ORGANIZED HEALTH CARE EDUCATION/TRAINING PROGRAM

## 2025-01-10 PROCEDURE — C1776 JOINT DEVICE (IMPLANTABLE): HCPCS | Performed by: STUDENT IN AN ORGANIZED HEALTH CARE EDUCATION/TRAINING PROGRAM

## 2025-01-10 PROCEDURE — 94660 CPAP INITIATION&MGMT: CPT

## 2025-01-10 PROCEDURE — 2500000004 HC RX 250 GENERAL PHARMACY W/ HCPCS (ALT 636 FOR OP/ED): Performed by: NURSE ANESTHETIST, CERTIFIED REGISTERED

## 2025-01-10 PROCEDURE — 2780000003 HC OR 278 NO HCPCS: Performed by: STUDENT IN AN ORGANIZED HEALTH CARE EDUCATION/TRAINING PROGRAM

## 2025-01-10 PROCEDURE — RXMED WILLOW AMBULATORY MEDICATION CHARGE

## 2025-01-10 PROCEDURE — 2500000001 HC RX 250 WO HCPCS SELF ADMINISTERED DRUGS (ALT 637 FOR MEDICARE OP): Performed by: STUDENT IN AN ORGANIZED HEALTH CARE EDUCATION/TRAINING PROGRAM

## 2025-01-10 PROCEDURE — 2500000001 HC RX 250 WO HCPCS SELF ADMINISTERED DRUGS (ALT 637 FOR MEDICARE OP)

## 2025-01-10 PROCEDURE — 72170 X-RAY EXAM OF PELVIS: CPT

## 2025-01-10 PROCEDURE — 3700000002 HC GENERAL ANESTHESIA TIME - EACH INCREMENTAL 1 MINUTE: Performed by: STUDENT IN AN ORGANIZED HEALTH CARE EDUCATION/TRAINING PROGRAM

## 2025-01-10 PROCEDURE — 27130 TOTAL HIP ARTHROPLASTY: CPT | Performed by: STUDENT IN AN ORGANIZED HEALTH CARE EDUCATION/TRAINING PROGRAM

## 2025-01-10 PROCEDURE — 2500000004 HC RX 250 GENERAL PHARMACY W/ HCPCS (ALT 636 FOR OP/ED): Mod: JZ | Performed by: STUDENT IN AN ORGANIZED HEALTH CARE EDUCATION/TRAINING PROGRAM

## 2025-01-10 PROCEDURE — 3600000017 HC OR TIME - EACH INCREMENTAL 1 MINUTE - PROCEDURE LEVEL SIX: Performed by: STUDENT IN AN ORGANIZED HEALTH CARE EDUCATION/TRAINING PROGRAM

## 2025-01-10 PROCEDURE — 7100000001 HC RECOVERY ROOM TIME - INITIAL BASE CHARGE: Performed by: STUDENT IN AN ORGANIZED HEALTH CARE EDUCATION/TRAINING PROGRAM

## 2025-01-10 PROCEDURE — 2500000004 HC RX 250 GENERAL PHARMACY W/ HCPCS (ALT 636 FOR OP/ED): Performed by: STUDENT IN AN ORGANIZED HEALTH CARE EDUCATION/TRAINING PROGRAM

## 2025-01-10 PROCEDURE — 7100000002 HC RECOVERY ROOM TIME - EACH INCREMENTAL 1 MINUTE: Performed by: STUDENT IN AN ORGANIZED HEALTH CARE EDUCATION/TRAINING PROGRAM

## 2025-01-10 PROCEDURE — 3700000001 HC GENERAL ANESTHESIA TIME - INITIAL BASE CHARGE: Performed by: STUDENT IN AN ORGANIZED HEALTH CARE EDUCATION/TRAINING PROGRAM

## 2025-01-10 PROCEDURE — 97607 NEG PRS WND THR NDME<=50SQCM: CPT | Performed by: STUDENT IN AN ORGANIZED HEALTH CARE EDUCATION/TRAINING PROGRAM

## 2025-01-10 PROCEDURE — 2500000004 HC RX 250 GENERAL PHARMACY W/ HCPCS (ALT 636 FOR OP/ED)

## 2025-01-10 PROCEDURE — 97110 THERAPEUTIC EXERCISES: CPT | Mod: GP

## 2025-01-10 PROCEDURE — 99222 1ST HOSP IP/OBS MODERATE 55: CPT | Performed by: EMERGENCY MEDICINE

## 2025-01-10 PROCEDURE — 2500000005 HC RX 250 GENERAL PHARMACY W/O HCPCS: Performed by: STUDENT IN AN ORGANIZED HEALTH CARE EDUCATION/TRAINING PROGRAM

## 2025-01-10 PROCEDURE — C1713 ANCHOR/SCREW BN/BN,TIS/BN: HCPCS | Performed by: STUDENT IN AN ORGANIZED HEALTH CARE EDUCATION/TRAINING PROGRAM

## 2025-01-10 PROCEDURE — A4649 SURGICAL SUPPLIES: HCPCS | Performed by: STUDENT IN AN ORGANIZED HEALTH CARE EDUCATION/TRAINING PROGRAM

## 2025-01-10 DEVICE — PINNACLE CANCELLOUS BONE SCREW 6.5MM X 25MM
Type: IMPLANTABLE DEVICE | Site: ACETABULUM | Status: FUNCTIONAL
Brand: PINNACLE

## 2025-01-10 DEVICE — BIOLOX DELTA CERAMIC FEMORAL HEAD +1.5 36MM DIA 12/14 TAPER
Type: IMPLANTABLE DEVICE | Site: FEMUR | Status: FUNCTIONAL
Brand: BIOLOX DELTA

## 2025-01-10 DEVICE — PINNACLE CANCELLOUS BONE SCREW 6.5MM X 15MM
Type: IMPLANTABLE DEVICE | Site: ACETABULUM | Status: FUNCTIONAL
Brand: PINNACLE

## 2025-01-10 DEVICE — PINNACLE CANCELLOUS BONE SCREW 6.5MM X 20MM
Type: IMPLANTABLE DEVICE | Site: ACETABULUM | Status: FUNCTIONAL
Brand: PINNACLE

## 2025-01-10 DEVICE — ACTIS DUOFIX HIP PROSTHESIS (FEMORAL STEM 12/14 TAPER CEMENTLESS SIZE 8 STD COLLAR)  CE
Type: IMPLANTABLE DEVICE | Site: FEMUR | Status: FUNCTIONAL
Brand: ACTIS

## 2025-01-10 RX ORDER — PHENYLEPHRINE HCL IN 0.9% NACL 1 MG/10 ML
SYRINGE (ML) INTRAVENOUS AS NEEDED
Status: DISCONTINUED | OUTPATIENT
Start: 2025-01-10 | End: 2025-01-10

## 2025-01-10 RX ORDER — FENTANYL CITRATE 50 UG/ML
INJECTION, SOLUTION INTRAMUSCULAR; INTRAVENOUS AS NEEDED
Status: DISCONTINUED | OUTPATIENT
Start: 2025-01-10 | End: 2025-01-10

## 2025-01-10 RX ORDER — ONDANSETRON 4 MG/1
4 TABLET, ORALLY DISINTEGRATING ORAL EVERY 8 HOURS PRN
Status: DISCONTINUED | OUTPATIENT
Start: 2025-01-10 | End: 2025-01-11 | Stop reason: HOSPADM

## 2025-01-10 RX ORDER — OXYCODONE HYDROCHLORIDE 5 MG/1
5 TABLET ORAL EVERY 6 HOURS PRN
Status: DISCONTINUED | OUTPATIENT
Start: 2025-01-10 | End: 2025-01-10

## 2025-01-10 RX ORDER — PANTOPRAZOLE SODIUM 40 MG/1
40 TABLET, DELAYED RELEASE ORAL
Status: DISCONTINUED | OUTPATIENT
Start: 2025-01-11 | End: 2025-01-11 | Stop reason: HOSPADM

## 2025-01-10 RX ORDER — KETOROLAC TROMETHAMINE 15 MG/ML
15 INJECTION, SOLUTION INTRAMUSCULAR; INTRAVENOUS EVERY 6 HOURS
Status: COMPLETED | OUTPATIENT
Start: 2025-01-10 | End: 2025-01-11

## 2025-01-10 RX ORDER — HYDROMORPHONE HYDROCHLORIDE 0.2 MG/ML
0.2 INJECTION INTRAMUSCULAR; INTRAVENOUS; SUBCUTANEOUS EVERY 4 HOURS PRN
Status: DISCONTINUED | OUTPATIENT
Start: 2025-01-10 | End: 2025-01-11 | Stop reason: HOSPADM

## 2025-01-10 RX ORDER — CELECOXIB 200 MG/1
200 CAPSULE ORAL ONCE
Status: COMPLETED | OUTPATIENT
Start: 2025-01-10 | End: 2025-01-10

## 2025-01-10 RX ORDER — OXYCODONE HYDROCHLORIDE 5 MG/1
10 TABLET ORAL EVERY 4 HOURS PRN
Status: DISCONTINUED | OUTPATIENT
Start: 2025-01-10 | End: 2025-01-11 | Stop reason: HOSPADM

## 2025-01-10 RX ORDER — ASPIRIN 81 MG/1
81 TABLET ORAL 2 TIMES DAILY
Status: DISCONTINUED | OUTPATIENT
Start: 2025-01-11 | End: 2025-01-11 | Stop reason: HOSPADM

## 2025-01-10 RX ORDER — NALOXONE HYDROCHLORIDE 0.4 MG/ML
0.2 INJECTION, SOLUTION INTRAMUSCULAR; INTRAVENOUS; SUBCUTANEOUS EVERY 5 MIN PRN
Status: DISCONTINUED | OUTPATIENT
Start: 2025-01-10 | End: 2025-01-11 | Stop reason: HOSPADM

## 2025-01-10 RX ORDER — OXYCODONE HYDROCHLORIDE 5 MG/1
5 TABLET ORAL EVERY 4 HOURS PRN
Status: DISCONTINUED | OUTPATIENT
Start: 2025-01-10 | End: 2025-01-11 | Stop reason: HOSPADM

## 2025-01-10 RX ORDER — DOCUSATE SODIUM 100 MG/1
100 CAPSULE, LIQUID FILLED ORAL 2 TIMES DAILY
Status: DISCONTINUED | OUTPATIENT
Start: 2025-01-10 | End: 2025-01-11 | Stop reason: HOSPADM

## 2025-01-10 RX ORDER — SIMETHICONE 80 MG
80 TABLET,CHEWABLE ORAL 4 TIMES DAILY PRN
Status: DISCONTINUED | OUTPATIENT
Start: 2025-01-10 | End: 2025-01-11 | Stop reason: HOSPADM

## 2025-01-10 RX ORDER — BUPIVACAINE HYDROCHLORIDE 7.5 MG/ML
INJECTION, SOLUTION INTRASPINAL AS NEEDED
Status: DISCONTINUED | OUTPATIENT
Start: 2025-01-10 | End: 2025-01-10

## 2025-01-10 RX ORDER — ACETAMINOPHEN 325 MG/1
650 TABLET ORAL ONCE
Status: COMPLETED | OUTPATIENT
Start: 2025-01-10 | End: 2025-01-10

## 2025-01-10 RX ORDER — PROPOFOL 10 MG/ML
INJECTION, EMULSION INTRAVENOUS CONTINUOUS PRN
Status: DISCONTINUED | OUTPATIENT
Start: 2025-01-10 | End: 2025-01-10

## 2025-01-10 RX ORDER — ATORVASTATIN CALCIUM 20 MG/1
20 TABLET, FILM COATED ORAL NIGHTLY
Status: DISCONTINUED | OUTPATIENT
Start: 2025-01-10 | End: 2025-01-11 | Stop reason: HOSPADM

## 2025-01-10 RX ORDER — POLYETHYLENE GLYCOL 3350 17 G/17G
17 POWDER, FOR SOLUTION ORAL DAILY
Status: DISCONTINUED | OUTPATIENT
Start: 2025-01-11 | End: 2025-01-11 | Stop reason: HOSPADM

## 2025-01-10 RX ORDER — TRANEXAMIC ACID 100 MG/ML
INJECTION, SOLUTION INTRAVENOUS AS NEEDED
Status: DISCONTINUED | OUTPATIENT
Start: 2025-01-10 | End: 2025-01-10

## 2025-01-10 RX ORDER — METHIMAZOLE 5 MG/1
5 TABLET ORAL DAILY
Status: DISCONTINUED | OUTPATIENT
Start: 2025-01-11 | End: 2025-01-11 | Stop reason: HOSPADM

## 2025-01-10 RX ORDER — ACETAMINOPHEN 325 MG/1
975 TABLET ORAL EVERY 8 HOURS
Status: DISCONTINUED | OUTPATIENT
Start: 2025-01-10 | End: 2025-01-11 | Stop reason: HOSPADM

## 2025-01-10 RX ORDER — FERROUS SULFATE 325(65) MG
65 TABLET ORAL
Status: DISCONTINUED | OUTPATIENT
Start: 2025-01-10 | End: 2025-01-11 | Stop reason: HOSPADM

## 2025-01-10 RX ORDER — CEFAZOLIN SODIUM 2 G/100ML
2 INJECTION, SOLUTION INTRAVENOUS ONCE
Status: COMPLETED | OUTPATIENT
Start: 2025-01-10 | End: 2025-01-10

## 2025-01-10 RX ORDER — ONDANSETRON HYDROCHLORIDE 2 MG/ML
4 INJECTION, SOLUTION INTRAVENOUS EVERY 8 HOURS PRN
Status: DISCONTINUED | OUTPATIENT
Start: 2025-01-10 | End: 2025-01-11 | Stop reason: HOSPADM

## 2025-01-10 RX ORDER — KETOROLAC TROMETHAMINE 30 MG/ML
INJECTION, SOLUTION INTRAMUSCULAR; INTRAVENOUS AS NEEDED
Status: DISCONTINUED | OUTPATIENT
Start: 2025-01-10 | End: 2025-01-10

## 2025-01-10 RX ORDER — GLYCOPYRROLATE 0.2 MG/ML
INJECTION INTRAMUSCULAR; INTRAVENOUS AS NEEDED
Status: DISCONTINUED | OUTPATIENT
Start: 2025-01-10 | End: 2025-01-10

## 2025-01-10 RX ORDER — CEFAZOLIN SODIUM 2 G/100ML
2 INJECTION, SOLUTION INTRAVENOUS EVERY 8 HOURS
Status: DISCONTINUED | OUTPATIENT
Start: 2025-01-10 | End: 2025-01-11 | Stop reason: HOSPADM

## 2025-01-10 RX ORDER — BISACODYL 5 MG
10 TABLET, DELAYED RELEASE (ENTERIC COATED) ORAL DAILY PRN
Status: DISCONTINUED | OUTPATIENT
Start: 2025-01-10 | End: 2025-01-11 | Stop reason: HOSPADM

## 2025-01-10 RX ORDER — ROPIVACAINE/EPI/CLONIDINE/KET 2.46-0.005
SYRINGE (ML) INJECTION AS NEEDED
Status: DISCONTINUED | OUTPATIENT
Start: 2025-01-10 | End: 2025-01-10 | Stop reason: HOSPADM

## 2025-01-10 RX ORDER — VANCOMYCIN HYDROCHLORIDE 1 G/20ML
INJECTION, POWDER, LYOPHILIZED, FOR SOLUTION INTRAVENOUS AS NEEDED
Status: DISCONTINUED | OUTPATIENT
Start: 2025-01-10 | End: 2025-01-10 | Stop reason: HOSPADM

## 2025-01-10 RX ORDER — SODIUM CHLORIDE, SODIUM LACTATE, POTASSIUM CHLORIDE, CALCIUM CHLORIDE 600; 310; 30; 20 MG/100ML; MG/100ML; MG/100ML; MG/100ML
100 INJECTION, SOLUTION INTRAVENOUS CONTINUOUS
Status: ACTIVE | OUTPATIENT
Start: 2025-01-10 | End: 2025-01-11

## 2025-01-10 RX ORDER — CALCIUM CARBONATE 200(500)MG
500 TABLET,CHEWABLE ORAL 4 TIMES DAILY PRN
Status: DISCONTINUED | OUTPATIENT
Start: 2025-01-10 | End: 2025-01-11 | Stop reason: HOSPADM

## 2025-01-10 RX ORDER — TALC
3 POWDER (GRAM) TOPICAL NIGHTLY PRN
Status: DISCONTINUED | OUTPATIENT
Start: 2025-01-10 | End: 2025-01-11 | Stop reason: HOSPADM

## 2025-01-10 RX ORDER — SCOPOLAMINE 1 MG/3D
1 PATCH, EXTENDED RELEASE TRANSDERMAL ONCE
Status: DISCONTINUED | OUTPATIENT
Start: 2025-01-10 | End: 2025-01-11 | Stop reason: HOSPADM

## 2025-01-10 RX ADMIN — Medication 100 MCG: at 09:41

## 2025-01-10 RX ADMIN — ACETAMINOPHEN 975 MG: 325 TABLET, FILM COATED ORAL at 23:00

## 2025-01-10 RX ADMIN — CEFAZOLIN SODIUM 2 G: 2 INJECTION, SOLUTION INTRAVENOUS at 09:10

## 2025-01-10 RX ADMIN — KETOROLAC TROMETHAMINE 15 MG: 15 INJECTION, SOLUTION INTRAMUSCULAR; INTRAVENOUS at 16:10

## 2025-01-10 RX ADMIN — Medication 150 MCG: at 09:35

## 2025-01-10 RX ADMIN — FENTANYL CITRATE 25 MCG: 0.05 INJECTION, SOLUTION INTRAMUSCULAR; INTRAVENOUS at 10:32

## 2025-01-10 RX ADMIN — BUPIVACAINE HYDROCHLORIDE IN DEXTROSE 1.6 ML: 7.5 INJECTION, SOLUTION SUBARACHNOID at 09:10

## 2025-01-10 RX ADMIN — DEXAMETHASONE SODIUM PHOSPHATE 4 MG: 4 INJECTION, SOLUTION INTRAMUSCULAR; INTRAVENOUS at 11:12

## 2025-01-10 RX ADMIN — CEFAZOLIN SODIUM 2 G: 2 INJECTION, SOLUTION INTRAVENOUS at 16:11

## 2025-01-10 RX ADMIN — OXYCODONE 5 MG: 5 TABLET ORAL at 13:50

## 2025-01-10 RX ADMIN — Medication 150 MCG: at 09:29

## 2025-01-10 RX ADMIN — ATORVASTATIN CALCIUM 20 MG: 20 TABLET, FILM COATED ORAL at 21:08

## 2025-01-10 RX ADMIN — DOCUSATE SODIUM 100 MG: 100 CAPSULE, LIQUID FILLED ORAL at 21:08

## 2025-01-10 RX ADMIN — SCOPOLAMINE 1 PATCH: 1.5 PATCH, EXTENDED RELEASE TRANSDERMAL at 14:07

## 2025-01-10 RX ADMIN — SODIUM CHLORIDE, POTASSIUM CHLORIDE, SODIUM LACTATE AND CALCIUM CHLORIDE: 600; 310; 30; 20 INJECTION, SOLUTION INTRAVENOUS at 08:53

## 2025-01-10 RX ADMIN — OXYCODONE 5 MG: 5 TABLET ORAL at 19:35

## 2025-01-10 RX ADMIN — FENTANYL CITRATE 25 MCG: 0.05 INJECTION, SOLUTION INTRAMUSCULAR; INTRAVENOUS at 10:48

## 2025-01-10 RX ADMIN — PROPOFOL 100 MCG/KG/MIN: 10 INJECTION, EMULSION INTRAVENOUS at 09:20

## 2025-01-10 RX ADMIN — GLYCOPYRROLATE 0.2 MG: 0.2 INJECTION INTRAMUSCULAR; INTRAVENOUS at 09:51

## 2025-01-10 RX ADMIN — FERROUS SULFATE TAB 325 MG (65 MG ELEMENTAL FE) 325 MG: 325 (65 FE) TAB at 16:11

## 2025-01-10 RX ADMIN — POVIDONE-IODINE 1 APPLICATION: 5 SOLUTION TOPICAL at 07:10

## 2025-01-10 RX ADMIN — Medication 100 MCG: at 10:20

## 2025-01-10 RX ADMIN — KETOROLAC TROMETHAMINE 15 MG: 15 INJECTION, SOLUTION INTRAMUSCULAR; INTRAVENOUS at 23:00

## 2025-01-10 RX ADMIN — Medication 100 MCG: at 10:08

## 2025-01-10 RX ADMIN — Medication 200 MCG: at 09:47

## 2025-01-10 RX ADMIN — ACETAMINOPHEN 650 MG: 325 TABLET, FILM COATED ORAL at 07:10

## 2025-01-10 RX ADMIN — GLYCOPYRROLATE 0.2 MG: 0.2 INJECTION INTRAMUSCULAR; INTRAVENOUS at 10:05

## 2025-01-10 RX ADMIN — Medication 100 MCG: at 09:59

## 2025-01-10 RX ADMIN — CELECOXIB 200 MG: 200 CAPSULE ORAL at 07:10

## 2025-01-10 RX ADMIN — Medication 100 MCG: at 09:56

## 2025-01-10 RX ADMIN — KETOROLAC TROMETHAMINE 15 MG: 30 INJECTION, SOLUTION INTRAMUSCULAR at 11:12

## 2025-01-10 RX ADMIN — ACETAMINOPHEN 975 MG: 325 TABLET, FILM COATED ORAL at 16:10

## 2025-01-10 RX ADMIN — TRANEXAMIC ACID 1000 MG: 100 INJECTION INTRAVENOUS at 09:30

## 2025-01-10 SDOH — SOCIAL STABILITY: SOCIAL INSECURITY: WITHIN THE LAST YEAR, HAVE YOU BEEN HUMILIATED OR EMOTIONALLY ABUSED IN OTHER WAYS BY YOUR PARTNER OR EX-PARTNER?: NO

## 2025-01-10 SDOH — ECONOMIC STABILITY: FOOD INSECURITY: WITHIN THE PAST 12 MONTHS, THE FOOD YOU BOUGHT JUST DIDN'T LAST AND YOU DIDN'T HAVE MONEY TO GET MORE.: NEVER TRUE

## 2025-01-10 SDOH — SOCIAL STABILITY: SOCIAL INSECURITY: HAS ANYONE EVER THREATENED TO HURT YOUR FAMILY OR YOUR PETS?: NO

## 2025-01-10 SDOH — HEALTH STABILITY: MENTAL HEALTH: CURRENT SMOKER: 0

## 2025-01-10 SDOH — SOCIAL STABILITY: SOCIAL INSECURITY: ARE YOU OR HAVE YOU BEEN THREATENED OR ABUSED PHYSICALLY, EMOTIONALLY, OR SEXUALLY BY ANYONE?: NO

## 2025-01-10 SDOH — SOCIAL STABILITY: SOCIAL INSECURITY: HAVE YOU HAD THOUGHTS OF HARMING ANYONE ELSE?: NO

## 2025-01-10 SDOH — ECONOMIC STABILITY: FOOD INSECURITY: WITHIN THE PAST 12 MONTHS, YOU WORRIED THAT YOUR FOOD WOULD RUN OUT BEFORE YOU GOT THE MONEY TO BUY MORE.: NEVER TRUE

## 2025-01-10 SDOH — ECONOMIC STABILITY: INCOME INSECURITY: IN THE PAST 12 MONTHS HAS THE ELECTRIC, GAS, OIL, OR WATER COMPANY THREATENED TO SHUT OFF SERVICES IN YOUR HOME?: NO

## 2025-01-10 SDOH — SOCIAL STABILITY: SOCIAL INSECURITY
WITHIN THE LAST YEAR, HAVE YOU BEEN KICKED, HIT, SLAPPED, OR OTHERWISE PHYSICALLY HURT BY YOUR PARTNER OR EX-PARTNER?: NO

## 2025-01-10 SDOH — SOCIAL STABILITY: SOCIAL INSECURITY: ABUSE: ADULT

## 2025-01-10 SDOH — SOCIAL STABILITY: SOCIAL INSECURITY: DOES ANYONE TRY TO KEEP YOU FROM HAVING/CONTACTING OTHER FRIENDS OR DOING THINGS OUTSIDE YOUR HOME?: NO

## 2025-01-10 SDOH — SOCIAL STABILITY: SOCIAL INSECURITY
WITHIN THE LAST YEAR, HAVE YOU BEEN RAPED OR FORCED TO HAVE ANY KIND OF SEXUAL ACTIVITY BY YOUR PARTNER OR EX-PARTNER?: NO

## 2025-01-10 SDOH — SOCIAL STABILITY: SOCIAL INSECURITY: WITHIN THE LAST YEAR, HAVE YOU BEEN AFRAID OF YOUR PARTNER OR EX-PARTNER?: NO

## 2025-01-10 SDOH — SOCIAL STABILITY: SOCIAL INSECURITY: ARE THERE ANY APPARENT SIGNS OF INJURIES/BEHAVIORS THAT COULD BE RELATED TO ABUSE/NEGLECT?: NO

## 2025-01-10 SDOH — SOCIAL STABILITY: SOCIAL INSECURITY: DO YOU FEEL UNSAFE GOING BACK TO THE PLACE WHERE YOU ARE LIVING?: NO

## 2025-01-10 SDOH — SOCIAL STABILITY: SOCIAL INSECURITY: DO YOU FEEL ANYONE HAS EXPLOITED OR TAKEN ADVANTAGE OF YOU FINANCIALLY OR OF YOUR PERSONAL PROPERTY?: NO

## 2025-01-10 SDOH — SOCIAL STABILITY: SOCIAL INSECURITY: HAVE YOU HAD ANY THOUGHTS OF HARMING ANYONE ELSE?: NO

## 2025-01-10 SDOH — SOCIAL STABILITY: SOCIAL INSECURITY: WERE YOU ABLE TO COMPLETE ALL THE BEHAVIORAL HEALTH SCREENINGS?: YES

## 2025-01-10 ASSESSMENT — PAIN SCALES - GENERAL
PAINLEVEL_OUTOF10: 0 - NO PAIN
PAINLEVEL_OUTOF10: 5 - MODERATE PAIN
PAINLEVEL_OUTOF10: 3
PAINLEVEL_OUTOF10: 0 - NO PAIN
PAINLEVEL_OUTOF10: 3
PAINLEVEL_OUTOF10: 3
PAINLEVEL_OUTOF10: 0 - NO PAIN
PAINLEVEL_OUTOF10: 6
PAINLEVEL_OUTOF10: 2
PAINLEVEL_OUTOF10: 6
PAIN_LEVEL: 1
PAINLEVEL_OUTOF10: 0 - NO PAIN
PAINLEVEL_OUTOF10: 5 - MODERATE PAIN
PAINLEVEL_OUTOF10: 0 - NO PAIN

## 2025-01-10 ASSESSMENT — COGNITIVE AND FUNCTIONAL STATUS - GENERAL
CLIMB 3 TO 5 STEPS WITH RAILING: A LITTLE
DRESSING REGULAR LOWER BODY CLOTHING: A LITTLE
STANDING UP FROM CHAIR USING ARMS: A LITTLE
MOVING FROM LYING ON BACK TO SITTING ON SIDE OF FLAT BED WITH BEDRAILS: A LITTLE
PATIENT BASELINE BEDBOUND: NO
MOVING TO AND FROM BED TO CHAIR: A LITTLE
WALKING IN HOSPITAL ROOM: A LITTLE
CLIMB 3 TO 5 STEPS WITH RAILING: A LITTLE
STANDING UP FROM CHAIR USING ARMS: A LITTLE
TURNING FROM BACK TO SIDE WHILE IN FLAT BAD: A LITTLE
TURNING FROM BACK TO SIDE WHILE IN FLAT BAD: A LITTLE
DRESSING REGULAR LOWER BODY CLOTHING: A LITTLE
MOBILITY SCORE: 19
MOVING TO AND FROM BED TO CHAIR: A LITTLE
HELP NEEDED FOR BATHING: A LITTLE
TURNING FROM BACK TO SIDE WHILE IN FLAT BAD: A LITTLE
TOILETING: A LITTLE
DAILY ACTIVITIY SCORE: 21
WALKING IN HOSPITAL ROOM: A LITTLE
TOILETING: A LITTLE
HELP NEEDED FOR BATHING: A LITTLE
MOBILITY SCORE: 18
CLIMB 3 TO 5 STEPS WITH RAILING: A LITTLE
STANDING UP FROM CHAIR USING ARMS: A LITTLE
WALKING IN HOSPITAL ROOM: A LITTLE
MOBILITY SCORE: 19
DAILY ACTIVITIY SCORE: 21
MOVING TO AND FROM BED TO CHAIR: A LITTLE

## 2025-01-10 ASSESSMENT — ACTIVITIES OF DAILY LIVING (ADL)
HEARING - LEFT EAR: FUNCTIONAL
TOILETING: INDEPENDENT
ADEQUATE_TO_COMPLETE_ADL: YES
LACK_OF_TRANSPORTATION: NO
PATIENT'S MEMORY ADEQUATE TO SAFELY COMPLETE DAILY ACTIVITIES?: YES
ADLS_ADDRESSED: YES
BATHING: INDEPENDENT
ASSISTIVE_DEVICE: WALKER
DRESSING YOURSELF: INDEPENDENT
HEARING - RIGHT EAR: FUNCTIONAL
GROOMING: INDEPENDENT
ADL_ASSISTANCE: INDEPENDENT
FEEDING YOURSELF: INDEPENDENT
JUDGMENT_ADEQUATE_SAFELY_COMPLETE_DAILY_ACTIVITIES: YES
LACK_OF_TRANSPORTATION: NO
WALKS IN HOME: INDEPENDENT

## 2025-01-10 ASSESSMENT — PAIN - FUNCTIONAL ASSESSMENT
PAIN_FUNCTIONAL_ASSESSMENT: 0-10

## 2025-01-10 ASSESSMENT — PATIENT HEALTH QUESTIONNAIRE - PHQ9
1. LITTLE INTEREST OR PLEASURE IN DOING THINGS: NOT AT ALL
SUM OF ALL RESPONSES TO PHQ9 QUESTIONS 1 & 2: 0
2. FEELING DOWN, DEPRESSED OR HOPELESS: NOT AT ALL

## 2025-01-10 ASSESSMENT — PAIN DESCRIPTION - LOCATION
LOCATION: HIP

## 2025-01-10 ASSESSMENT — PAIN DESCRIPTION - ORIENTATION
ORIENTATION: LEFT
ORIENTATION: LEFT

## 2025-01-10 ASSESSMENT — LIFESTYLE VARIABLES
HOW MANY STANDARD DRINKS CONTAINING ALCOHOL DO YOU HAVE ON A TYPICAL DAY: PATIENT DOES NOT DRINK
HOW OFTEN DO YOU HAVE A DRINK CONTAINING ALCOHOL: NEVER
AUDIT-C TOTAL SCORE: 0
AUDIT-C TOTAL SCORE: 0
SKIP TO QUESTIONS 9-10: 1
HOW OFTEN DO YOU HAVE 6 OR MORE DRINKS ON ONE OCCASION: NEVER

## 2025-01-10 ASSESSMENT — PAIN DESCRIPTION - DESCRIPTORS
DESCRIPTORS: ACHING

## 2025-01-10 ASSESSMENT — COLUMBIA-SUICIDE SEVERITY RATING SCALE - C-SSRS
2. HAVE YOU ACTUALLY HAD ANY THOUGHTS OF KILLING YOURSELF?: NO
6. HAVE YOU EVER DONE ANYTHING, STARTED TO DO ANYTHING, OR PREPARED TO DO ANYTHING TO END YOUR LIFE?: NO
1. IN THE PAST MONTH, HAVE YOU WISHED YOU WERE DEAD OR WISHED YOU COULD GO TO SLEEP AND NOT WAKE UP?: NO

## 2025-01-10 NOTE — ANESTHESIA PREPROCEDURE EVALUATION
"Patient: Kelli Brito \"Cecelia\"    Procedure Information       Date/Time: 01/10/25 0900    Procedure: LEFT TOTAL HIP ARTHROPLASTY ( Depuy Emphasys and Actis ) *Overnight* (Left: Hip)    Location: [facility] / [facility]    Surgeons: Rob Razo MD            Relevant Problems   Cardiac   (+) Hyperlipidemia   (+) Hypertension      Neuro   (+) Anxiety      GI   (+) GERD (gastroesophageal reflux disease)      Liver   (+) Cholelithiases      Endocrine   (+) Obesity, morbid (Multi)      Musculoskeletal   (+) Degeneration of lumbosacral intervertebral disc   (+) Displacement of lumbar intervertebral disc without myelopathy   (+) Primary osteoarthritis of left hip      HEENT   (+) Seasonal allergies   (+) Sinusitis      GYN   (+) Endometrial adenocarcinoma (Multi)   (+) Endometrial cancer determined by uterine biopsy (Multi)   (+) Uterine cancer (Multi)       Clinical information reviewed:   Tobacco  Allergies  Meds   Med Hx  Surg Hx   Fam Hx  Soc Hx      Vitals:    01/10/25 0659   BP: 152/83   Pulse: 74   Resp: 14   Temp: 36.3 °C (97.3 °F)   SpO2: 97%       Past Surgical History:   Procedure Laterality Date    HIP ARTHROPLASTY Right     HYSTERECTOMY      OTHER SURGICAL HISTORY  11/29/2022    Nose surgery    OTHER SURGICAL HISTORY  11/29/2022    Dilation and curettage    US GUIDED FINE PERCUTANEOUS ASPIRATION  12/08/2022    US GUIDED FINE PERCUTANEOUS ASPIRATION [facility]     Past Medical History:   Diagnosis Date    Anxiety disorder, unspecified 10/27/2015    Anxiety    Endometrial cancer (Multi)     GERD (gastroesophageal reflux disease)     Hypercholesteremia     Hyperlipidemia     Hypertension     Hyperthyroidism 09/24    Obesity     Other seasonal allergic rhinitis 01/20/2015    Seasonal allergies    Personal history of irradiation     Personal history of malignant neoplasm, unspecified     History of malignant neoplasm    Personal history of other diseases of the circulatory system     " History of hypertension    Personal history of other diseases of the digestive system     History of gastroesophageal reflux (GERD)    Personal history of other diseases of the musculoskeletal system and connective tissue     History of polymyalgia rheumatica    Personal history of other diseases of the nervous system and sense organs     History of sleep apnea    Personal history of urinary calculi     History of renal calculi    Sleep apnea     Thyroid nodule     Uterine cancer (Multi)        Current Facility-Administered Medications:     ceFAZolin (Ancef) 2 g in dextrose (iso)  mL, 2 g, intravenous, Once, Rob Razo MD  Prior to Admission medications    Medication Sig Start Date End Date Taking? Authorizing Provider   aspirin 81 mg EC tablet Take 1 tablet (81 mg) by mouth once daily.   Yes Historical Provider, MD   atorvastatin (Lipitor) 20 mg tablet TAKE 1 TABLET BY MOUTH EVERY DAY 11/17/24  Yes Jaxon Giraldo MD   calcium carbonate (CALTRATE 600 ORAL) Take 2 tablets by mouth once daily in the evening.   Yes Historical Provider, MD   chlorhexidine (Peridex) 0.12 % solution Swish and spit 15 mL night before surgery and morning of surgery 12/18/24  Yes Sammie De Los Santos PA-C   ibuprofen 200 mg tablet Take 3 tablets (600 mg) by mouth 2 times a day.   Yes Historical Provider, MD   methIMAzole (Tapazole) 5 mg tablet Take 1 tablet (5 mg) by mouth once daily. 1/2/25 1/2/26 Yes Daniel Munson MD   psyllium seed, with sugar, (METAMUCIL, SUGAR, ORAL) Take 3 tablets by mouth once daily as needed. gummies   Yes Historical Provider, MD   acetaminophen (Tylenol) 500 mg tablet Take 2 tablets (1,000 mg) by mouth every 8 hours for 20 days. 1/9/25 1/29/25  Rob Razo MD   aspirin 81 mg chewable tablet Chew 1 tablet (81 mg) 2 times a day. 1/9/25 2/8/25  Rob Razo MD   cefadroxil (Duricef) 500 mg capsule Take 1 capsule (500 mg) by mouth 2 times a day for 5 days. 1/9/25 1/14/25  Rob Razo  MD   docusate sodium (Colace) 100 mg capsule Take 1 capsule (100 mg) by mouth 2 times a day for 15 days. 1/9/25 1/24/25  Rob Razo MD   ondansetron (Zofran) 4 mg tablet Take 1 tablet (4 mg) by mouth every 8 hours if needed for nausea or vomiting. 1/9/25   Rob Razo MD   oxyCODONE (Roxicodone) 5 mg immediate release tablet Take 1-2 tablets (5-10 mg) by mouth every 6 hours if needed for severe pain (7 - 10) for up to 7 days. 1/9/25 1/16/25  Rob Razo MD   pantoprazole (ProtoNix) 40 mg EC tablet Take 1 tablet (40 mg) by mouth once daily in the morning. Take before meals. Do not crush, chew, or split. 1/9/25 2/8/25  Rob Razo MD   sennosides (Senokot) 8.6 mg tablet Take 1 tablet (8.6 mg) by mouth once daily for 15 days. 1/9/25 1/24/25  Rob Razo MD   traMADol (Ultram) 50 mg tablet Take 1-2 tablets ( mg) by mouth every 6 hours if needed for severe pain (7 - 10) for up to 7 days. 1/9/25 1/16/25  Rob Razo MD     Allergies   Allergen Reactions    Azithromycin Nausea Only and Unknown    Biotene Dry Mouth Hives    Grass Pollen Itching    Loratadine Unknown    Mold Other    Saliva Stimulant Comb. No.3 Hives     biotin    Gabapentin Anxiety and Other     Social History     Tobacco Use    Smoking status: Never    Smokeless tobacco: Never   Substance Use Topics    Alcohol use: Never         Chemistry    Lab Results   Component Value Date/Time     12/18/2024 1248    K 4.3 12/18/2024 1248     12/18/2024 1248    CO2 27 12/18/2024 1248    BUN 21 12/18/2024 1248    CREATININE 0.79 12/18/2024 1248    Lab Results   Component Value Date/Time    CALCIUM 10.3 12/18/2024 1248    ALKPHOS 111 12/18/2024 1248    AST 18 12/18/2024 1248    ALT 16 12/18/2024 1248    BILITOT 0.5 12/18/2024 1248          Lab Results   Component Value Date/Time    WBC 6.8 12/18/2024 1248    HGB 13.7 12/18/2024 1248    HCT 41.5 12/18/2024 1248     12/18/2024 1248     Lab Results    Component Value Date/Time    PROTIME 10.3 12/08/2022 1227    INR 1.0 12/08/2022 1227     Encounter Date: 11/25/24   ECG 12 lead (Clinic Performed)    Narrative    Normal EKG       NPO Detail:  NPO/Void Status  Date of Last Liquid: 01/10/25  Time of Last Liquid: 0530  Date of Last Solid: 01/09/25  Time of Last Solid: 2000  Time of Last Void: 0600         Physical Exam    Airway  Mallampati: III  TM distance: >3 FB  Neck ROM: full     Cardiovascular    Dental     Comments: Multiple crowns and implants   Pulmonary    Abdominal            Anesthesia Plan    History of general anesthesia?: yes  History of complications of general anesthesia?: no    ASA 3     spinal     The patient is not a current smoker.  Patient was not previously instructed to abstain from smoking on day of procedure.  Patient did not smoke on day of procedure.  Education provided regarding risk of obstructive sleep apnea.  intravenous induction   Anesthetic plan and risks discussed with patient.    Plan discussed with CRNA and CAA.

## 2025-01-10 NOTE — NURSING NOTE
Admission vital signs.  Pt. Oriented to the room.  Encouraged pt to order food.  Call light within reach.    Bed alarm on.  Scd's on.

## 2025-01-10 NOTE — CONSULTS
Consults    Reason For Consult  Medical management of history of anxiety hyperlipidemia hypertension hypothyroidism and postop pain    History Of Present Illness  Cecelia Brito is a 64 y.o. female presenting with left total hip replacement patient is hemodynamically stable upon arrival from PACU.     Past Medical History  She has a past medical history of Anxiety disorder, unspecified (10/27/2015), Endometrial cancer (Multi), GERD (gastroesophageal reflux disease), Hypercholesteremia, Hyperlipidemia, Hypertension, Hyperthyroidism (09/24), Obesity, Other seasonal allergic rhinitis (01/20/2015), Personal history of irradiation, Personal history of malignant neoplasm, unspecified, Personal history of other diseases of the circulatory system, Personal history of other diseases of the digestive system, Personal history of other diseases of the musculoskeletal system and connective tissue, Personal history of other diseases of the nervous system and sense organs, Personal history of urinary calculi, Sleep apnea, Thyroid nodule, and Uterine cancer (Multi).    She has no past medical history of Anemia, Asthma, Awareness under anesthesia, Cerebral vascular accident (Multi), CHF (congestive heart failure), Chronic kidney disease, COPD (chronic obstructive pulmonary disease) (Multi), Coronary artery disease, Delayed emergence from general anesthesia, Diabetes mellitus type I (Multi), Hard to intubate, History of blood transfusion, HL (hearing loss), Irregular heart beat, Malignant hyperthermia, Myocardial infarction (Multi), PONV (postoperative nausea and vomiting), Refusal of blood product, Seizure disorder (Multi), Stroke (Multi), TIA (transient ischemic attack), Type 2 diabetes mellitus, or Vision loss.    Surgical History  She has a past surgical history that includes Other surgical history (11/29/2022); Other surgical history (11/29/2022); US guided fine percutaneous aspiration (12/08/2022); Hysterectomy; and Hip  Arthroplasty (Right).     Social History  She reports that she has never smoked. She has never used smokeless tobacco. She reports that she does not drink alcohol and does not use drugs.    Family History  Family History   Problem Relation Name Age of Onset    Lung cancer Father Sudhakar     Cancer Father Sudhakar     Lung cancer Paternal Grandmother Hailey     Cancer Paternal Grandmother Hailey     Cancer Father's Sister Hattie         Allergies  Azithromycin, Biotene dry mouth, Grass pollen, Loratadine, Mold, Saliva stimulant comb. no.3, and Gabapentin    Review of Systems  10 system review noncontributory  Physical Exam  Patient is alert in no acute distress  Lungs are clear to auscultation and percussion  Cardiac is regular rate and rhythm normal S1-S2 without murmur gallop rub click S3 or S4  Abdomen is soft nontender positive bowel sounds there is no hepatosplenomegaly or CVA tenderness appreciated  Extremities without cyanosis clubbing erythema or edema  Operative site exam per Ortho  Last Recorded Vitals  /73 (BP Location: Right arm, Patient Position: Lying)   Pulse 69   Temp 36.3 °C (97.3 °F) (Temporal)   Resp 18   Wt 104 kg (230 lb 2.6 oz)   SpO2 100%     Relevant Results    Current Facility-Administered Medications:     acetaminophen (Tylenol) tablet 975 mg, 975 mg, oral, q8h, Doris Hannah MD    [START ON 1/11/2025] aspirin EC tablet 81 mg, 81 mg, oral, BID, Doris Hannah MD    atorvastatin (Lipitor) tablet 20 mg, 20 mg, oral, Nightly, Doris Hannah MD    benzocaine-menthol (Cepastat Sore Throat) lozenge 1 lozenge, 1 lozenge, Mouth/Throat, q4h PRN, Doris Hannah MD    bisacodyl (Dulcolax) EC tablet 10 mg, 10 mg, oral, Daily PRN, Doris Hannah MD    ceFAZolin (Ancef) 2 g in dextrose (iso)  mL, 2 g, intravenous, q8h, Doris Hannah MD    docusate sodium (Colace) capsule 100 mg, 100 mg, oral, BID, Doris Hannah MD    ferrous sulfate (325 mg ferrous sulfate) tablet 325 mg, 65 mg of iron, oral, BID,  Doris Hannah MD    HYDROmorphone PF (Dilaudid) injection 0.2 mg, 0.2 mg, intravenous, q4h PRN, Doris Hannah MD    ketorolac (Toradol) injection 15 mg, 15 mg, intravenous, q6h, Doris Hannah MD    lactated Ringer's infusion, 100 mL/hr, intravenous, Continuous, Doris Hannah MD, Stopped at 01/10/25 1424    [START ON 1/11/2025] methIMAzole (Tapazole) tablet 5 mg, 5 mg, oral, Daily, Doris Hannah MD    naloxone (Narcan) injection 0.2 mg, 0.2 mg, intravenous, q5 min PRN, Doris Hannah MD    ondansetron ODT (Zofran-ODT) disintegrating tablet 4 mg, 4 mg, oral, q8h PRN **OR** ondansetron (Zofran) injection 4 mg, 4 mg, intravenous, q8h PRN, Doris Hannah MD    oxyCODONE (Roxicodone) immediate release tablet 10 mg, 10 mg, oral, q4h PRN, Doris Hannah MD    oxyCODONE (Roxicodone) immediate release tablet 5 mg, 5 mg, oral, q6h PRN, Doris Hannah MD, 5 mg at 01/10/25 1350    oxygen (O2) therapy, , inhalation, Continuous PRN - O2/gases, Rob Razo MD    [START ON 1/11/2025] pantoprazole (ProtoNix) EC tablet 40 mg, 40 mg, oral, Daily before breakfast, Doris Hannah MD    [START ON 1/11/2025] polyethylene glycol (Glycolax, Miralax) packet 17 g, 17 g, oral, Daily, Doris Hannah MD    scopolamine (Transderm-Scop) patch 1 patch, 1 patch, transdermal, Once, Doris Hannah MD, 1 patch at 01/10/25 1407     No results found. However, due to the size of the patient record, not all encounters were searched. Please check Results Review for a complete set of results.       Assessment/Plan   Status post left total hip replacement  - Management per Ortho  - PT  - Pain control  - Supportive care    Hyperlipidemia  - Continue current medications    Hypertension  - Stable  - Continue current medications    Hyperthyroidism  - Continue current medications  - Continue to monitor    DVT prophylaxis  - Aspirin protocol  - SCDs  - Ambulate    Graciela Lindsey MD

## 2025-01-10 NOTE — PROGRESS NOTES
Physical Therapy Evaluation & Treatment    Patient Name: Cecelia Brito  MRN: [id number]  Department: [facility]  Room: [address] [place], [place] [address]  Today's Date: 1/10/2025   Time Calculation  Start Time: 1542  Stop Time: 1620  Time Calculation (min): 38 min    Assessment/Plan   PT Assessment  PT Assessment Results: Decreased strength, Decreased range of motion, Decreased endurance, Impaired balance, Decreased mobility  Rehab Prognosis: Good  Barriers to Discharge Home: No anticipated barriers  Evaluation/Treatment Tolerance: Patient tolerated treatment well  End of Session Communication: Bedside nurse  Assessment Comment: Pt presents with impaired functional mobility s/p L THR. Recommend discharge home with intermittent assistance and home health PT. Pt was issued HEP handout. Pt verbalized and demonstrated understanding.    End of Session Patient Position: Up in chair (RN present in room for med pass) BLE elevated, ice to surgical site, call light in reach, needs met, RN aware.   IP OR SWING BED PT PLAN  Inpatient or Swing Bed: Inpatient  PT Plan  Treatment/Interventions: Bed mobility, Transfer training, Gait training, Stair training, Balance training, Strengthening, Endurance training, Range of motion, Therapeutic exercise, Therapeutic activity, Home exercise program  PT Plan: Ongoing PT  PT Frequency: BID  PT Discharge Recommendations: Low intensity level of continued care  Equipment Recommended upon Discharge: Wheeled walker  PT Recommended Transfer Status: Stand by assist, Assistive device      Subjective     General Visit Information:  General  Reason for Referral: L posterior HANSEL (1/10/2025)  Referred By: Dr. Ballard  Past Medical History Relevant to Rehab: HTN, GERD, Graves disease, uterine CA, R HANSEL  Family/Caregiver Present: No  Prior to Session Communication: Bedside nurse  Patient Position Received: Bed, 3 rail up  General Comment: L posterior hip post-op dressing dry and intact.    Home Living:  Home Living  Type of Home:  House  Lives With: Spouse  Home Adaptive Equipment: Cane, Walker rolling or standard, Sock aid, Reacher  Home Layout: Two level, Able to live on main level with bedroom/bathroom  Home Access: Stairs to enter with rails  Entrance Stairs-Rails: Right  Entrance Stairs-Number of Steps: 3  Bathroom Shower/Tub: Walk-in shower  Bathroom Toilet: Standard  Bathroom Equipment: Grab bars in shower, Shower chair with back  Prior Level of Function:  Prior Function Per Pt/Caregiver Report  Level of [place]: Independent with ADLs and functional transfers, Independent with homemaking with ambulation  ADL Assistance: Independent  Homemaking Assistance: Independent  Ambulatory Assistance: Independent (reports usingg cane since 9/2024)  Vocational: Retired (nurse)  Prior Function Comments: pt reports fall 1x on 1/4/25 on steps, MARLEY  Precautions:  Precautions  LE Weight Bearing Status: Weight Bearing as Tolerated  Medical Precautions: Fall precautions  Post-Surgical Precautions: Left hip precautions  Precautions Comment: posterior hip precautions    Vital Signs (Past 2hrs)                Objective   Pain:  Pain Assessment  Pain Assessment: 0-10  0-10 (Numeric) Pain Score: 3  Pain Type: Surgical pain  Pain Location: Hip  Pain Orientation: Left  Pain Interventions: Cold applied, Repositioned, Elevated, Ambulation/increased activity  Cognition:  Cognition  Overall Cognitive Status: Within Functional Limits  Attention: Within Functional Limits  Memory: Within Funtional Limits  Problem Solving: Within Functional Limits  Numeric Reasoning: Within Functional Limits  Abstract Reasoning: Within Functional Limits  Safety/Judgement: Within Functional Limits  Insight: Within function limits  Impulsive: Within functional limits  Processing Speed: Within funtional limits    General Assessments:  Activity Tolerance  Endurance: Endurance does not limit participation in activity    Sensation  Light Touch: No apparent  deficits    Coordination  Movements are Fluid and Coordinated: Yes    Postural Control  Postural Control: Within Functional Limits    Static Sitting Balance  Static Sitting-Balance Support: Feet supported  Static Sitting-Level of Assistance: Independent  Dynamic Sitting Balance  Dynamic Sitting-Balance Support: Feet supported  Dynamic Sitting-Level of Assistance: Independent    Static Standing Balance  Static Standing-Balance Support: Bilateral upper extremity supported  Static Standing-Level of Assistance: Close supervision  Dynamic Standing Balance  Dynamic Standing-Balance Support: Bilateral upper extremity supported  Dynamic Standing-Level of Assistance: Contact guard  Functional Assessments:  ADL  ADL's Addressed: Yes  Toileting Assistance with Device: Stand by  Toileting Deficit: Supervison/safety, Verbal cueing    Bed Mobility  Bed Mobility: Yes  Bed Mobility 1  Bed Mobility 1: Supine to sitting  Level of Assistance 1: Minimum assistance  Bed Mobility Comments 1: assist to advance LLE towards EOB, good compliance w/hip precautions    Transfers  Transfer: Yes  Transfer 1  Transfer From 1: Bed to, Toilet to  Transfer to 1: Toilet, Chair with arms  Technique 1: Sit to stand, Stand to sit  Transfer Device 1: Walker  Transfer Level of Assistance 1: Minimum assistance, Contact guard  Trials/Comments 1: VC for hand placements, fair eccentric lowering,improved w/trial    Ambulation/Gait Training  Ambulation/Gait Training Performed: Yes  Ambulation/Gait Training 1  Surface 1: Level tile  Device 1: Rolling walker  Assistance 1: Contact guard  Quality of Gait 1: Diminished heel strike, Decreased step length, Antalgic  Comments/Distance (ft) 1: 10', 15'; pt ambulated to bathroom, back to recliner chair, step to gait pattern, decreased tesfaye, no overt LOB    Stairs  Stairs: No  Extremity/Trunk Assessments:  RUE   RUE : Within Functional Limits  LUE   LUE: Within Functional Limits  RLE   RLE : Within Functional  Limits  LLE   LLE : Exceptions to WFL hip ROM/strength limited due to post-op pain and surgeon restrictions.    Treatments:  Therapeutic Exercise  Therapeutic Exercise Performed: Yes B ankle pumps, L quad sets, L gluteal sets, L heel slides, L SAQ, and L hip abduction x 10 reps each.   Outcome Measures:  [facility] Basic Mobility  Turning from your back to your side while in a flat bed without using bedrails: A little  Moving from lying on your back to sitting on the side of a flat bed without using bedrails: A little  Moving to and from bed to chair (including a wheelchair): A little  Standing up from a chair using your arms (e.g. wheelchair or bedside chair): A little  To walk in hospital room: A little  Climbing 3-5 steps with railing: A little  Basic Mobility - Total Score: 18    Encounter Problems       Encounter Problems (Active)       Balance       LTG - Patient will demonstrate Intervention to enhance balance for safe completion of daily activities (Progressing)       Start:  01/10/25               Compromised Skin Integrity       LTG - Patient will be free from infection (Progressing)       Start:  01/10/25               Mobility       LTG - Patient will ambulate community distance w/Ritika and LRAD (Progressing)       Start:  01/10/25            LTG - Patient will navigate 3 steps with 1 rails and/or LRAD (Progressing)       Start:  01/10/25               PT Transfers       LTG - Patient will demonstrate safe transfer techniques w/Ritika and LRAD (Progressing)       Start:  01/10/25               Pain          Pain - Adult          Safety       LTG - Patient will adhere to hip precautions during ADL's and transfers       Start:  01/10/25            LTG - Patient will demonstrate safety requirements appropriate to situation/environment       Start:  01/10/25            LTG - Patient will utilize safety techniques       Start:  01/10/25            STG - Patient locks brakes on wheelchair       Start:  01/10/25             STG - Patient uses call light consistently to request assistance with transfers       Start:  01/10/25            STG - Patient uses gait belt during all transfers       Start:  01/10/25               Safety       LTG - Patient will adhere to hip precautions during ADL's and transfers (Progressing)       Start:  01/10/25                   Education Documentation  Handouts, taught by Treva Soliman PT at 1/10/2025  3:42 PM.  Learner: Patient  Readiness: Acceptance  Method: Explanation, Demonstration, Handout  Response: Verbalizes Understanding, Demonstrated Understanding, Needs Reinforcement    Precautions, taught by Treva Soliman PT at 1/10/2025  3:42 PM.  Learner: Patient  Readiness: Acceptance  Method: Explanation, Demonstration, Handout  Response: Verbalizes Understanding, Demonstrated Understanding, Needs Reinforcement    Body Mechanics, taught by Treva Soliman PT at 1/10/2025  3:42 PM.  Learner: Patient  Readiness: Acceptance  Method: Explanation, Demonstration, Handout  Response: Verbalizes Understanding, Demonstrated Understanding, Needs Reinforcement    Home Exercise Program, taught by Treva Soliman PT at 1/10/2025  3:42 PM.  Learner: Patient  Readiness: Acceptance  Method: Explanation, Demonstration, Handout  Response: Verbalizes Understanding, Demonstrated Understanding, Needs Reinforcement    Mobility Training, taught by Treva Soliman PT at 1/10/2025  3:42 PM.  Learner: Patient  Readiness: Acceptance  Method: Explanation, Demonstration, Handout  Response: Verbalizes Understanding, Demonstrated Understanding, Needs Reinforcement    Education Comments  No comments found.      Treva Soliman PT, DPT

## 2025-01-10 NOTE — CARE PLAN
Problem: Balance  Goal: LTG - Patient will demonstrate Intervention to enhance balance for safe completion of daily activities  Outcome: Progressing     Problem: Mobility  Goal: LTG - Patient will ambulate community distance w/Ritika and LRAD  Outcome: Progressing  Goal: LTG - Patient will navigate 3 steps with 1 rails and/or LRAD  Outcome: Progressing     Problem: Safety  Goal: LTG - Patient will adhere to hip precautions during ADL's and transfers  Outcome: Progressing     Problem: PT Transfers  Goal: LTG - Patient will demonstrate safe transfer techniques w/Ritika and LRAD  Outcome: Progressing     Problem: Pain  Goal: LTG - Patient will manage pain with the appropriate technique/Intervention  Outcome: Progressing     Problem: Compromised Skin Integrity  Goal: LTG - Patient will be free from infection  Outcome: Progressing

## 2025-01-10 NOTE — ANESTHESIA POSTPROCEDURE EVALUATION
"Patient: Kelli Brito \"Cecelia\"    Procedure Summary       Date: 01/10/25 Room / Location: [facility] / Virtual [facility]    Anesthesia Start: 0853 Anesthesia Stop: 1150    Procedure: LEFT TOTAL HIP ARTHROPLASTY ( Depuy Emphasys and Actis ) *Overnight* (Left: Hip) Diagnosis:       Primary osteoarthritis of left hip      (Primary osteoarthritis of left hip [M16.12])    Surgeons: Rob Razo MD Responsible Provider: Steve Delcid MD    Anesthesia Type: spinal ASA Status: 3            Anesthesia Type: spinal    Vitals Value Taken Time   /79 01/10/25 1245   Temp 35.4 °C (95.7 °F) 01/10/25 1147   Pulse 59 01/10/25 1245   Resp 14 01/10/25 1245   SpO2 97 % 01/10/25 1245       Anesthesia Post Evaluation    Patient location during evaluation: PACU  Patient participation: complete - patient participated  Level of consciousness: awake  Pain score: 1  Pain management: adequate  Multimodal analgesia pain management approach  Airway patency: patent  Two or more strategies used to mitigate risk of obstructive sleep apnea  Cardiovascular status: acceptable  Respiratory status: acceptable  Hydration status: acceptable  Postoperative Nausea and Vomiting: none        There were no known notable events for this encounter.    "

## 2025-01-10 NOTE — CARE PLAN
The patient's goals for the shift include pain management.    The clinical goals for the shift include pain management

## 2025-01-10 NOTE — BRIEF OP NOTE
"Date: 1/10/2025  OR Location: [facility]    Name: Kelli Brito \"Cecelia\", : 1960, Age: 64 y.o., MRN: [id number], Sex: female    Diagnosis  Pre-op Diagnosis      * Primary osteoarthritis of left hip [M16.12] Post-op Diagnosis     * Primary osteoarthritis of left hip [M16.12]     Procedures  LEFT TOTAL HIP ARTHROPLASTY ( Depuy Emphasys and Actis ) *Overnight*  49574 - [place] ARTHRP ACETBLR/PROX FEM PROSTC AGRFT/ALGRFT      Surgeons      * Rob Razo - Primary    Resident/Fellow/Other Assistant:  Surgeons and Role:  * No surgeons found with a matching role *    Staff:   Kevanulator: Rosalee  Scrub Person: Jaxon Pedroub Person: Silvia Escalera Scrub: Yaron    Anesthesia Staff: Anesthesiologist: Steve Delcid MD  CRNA: YAMILET Lacey-CRNA  SRNA: Edmundo Guillory    Procedure Summary  Anesthesia: Spinal  ASA: III  Estimated Blood Loss: 400 mL  Intra-op Medications:   Administrations occurring from 0900 to 1200 on 01/10/25:   Medication Name Total Dose   ropivacaine-epinephrine-clonidine-ketorolac 2.46-0.005- 0.0008-0.3mg/mL periarticular syringe 50 mL   vancomycin (Vancocin) vial for injection 1 g   bupivacaine PF 0.75 %-dextrose 8.25 % (Sensorcaine) intrathecal 1.6 mL   dexAMETHasone (Decadron) injection 4 mg/mL 4 mg   fentaNYL (Sublimaze) injection 50 mcg/mL 50 mcg   glycopyrrolate (Robinul) injection 0.4 mg   ketorolac (Toradol) injection 30 mg 15 mg   LR bolus Cannot be calculated   phenylephrine 100 mcg/mL syringe 10 mL (prefilled) 1,000 mcg   propofol (Diprivan) injection 10 mg/mL 1,401.05 mg   tranexamic acid (Cyklokapron) injection 1,000 mg   ceFAZolin (Ancef) 2 g in dextrose (iso)  mL 2 g              Anesthesia Record               Intraprocedure I/O Totals          Intake    LR bolus 900.00 mL    Tranexamic Acid 0.00 mL    The total shown is the total volume documented since Anesthesia Start was filed.    ceFAZolin (Ancef) 2 g in dextrose (iso)  mL 100.00 mL    Total Intake 1000 mL       " Output    Est. Blood Loss 400 mL    Total Output 400 mL       Net    Net Volume 600 mL          Specimen: No specimens collected     Findings: left hip osteoarthritis    Complications:  None; patient tolerated the procedure well.     Disposition: PACU - hemodynamically stable.  Condition: stable  Specimens Collected: No specimens collected  Attending Attestation: I was present and scrubbed for the entire procedure.    Rob Razo  Phone Number: [phone]

## 2025-01-10 NOTE — ANESTHESIA PROCEDURE NOTES
Spinal Block    Patient location during procedure: OR  Start time: 1/10/2025 9:00 AM  End time: 1/10/2025 9:10 AM  Reason for block: primary anesthetic  Staffing  Performed: SRNA and CRNA   Authorized by: Steve Delcid MD    Performed by: ROSANNE Lacey    Preanesthetic Checklist  Completed: patient identified, IV checked, risks and benefits discussed, surgical consent, monitors and equipment checked, pre-op evaluation, timeout performed and sterile techniques followed  Block Timeout  RN/Licensed healthcare professional reads aloud to the Anesthesia provider and entire team: Patient identity, procedure with side and site, patient position, and as applicable the availability of implants/special equipment/special requirements.  Patient on coagulant treatment: no  Timeout performed at: 1/10/2025 8:59 AM  Spinal Block  Patient position: sitting  Prep: ChloraPrep  Sterility prep: cap, drape, gloves, hand hygiene and mask  Sedation level: light sedation  Patient monitoring: blood pressure, continuous pulse oximetry and heart rate  Approach: midline  Vertebral space: L2-3  Injection technique: single-shot  Needle  Needle type: pencil-point   Needle gauge: 25 G  Needle length: 3.5 in  Free flowing CSF: yes    Assessment  Sensory level: T4  Block outcome: Allis test negative  Procedure assessment: patient tolerated procedure well with no immediate complications

## 2025-01-10 NOTE — PERIOPERATIVE NURSING NOTE
Patient arrived to PACU and received report at bedside.  Patient restless on arrival but able to reorient quickly

## 2025-01-10 NOTE — OP NOTE
"LEFT TOTAL HIP ARTHROPLASTY ( Depuy Emphasys and Actis ) *Overnight* (L) Operative Note     Date: 1/10/2025  OR Location: [facility]    Name: Kelli Brito \"Cecelia\", : 1960, Age: 64 y.o., MRN: [id number], Sex: female    Diagnosis  Pre-op Diagnosis      * Primary osteoarthritis of left hip [M16.12] Post-op Diagnosis     * Primary osteoarthritis of left hip [M16.12]     Procedures  LEFT TOTAL HIP ARTHROPLASTY ( Depuy Emphasys and Actis ) *Overnight*  69635 - TX ARTHRP ACETBLR/PROX FEM PROSTC AGRFT/ALGRFT  91240 - Application of incisional wound VAC    Surgeons      * Rob Razo - Primary    Resident/Fellow/Other Assistant:  Doris Hannah PGY2    Staff:   Kevanulator: Rosalee Cooper Person: Jaxon Pedroub Person: Silvia Escalera Scrub: Yaron    Anesthesia Staff: Anesthesiologist: Steve Delcid MD  CRNA: YAMILET Lacey-CRNA  SRNA: Edmundo Guillory    Procedure Summary  Anesthesia: Spinal  ASA: III  Estimated Blood Loss: 400 mL  Intra-op Medications:   Administrations occurring from 0900 to 1200 on 01/10/25:   Medication Name Total Dose   ropivacaine-epinephrine-clonidine-ketorolac 2.46-0.005- 0.0008-0.3mg/mL periarticular syringe 50 mL   vancomycin (Vancocin) vial for injection 1 g   ceFAZolin (Ancef) 2 g in dextrose (iso)  mL 2 g   bupivacaine PF 0.75 %-dextrose 8.25 % (Sensorcaine) intrathecal 1.6 mL   dexAMETHasone (Decadron) injection 4 mg/mL 4 mg   fentaNYL (Sublimaze) injection 50 mcg/mL 50 mcg   glycopyrrolate (Robinul) injection 0.4 mg   ketorolac (Toradol) injection 30 mg 15 mg   LR bolus Cannot be calculated   phenylephrine 100 mcg/mL syringe 10 mL (prefilled) 1,000 mcg   propofol (Diprivan) injection 10 mg/mL 1,401.05 mg   tranexamic acid (Cyklokapron) injection 1,000 mg              Anesthesia Record               Intraprocedure I/O Totals          Intake    LR bolus 1000.00 mL    Tranexamic Acid 0.00 mL    The total shown is the total volume documented since Anesthesia Start was filed.    " ceFAZolin (Ancef) 2 g in dextrose (iso)  mL 100.00 mL    Total Intake 1100 mL       Output    Est. Blood Loss 400 mL    Total Output 400 mL       Net    Net Volume 700 mL          Specimen: No specimens collected              Drains and/or Catheters: * None in log *    Tourniquet Times:         Implants:  Implants       Type Name Action Serial No.       52MM EMPHASYS ACETABULAR SHELL, MULTI-HOLE, CEMENTLESS Implanted      Screw SCREW CANCELLOUS 6.5 X 25 - [id number] Implanted      Screw SCREW CANCELLOUS 6.5 X 20 - [id number] Implanted      Screw SCREW CANCELL 6.5 X 15 - [id number] Implanted       52MM X 36MM EMPHASYS POLYETHYLENE LINER, AOX, NEUTRAL Implanted      Joint Hip STEM, FEMORAL, ACTIS COLLAR, STD, SIZE 8 - [id number] Implanted      Joint Hip FEMORAL HEAD, CERAMIC 36 +1.5 - [id number] Implanted               Findings: DJD Left hip.  Poor bone quality.  Morbid obesity.    Implants:  Depuy Emphasys Three Hole Acetabular Shell 52 OD   Depuy Actis Std offset, size 8  ALTRX Neutral 36 mm ID   Biolox Delta Ceramic 36 OD, +1.5, 12/14 taper   Bone screws x 3 (15, 20 and 25 mm)      Complications:  None.    Position: Lateral Decubitus    Medications: Ancef, TXA    History and indications:  The patient is a 64 y.o. y.o. female with endstage DJD of the hip. The patient has failed conservative measures and has elected to proceed with total hip arthroplasty. We discussed in detail the risks the benefits and the alternatives to surgery. The patient understands the risks would include but are not limited to infection, fracture, dislocation, stiffness, leg length inequality, chronic pain, disability, wear, loosening, reaction to implanted materials, DVT, PE, MI, stroke, loss of limb, loss of life, or potential need for further surgery. The patient understands these risks and has elected to proceed.      Procedure:  The patient was properly identified in the holding area using name, medical record number, and date of  birth. Informed consent was then signed and the operative extremity was marked. Next, the patient was brought back to the operating room. Spinal was initiated without difficulty. The patient was placed in a well-padded lateral decubitus position on the table, all bony prominences were well padded, and the operative lower extremity was prepped and draped in normal sterile fashion. The patient did receive pre-operative antibiotics.  A preprocedural timeout was then performed once again confirming the patient's correct identity, correct procedure, correct side, and correct site. In addition, allergy status and required equipment were reviewed. Three independent members of the operating room team agreed on the above-mentioned parameters.      A skin incision was made centered over the greater trochanter. Electrocautery was used to coagulate vessels. Dissection was then carried down to the level of the fascia. The fascia was then incised along its axis. Charnley retractors were placed. The leg was internally rotated. The piriformis tendon was identified and dissected free from its insertion and then tagged. The remainder of the short external rotators and the capsule was removed in a single layer and tagged with #5 Ethibond suture.  Once the capsule was sufficiently released, the hip was dislocated. Dissection was carefully carried down to the level of the lesser trochanter. Retractors were carefully placed around the neck. The neck was cut at the templated length. The neck cut was measured off of the level of the lesser trochanter to correspond to the templated neck cut. An oscillating saw was used to perform the neck cut, and the head was placed on the back table.       Acetabular retractors were carefully placed around the acetabulum. A labrectomy was performed. The soft tissue was removed from the cotyloid fossa. Reaming was then carried out first to medialize, and then in the appropriate direction until appropriate  fit was obtained. The acetabulum was irrigated. The final acetabular component was impacted into place in the appropriate position. Screws were used for additional fixation. The acetabular liner was impacted in place. Impinging structures anteriorly and osteophytes were subsequently then removed.      Attention was then turned towards the femur. Soft tissue was removed from the lateral aspect of the neck cut. A box osteotome and canal finder were used to gain access to the canal. A lateralizing rasp was used to ensure that I was lateral. Sequential broaching was then carried up to a size 7. This was found to be rotationally stable. Trial head and neck were placed on the broach. The hip was reduced and then taken through a range of motion. The leg lengths appeared equal, the hip had appropriate soft tissue tension, and appropriate range of motion. An intra-operative x-ray was obtained and reviewed.  The stem appeared to be undersized so I broached up to a size 8.  This was rotationally stable.  At this time the trial femoral component was removed. The canal was irrigated. The final components were then impacted into place. The hip was then reduced. A primary capsular repair was performed. The fascia was closed primarily. Tissues were then injected with a local anesthetic. The skin was then closed in multiple layers with the superficial layer closed with Monocryl and skin glue. The hip was dressed sterilely.  Given her history of pelvic radiation, I felt it is medically necessary and appropriate to utilize an incisional wound VAC.  One was applied in standard fashion.  The patient was taken to the recovery room in stable condition.      I attest that I was present and scrubbed for all key portions of this case. I supervised the entire case.     Complications:  None; patient tolerated the procedure well.    Disposition: PACU - hemodynamically stable.  Condition: stable     Additional Details: I am signing a 22 modifier  to this case.  The patient is morbidly obese which increased the complexity of the dissection, difficulty of retraction and blood loss.  Secondly, the patient had poor bone quality likely due to her history of pelvic radiation.    Attending Attestation: I was present and scrubbed for the key portions of the procedure.    Rob Razo  Phone Number: [phone]

## 2025-01-10 NOTE — PROGRESS NOTES
01/10/25 1352   Discharge Planning   Living Arrangements Spouse/significant other   Support Systems Spouse/significant other   Assistance Needed home with 24/7 support   Type of Residence Private residence   Number of Stairs to Enter Residence 3   Number of Stairs Within Residence 14   Do you have animals or pets at home? Yes   Type of Animals or Pets 2 dogs 1 cat   Who is requesting discharge planning? Provider   Home or Post Acute Services In home services   Type of Home Care Services Home PT;Home OT   Expected Discharge Disposition Home H   Does the patient need discharge transport arranged? No   Financial Resource Strain   How hard is it for you to pay for the very basics like food, housing, medical care, and heating? Not hard   Housing Stability   In the last 12 months, was there a time when you were not able to pay the mortgage or rent on time? N   In the past 12 months, how many times have you moved where you were living? 0   At any time in the past 12 months, were you homeless or living in a shelter (including now)? N   Transportation Needs   In the past 12 months, has lack of transportation kept you from medical appointments or from getting medications? no   In the past 12 months, has lack of transportation kept you from meetings, work, or from getting things needed for daily living? No   Patient Choice   Provider Choice list and CMS website (https://medicare.gov/care-compare#search) for post-acute Quality and Resource Measure Data were provided and reviewed with: Patient   Patient / Family choosing to utilize agency / facility established prior to hospitalization No     DC plan is home with [facility].  ADOD is 1/11.   Pt has ice machine, walker, grab bars in bathroom, raised toilets.   Stephany sent to [facility] to confirm referral and SOC.

## 2025-01-10 NOTE — NURSING NOTE
Admitted to room 221 from PACU. Oriented to roon. Call light given.Left hip dressing intact with no noted drainage. Iceman maintained.

## 2025-01-11 VITALS
DIASTOLIC BLOOD PRESSURE: 85 MMHG | HEIGHT: 66 IN | OXYGEN SATURATION: 94 % | WEIGHT: 230.16 LBS | TEMPERATURE: 97.7 F | SYSTOLIC BLOOD PRESSURE: 158 MMHG | BODY MASS INDEX: 36.99 KG/M2 | RESPIRATION RATE: 16 BRPM | HEART RATE: 70 BPM

## 2025-01-11 PROBLEM — M16.12 PRIMARY OSTEOARTHRITIS OF LEFT HIP: Status: RESOLVED | Noted: 2024-03-05 | Resolved: 2025-01-11

## 2025-01-11 PROBLEM — M16.12 OSTEOARTHRITIS OF LEFT HIP, UNSPECIFIED OSTEOARTHRITIS TYPE: Status: RESOLVED | Noted: 2025-01-10 | Resolved: 2025-01-11

## 2025-01-11 LAB
ANION GAP SERPL CALC-SCNC: 13 MMOL/L (ref 10–20)
BUN SERPL-MCNC: 27 MG/DL (ref 6–23)
CALCIUM SERPL-MCNC: 9.7 MG/DL (ref 8.6–10.3)
CHLORIDE SERPL-SCNC: 105 MMOL/L (ref 98–107)
CO2 SERPL-SCNC: 23 MMOL/L (ref 21–32)
CREAT SERPL-MCNC: 0.84 MG/DL (ref 0.5–1.05)
EGFRCR SERPLBLD CKD-EPI 2021: 77 ML/MIN/1.73M*2
ERYTHROCYTE [DISTWIDTH] IN BLOOD BY AUTOMATED COUNT: 13.7 % (ref 11.5–14.5)
GLUCOSE SERPL-MCNC: 131 MG/DL (ref 74–99)
HCT VFR BLD AUTO: 38 % (ref 36–46)
HGB BLD-MCNC: 12.3 G/DL (ref 12–16)
MCH RBC QN AUTO: 28.8 PG (ref 26–34)
MCHC RBC AUTO-ENTMCNC: 32.4 G/DL (ref 32–36)
MCV RBC AUTO: 89 FL (ref 80–100)
NRBC BLD-RTO: ABNORMAL /100{WBCS}
PLATELET # BLD AUTO: 237 X10*3/UL (ref 150–450)
POTASSIUM SERPL-SCNC: 4.6 MMOL/L (ref 3.5–5.3)
RBC # BLD AUTO: 4.27 X10*6/UL (ref 4–5.2)
SODIUM SERPL-SCNC: 136 MMOL/L (ref 136–145)
WBC # BLD AUTO: 11.8 X10*3/UL (ref 4.4–11.3)

## 2025-01-11 PROCEDURE — 2500000001 HC RX 250 WO HCPCS SELF ADMINISTERED DRUGS (ALT 637 FOR MEDICARE OP)

## 2025-01-11 PROCEDURE — 7100000011 HC EXTENDED STAY RECOVERY HOURLY - NURSING UNIT

## 2025-01-11 PROCEDURE — 2500000004 HC RX 250 GENERAL PHARMACY W/ HCPCS (ALT 636 FOR OP/ED): Mod: JZ

## 2025-01-11 PROCEDURE — 2500000001 HC RX 250 WO HCPCS SELF ADMINISTERED DRUGS (ALT 637 FOR MEDICARE OP): Performed by: STUDENT IN AN ORGANIZED HEALTH CARE EDUCATION/TRAINING PROGRAM

## 2025-01-11 PROCEDURE — 99221 1ST HOSP IP/OBS SF/LOW 40: CPT | Performed by: EMERGENCY MEDICINE

## 2025-01-11 PROCEDURE — 97530 THERAPEUTIC ACTIVITIES: CPT | Mod: GP

## 2025-01-11 PROCEDURE — 80048 BASIC METABOLIC PNL TOTAL CA: CPT

## 2025-01-11 PROCEDURE — 97116 GAIT TRAINING THERAPY: CPT | Mod: GP

## 2025-01-11 PROCEDURE — 36415 COLL VENOUS BLD VENIPUNCTURE: CPT

## 2025-01-11 PROCEDURE — 85027 COMPLETE CBC AUTOMATED: CPT

## 2025-01-11 RX ADMIN — ASPIRIN 81 MG: 81 TABLET, COATED ORAL at 08:50

## 2025-01-11 RX ADMIN — POLYETHYLENE GLYCOL 3350 17 G: 17 POWDER, FOR SOLUTION ORAL at 10:09

## 2025-01-11 RX ADMIN — OXYCODONE 5 MG: 5 TABLET ORAL at 04:20

## 2025-01-11 RX ADMIN — KETOROLAC TROMETHAMINE 15 MG: 15 INJECTION, SOLUTION INTRAMUSCULAR; INTRAVENOUS at 10:09

## 2025-01-11 RX ADMIN — KETOROLAC TROMETHAMINE 15 MG: 15 INJECTION, SOLUTION INTRAMUSCULAR; INTRAVENOUS at 04:19

## 2025-01-11 RX ADMIN — METHIMAZOLE 5 MG: 5 TABLET ORAL at 08:58

## 2025-01-11 RX ADMIN — PANTOPRAZOLE SODIUM 40 MG: 40 TABLET, DELAYED RELEASE ORAL at 06:15

## 2025-01-11 RX ADMIN — OXYCODONE 5 MG: 5 TABLET ORAL at 12:38

## 2025-01-11 RX ADMIN — CEFAZOLIN SODIUM 2 G: 2 INJECTION, SOLUTION INTRAVENOUS at 01:05

## 2025-01-11 RX ADMIN — ACETAMINOPHEN 975 MG: 325 TABLET, FILM COATED ORAL at 06:15

## 2025-01-11 RX ADMIN — FERROUS SULFATE TAB 325 MG (65 MG ELEMENTAL FE) 325 MG: 325 (65 FE) TAB at 08:50

## 2025-01-11 RX ADMIN — CEFAZOLIN SODIUM 2 G: 2 INJECTION, SOLUTION INTRAVENOUS at 08:50

## 2025-01-11 RX ADMIN — DOCUSATE SODIUM 100 MG: 100 CAPSULE, LIQUID FILLED ORAL at 08:50

## 2025-01-11 ASSESSMENT — COGNITIVE AND FUNCTIONAL STATUS - GENERAL
WALKING IN HOSPITAL ROOM: A LITTLE
TURNING FROM BACK TO SIDE WHILE IN FLAT BAD: A LITTLE
WALKING IN HOSPITAL ROOM: A LITTLE
MOVING TO AND FROM BED TO CHAIR: A LITTLE
STANDING UP FROM CHAIR USING ARMS: A LITTLE
DRESSING REGULAR LOWER BODY CLOTHING: A LITTLE
CLIMB 3 TO 5 STEPS WITH RAILING: A LITTLE
TURNING FROM BACK TO SIDE WHILE IN FLAT BAD: A LITTLE
MOBILITY SCORE: 18
MOBILITY SCORE: 19
STANDING UP FROM CHAIR USING ARMS: A LITTLE
MOVING FROM LYING ON BACK TO SITTING ON SIDE OF FLAT BED WITH BEDRAILS: A LITTLE
HELP NEEDED FOR BATHING: A LITTLE
TOILETING: A LITTLE
CLIMB 3 TO 5 STEPS WITH RAILING: A LITTLE
MOVING TO AND FROM BED TO CHAIR: A LITTLE
DAILY ACTIVITIY SCORE: 21

## 2025-01-11 ASSESSMENT — PAIN SCALES - GENERAL
PAINLEVEL_OUTOF10: 2
PAINLEVEL_OUTOF10: 0 - NO PAIN
PAINLEVEL_OUTOF10: 1
PAINLEVEL_OUTOF10: 2
PAINLEVEL_OUTOF10: 4
PAINLEVEL_OUTOF10: 6
PAINLEVEL_OUTOF10: 0 - NO PAIN
PAINLEVEL_OUTOF10: 0 - NO PAIN

## 2025-01-11 ASSESSMENT — PAIN - FUNCTIONAL ASSESSMENT
PAIN_FUNCTIONAL_ASSESSMENT: 0-10
PAIN_FUNCTIONAL_ASSESSMENT: UNABLE TO SELF-REPORT
PAIN_FUNCTIONAL_ASSESSMENT: 0-10
PAIN_FUNCTIONAL_ASSESSMENT: UNABLE TO SELF-REPORT
PAIN_FUNCTIONAL_ASSESSMENT: 0-10
PAIN_FUNCTIONAL_ASSESSMENT: 0-10

## 2025-01-11 ASSESSMENT — PAIN DESCRIPTION - DESCRIPTORS
DESCRIPTORS: ACHING

## 2025-01-11 ASSESSMENT — PAIN DESCRIPTION - ORIENTATION: ORIENTATION: LEFT

## 2025-01-11 ASSESSMENT — PAIN DESCRIPTION - LOCATION: LOCATION: HIP

## 2025-01-11 NOTE — PROGRESS NOTES
Haiku sent to [facility] to confirm SOC.  Await confirmation.

## 2025-01-11 NOTE — PROGRESS NOTES
"Physical Therapy Treatment    Patient Name: Kelli Brito \"Cecelia\"  MRN: [id number]  Department: [facility]  Room: [address] [place], [place] [address]  Today's Date: 1/11/2025  Time Calculation  Start Time: 0811  Stop Time: 0842  Time Calculation (min): 31 min     Assessment/Plan   PT Assessment  PT Assessment Results: Decreased strength, Decreased range of motion, Impaired balance, Decreased mobility, Orthopedic restrictions, Pain  Rehab Prognosis: Good  Barriers to Discharge Home: No anticipated barriers  Evaluation/Treatment Tolerance: Patient tolerated treatment well  Medical Staff Made Aware: Yes  End of Session Communication: Bedside nurse  Assessment Comment: Pt participating in bed mobility, transfers, giat and stair training with precautions maintained throughout session. Pt would benefit from continued PT to continue to improve functional mobility and independence.  End of Session Patient Position: Up in chair, Alarm on  PT Plan  Inpatient/Swing Bed or Outpatient: Inpatient  PT Plan  Treatment/Interventions: Bed mobility, Transfer training, Gait training, Stair training, Balance training, Neuromuscular re-education, Strengthening, Endurance training, Range of motion, Therapeutic exercise, Therapeutic activity, Home exercise program  PT Plan: Ongoing PT  PT Frequency: BID  PT Discharge Recommendations: Low intensity level of continued care  Equipment Recommended upon Discharge: Wheeled walker, Straight cane  PT Recommended Transfer Status: Assist x1  PT - OK to Discharge: Yes    General Visit Information:   PT  Visit  PT Received On: 01/11/25  Response to Previous Treatment: Patient with no complaints from previous session.  General  Reason for Referral: L posterior HANSEL (1/10/2025)  Referred By: Dr. Razo  Past Medical History Relevant to Rehab: HTN, GERD, Graves disease, uterine CA, R HANSEL  Family/Caregiver Present: No  Prior to Session Communication: Bedside nurse  Patient Position Received: Bed, 3 rail up, Alarm on    Subjective "   Precautions:  Precautions  LE Weight Bearing Status: Weight Bearing as Tolerated  Medical Precautions: Fall precautions  Post-Surgical Precautions: Left hip precautions    Objective   Pain:  Pain Assessment  Pain Assessment: 0-10  0-10 (Numeric) Pain Score: 1  Pain Type: Surgical pain  Pain Location: Hip  Pain Orientation: Left  Patient's Stated Pain Goal: No pain  Pain Interventions: Repositioned, Ambulation/increased activity  Response to Interventions: Content/relaxed  Cognition:  Cognition  Overall Cognitive Status: Within Functional Limits  Postural Control:  Static Sitting Balance  Static Sitting-Balance Support: No upper extremity supported, Feet supported  Static Sitting-Level of Assistance: Independent  Dynamic Sitting Balance  Dynamic Sitting-Balance Support: Feet supported, No upper extremity supported  Dynamic Sitting-Level of Assistance: Independent  Static Standing Balance  Static Standing-Balance Support: Bilateral upper extremity supported  Static Standing-Level of Assistance: Close supervision, Distant supervision  Dynamic Standing Balance  Dynamic Standing-Balance Support: Bilateral upper extremity supported  Dynamic Standing-Level of Assistance: Close supervision, Distant supervision  Extremity/Trunk Assessments:  RUE   RUE : Within Functional Limits  LUE   LUE: Within Functional Limits  RLE   RLE : Within Functional Limits  LLE   LLE : Exceptions to WFL (Hip within precautions throughout session < 90 deg hip flexion)  Activity Tolerance:  Activity Tolerance  Endurance: Endurance does not limit participation in activity  Treatments:  Therapeutic Exercise  Therapeutic Exercise Performed: No    Therapeutic Activity  Therapeutic Activity Performed: Yes  Therapeutic Activity 1: Pt educated in Precautions, WB status, HEP importance, transfers to RW, safety with mobility, importance of mobility, staff assistance out of bed   Lower body dressing / dressing tasks  with some assist for dressing needed  for lower body         Bed Mobility  Bed Mobility: Yes  Bed Mobility 1  Bed Mobility 1: Supine to sitting  Level of Assistance 1: Close supervision  Bed Mobility Comments 1: x 1 trial, good technique within precautions noted    Ambulation/Gait Training  Ambulation/Gait Training Performed: Yes  Ambulation/Gait Training 1  Surface 1: Level tile  Device 1: Rolling walker  Assistance 1: Close supervision, Distant supervision  Quality of Gait 1: Diminished heel strike, Decreased step length, Antalgic  Comments/Distance (ft) 1: 30 ft, 130 ft x 2 trials, min verbal cues for upright posture, step through pattern, turning with RW  Transfers  Transfer: Yes  Transfer 1  Transfer From 1: Bed to  Transfer to 1: Stand  Technique 1: Sit to stand, Stand to sit  Transfer Device 1: Walker  Transfer Level of Assistance 1: Distant supervision, Close supervision  Trials/Comments 1: x 3 trials, min verbal cues for hand placement and technique  Transfers 2  Transfer From 2: Toilet to  Transfer to 2: Stand  Technique 2: Sit to stand, Stand to sit  Transfer Device 2: Walker  Transfer Level of Assistance 2: Close supervision  Trials/Comments 2: x 1 trial each, assist on RW and grab bar in bathroom  Transfers 3  Transfer From 3: Chair with arms to  Transfer to 3: Stand  Technique 3: Sit to stand, Stand to sit  Transfer Device 3: Walker  Transfer Level of Assistance 3: Distant supervision  Trials/Comments 3: x 2 trials, BUE assistance    Stairs  Stairs: Yes  Stairs  Rails 1: Right  Curb Step 1: No  Device 1: Railing, Single point cane  Assistance 1: Close supervision  Comment/Number of Steps 1: 3 steps x 1 trial, nonreciprocal pattern; demo and instruction provided prior to pt attempt; pt verbalized comfort in stair management post training.    Outcome Measures:  [facility] Basic Mobility  Turning from your back to your side while in a flat bed without using bedrails: A little  Moving from lying on your back to sitting on the side of a flat bed  without using bedrails: A little  Moving to and from bed to chair (including a wheelchair): A little  Standing up from a chair using your arms (e.g. wheelchair or bedside chair): A little  To walk in hospital room: A little  Climbing 3-5 steps with railing: A little  Basic Mobility - Total Score: 18    Education Documentation  Handouts, taught by Lis Leija, PT at 1/11/2025 12:21 PM.  Learner: Patient  Readiness: Acceptance  Method: Explanation, Demonstration, Handout  Response: Verbalizes Understanding, Demonstrated Understanding  Comment: Precautions, WB status, HEP with rationale, transfers to RW, safety with mobility, importance of mobility, staff assistance out of bed, HANSEL posterior precautions with examples provided, IcingLower body dressing / dressing tasksSafe stair management    Precautions, taught by Lis Leija, PT at 1/11/2025 12:21 PM.  Learner: Patient  Readiness: Acceptance  Method: Explanation, Demonstration, Handout  Response: Verbalizes Understanding, Demonstrated Understanding  Comment: Precautions, WB status, HEP with rationale, transfers to RW, safety with mobility, importance of mobility, staff assistance out of bed, HANSEL posterior precautions with examples provided, IcingLower body dressing / dressing tasksSafe stair management    Body Mechanics, taught by Lis Leija, PT at 1/11/2025 12:21 PM.  Learner: Patient  Readiness: Acceptance  Method: Explanation, Demonstration, Handout  Response: Verbalizes Understanding, Demonstrated Understanding  Comment: Precautions, WB status, HEP with rationale, transfers to RW, safety with mobility, importance of mobility, staff assistance out of bed, HANSEL posterior precautions with examples provided, IcingLower body dressing / dressing tasksSafe stair management    Home Exercise Program, taught by Lis Leija, PT at 1/11/2025 12:21 PM.  Learner: Patient  Readiness: Acceptance  Method: Explanation, Demonstration, Handout  Response:  Verbalizes Understanding, Demonstrated Understanding  Comment: Precautions, WB status, HEP with rationale, transfers to RW, safety with mobility, importance of mobility, staff assistance out of bed, HANSEL posterior precautions with examples provided, IcingLower body dressing / dressing tasksSafe stair management    Mobility Training, taught by Lis Leija, PT at 1/11/2025 12:21 PM.  Learner: Patient  Readiness: Acceptance  Method: Explanation, Demonstration, Handout  Response: Verbalizes Understanding, Demonstrated Understanding  Comment: Precautions, WB status, HEP with rationale, transfers to RW, safety with mobility, importance of mobility, staff assistance out of bed, HANSEL posterior precautions with examples provided, IcingLower body dressing / dressing tasksSafe stair management    Education Comments  No comments found.        OP EDUCATION:       Encounter Problems       Encounter Problems (Active)       Balance       LTG - Patient will demonstrate Intervention to enhance balance for safe completion of daily activities (Progressing)       Start:  01/10/25               Compromised Skin Integrity       LTG - Patient will be free from infection (Progressing)       Start:  01/10/25               Mobility       LTG - Patient will ambulate community distance w/Ritika and LRAD (Progressing)       Start:  01/10/25            LTG - Patient will navigate 3 steps with 1 rails and/or LRAD (Progressing)       Start:  01/10/25               PT Transfers       LTG - Patient will demonstrate safe transfer techniques w/Ritika and LRAD (Progressing)       Start:  01/10/25               Pain          Pain - Adult          Safety       LTG - Patient will adhere to hip precautions during ADL's and transfers (Progressing)       Start:  01/10/25            LTG - Patient will demonstrate safety requirements appropriate to situation/environment (Progressing)       Start:  01/10/25

## 2025-01-11 NOTE — CARE PLAN
The patient's goals for the shift include pain management    The clinical goals for the shift include Pain control    Over the shift, the patient did  make progress toward the following goals.     Patients' pain was well controlled.    Problem: Skin  Goal: Decreased wound size/increased tissue granulation at next dressing change  Outcome: Progressing     Problem: Skin  Goal: Participates in plan/prevention/treatment measures  Outcome: Progressing     Problem: Skin  Goal: Prevent/manage excess moisture  Outcome: Progressing     Problem: Skin  Goal: Prevent/minimize sheer/friction injuries  Outcome: Progressing     Problem: Skin  Goal: Promote/optimize nutrition  Outcome: Progressing     Problem: Skin  Goal: Promote skin healing  Outcome: Progressing     Problem: Pain - Adult  Goal: Verbalizes/displays adequate comfort level or baseline comfort level  Outcome: Progressing     Problem: Discharge Planning  Goal: Discharge to home or other facility with appropriate resources  Outcome: Progressing     Problem: Chronic Conditions and Co-morbidities  Goal: Patient's chronic conditions and co-morbidity symptoms are monitored and maintained or improved  Outcome: Progressing     Problem: Pain  Goal: Takes deep breaths with improved pain control throughout the shift  Outcome: Progressing     Problem: Pain  Goal: Turns in bed with improved pain control throughout the shift  Outcome: Progressing     Problem: Pain  Goal: Walks with improved pain control throughout the shift  Outcome: Progressing     Problem: Pain  Goal: Performs ADL's with improved pain control throughout shift  Outcome: Progressing     Problem: Pain  Goal: Participates in PT with improved pain control throughout the shift  Outcome: Progressing

## 2025-01-11 NOTE — PROGRESS NOTES
Patient seen, chart reviewed.  Pain is well-controlled.  No acute complaints.  Vital signs are stable.  Nursing notes no issues.    Manisha Shea MD

## 2025-01-11 NOTE — NURSING NOTE
Rounded on patient. She was sitting up in bed eating breakfast. No complaint of pain or discomfort. She commented that her stay thus far had been good. She told me that she has been up to the bathroom and that she is looking forward to going home. I refreshed her water, and  Vitals were obtained. See flowsheet for details. Will continue to monitor.

## 2025-01-11 NOTE — NURSING NOTE
Assumed care of patient this am. Patient lying in bed. She voices no c/o pain/discomfort. No c/o any type voiced. Dressing clean/dry/intact to left hip/  Call light within reach, continue to monitor.

## 2025-01-11 NOTE — NURSING NOTE
"Resting in bed watching TV with call light within reach and bed in lowest/locked position.  Bed alarm is on.  Prevena drain to left hip is intact with no visible drainage noted.  Iceman cooler to left hip for comfort.  Medicated with oxycodone for c/o left hip surgical pain of \"6\".  "

## 2025-01-11 NOTE — NURSING NOTE
Patient discharged to home. All personal belongings packed and taken with patient. Discharge papers given and reviewed with patient.   Dressing to left hip clean/dry/intact upon discharge.  Saline lock removed and pressure dressing taped to the site.   Patient left floor per wheelchair, taken down by [facility].

## 2025-01-11 NOTE — PROGRESS NOTES
Medication Education     Medication education for Kelli Brito was provided to the patient  for the following medication(s):  Tylenol  Aspirin  Cefadroxil  Docusate  Zofran  Oxycodone  Pantoprazole  Senna  Tramadol    Medication education provided by a Pharmacist:  -Proper dose, indication, possible ADRs   -How the medication works and benefits of taking it  -Importance of compliance   -Potential duration of therapy    Identified potential barriers to education:  None    Method(s) of Education:  Verbal Written materials provided and reviewed    An opportunity to ask questions and receive answers was provided.     Assessment of understanding the patient :  2= meets goals/outcomes    Additional Notes (if applicable): Meds to beds given to patient.    Stacy Thomas, TerryD

## 2025-01-11 NOTE — DISCHARGE SUMMARY
Discharge Diagnosis  Primary osteoarthritis of left hip    Issues Requiring Follow-Up  Physical therapy    Test Results Pending At Discharge  Pending Labs       No current pending labs.            Hospital Course  64 y.o. year-old female who presented with left hip OA. Patient is now s/p elective Left Total Hip Arthroplasty on 1/10/25 by Dr. Razo. Underwent successful Left Total Hip Arthroplasty. Patient had uneventful hospital course. On the day of surgery, patient was identified in the pre-operative holding area and agreeable to proceed with surgery. Written consent was obtained. Please see operative note for further details of this procedure. Patient received 24 hours of raheel-operative antibiotics. Patient recovered in the PACU before transfer to a regular nursing floor. Patient was started on multimodal pain control and ASA 81 mg bid for DVT prophylaxis. Pain was appropriately controlled. Diet was advanced as tolerated. Physical therapy was consulted and recommended continued recovery at home with continued physical therapy and wound care. On the day of discharge, patient was afebrile with stable vital signs. Patient was neurovascularly intact at time of discharge. Patient was discharged with prescription of ASA 81 mg bid for DVT prophylaxis for 4 weeks.      Pertinent Physical Exam At Time of Discharge  Physical Exam  General: No acute distress  Respirations: Nonlabored breathing  Musculoskeletal: Able to fire tibialis anterior, gastrocsoleus extensor houses longus.  Intact quad strength.  Sensation at baseline.  Palpable pulses.    Home Medications     Medication List      START taking these medications     acetaminophen 500 mg tablet; Commonly known as: Tylenol; Take 2 tablets   (1,000 mg) by mouth every 8 hours for 20 days.   aspirin 81 mg chewable tablet; Chew 1 tablet (81 mg) 2 times a day.;   Replaces: aspirin 81 mg EC tablet   cefadroxil 500 mg capsule; Commonly known as: Duricef; Take 1 capsule    (500 mg) by mouth 2 times a day for 5 days.   docusate sodium 100 mg capsule; Commonly known as: Colace; Take 1   capsule (100 mg) by mouth 2 times a day for 15 days.   ondansetron 4 mg tablet; Commonly known as: Zofran; Take 1 tablet (4 mg)   by mouth every 8 hours if needed for nausea or vomiting.   oxyCODONE 5 mg immediate release tablet; Commonly known as: Roxicodone;   Take 1-2 tablets (5-10 mg) by mouth every 6 hours if needed for severe   pain (7 - 10) for up to 7 days.   pantoprazole 40 mg EC tablet; Commonly known as: ProtoNix; Take 1 tablet   (40 mg) by mouth once daily in the morning. Take before meals. Do not   crush, chew, or split.   senna 8.6 mg tablet; Generic drug: sennosides; Take 1 tablet (8.6 mg) by   mouth once daily for 15 days.   traMADol 50 mg tablet; Commonly known as: Ultram; Take 1-2 tablets   ( mg) by mouth every 6 hours if needed for severe pain (7 - 10) for   up to 7 days.     CONTINUE taking these medications     atorvastatin 20 mg tablet; Commonly known as: Lipitor; TAKE 1 TABLET BY   MOUTH EVERY DAY   CALTRATE 600 ORAL   METAMUCIL (SUGAR) ORAL   methIMAzole 5 mg tablet; Commonly known as: Tapazole; Take 1 tablet (5   mg) by mouth once daily.     STOP taking these medications     aspirin 81 mg EC tablet; Replaced by: aspirin 81 mg chewable tablet   ibuprofen 200 mg tablet       Outpatient Follow-Up  Future Appointments   Date Time Provider Department [place]   1/28/2025 11:00 AM Daphnie Gamboa PA-C [id number] [facility]   2/20/2025  2:00 PM Daphnie Gamboa PA-C [id number] [facility]   3/31/2025  3:00 PM Daniel Munson MD [id number] [facility]   5/15/2025  9:20 AM Kita Marsh, APRN-CNP [id number] [facility]       Rob Razo MD

## 2025-01-11 NOTE — CARE PLAN
Problem: Safety  Goal: LTG - Patient will adhere to hip precautions during ADL's and transfers  Outcome: Progressing  Goal: LTG - Patient will demonstrate safety requirements appropriate to situation/environment  Outcome: Progressing     Problem: Balance  Goal: LTG - Patient will demonstrate Intervention to enhance balance for safe completion of daily activities  Outcome: Progressing     Problem: Mobility  Goal: LTG - Patient will ambulate community distance w/Ritika and LRAD  Outcome: Progressing  Goal: LTG - Patient will navigate 3 steps with 1 rails and/or LRAD  Outcome: Progressing     Problem: PT Transfers  Goal: LTG - Patient will demonstrate safe transfer techniques w/Ritika and LRAD  Outcome: Progressing

## 2025-01-11 NOTE — CONSULTS
Consults    Reason For Consult  Medical management for history of uterine cancer GERD hypercholesterol hypertension and hyper thyroid and postop pain    History Of Present Illness  Cecelia Brito is a 64 y.o. female presenting with left total hip replacement she has remained hemodynamically stable postoperatively without complication.     Past Medical History  She has a past medical history of Anxiety disorder, unspecified (10/27/2015), Endometrial cancer (Multi), GERD (gastroesophageal reflux disease), Hypercholesteremia, Hyperlipidemia, Hypertension, Hyperthyroidism (09/24), Obesity, Other seasonal allergic rhinitis (01/20/2015), Personal history of irradiation, Personal history of malignant neoplasm, unspecified, Personal history of other diseases of the circulatory system, Personal history of other diseases of the digestive system, Personal history of other diseases of the musculoskeletal system and connective tissue, Personal history of other diseases of the nervous system and sense organs, Personal history of urinary calculi, Sleep apnea, Thyroid nodule, and Uterine cancer (Multi).    She has no past medical history of Anemia, Asthma, Awareness under anesthesia, Cerebral vascular accident (Multi), CHF (congestive heart failure), Chronic kidney disease, COPD (chronic obstructive pulmonary disease) (Multi), Coronary artery disease, Delayed emergence from general anesthesia, Diabetes mellitus type I (Multi), Hard to intubate, History of blood transfusion, HL (hearing loss), Irregular heart beat, Malignant hyperthermia, Myocardial infarction (Multi), PONV (postoperative nausea and vomiting), Refusal of blood product, Seizure disorder (Multi), Stroke (Multi), TIA (transient ischemic attack), Type 2 diabetes mellitus, or Vision loss.    Surgical History  She has a past surgical history that includes Other surgical history (11/29/2022); Other surgical history (11/29/2022); US guided fine percutaneous aspiration  (12/08/2022); Hysterectomy; and Hip Arthroplasty (Right).     Social History  She reports that she has never smoked. She has never used smokeless tobacco. She reports that she does not drink alcohol and does not use drugs.    Family History  Family History   Problem Relation Name Age of Onset    Lung cancer Father Sudhakar     Cancer Father Sudhakar     Lung cancer Paternal Grandmother Hailey     Cancer Paternal Grandmother Hailey     Cancer Father's Sister Hattie         Allergies  Azithromycin, Biotene dry mouth, Grass pollen, Loratadine, Mold, Saliva stimulant comb. no.3, and Gabapentin    Review of Systems  10 system review noncontributory  Physical Exam  Patient is alert in no acute distress  Lungs are clear to auscultation and percussion  Cardiac is regular rate and rhythm normal S1-S2 without murmur gallop rub click S3 or S4  Abdomen is soft nontender positive bowel sounds there is no hepatosplenomegaly or CVA tenderness appreciated  Extremities without cyanosis clubbing erythema or edema  Operative site exam per Ortho  Last Recorded Vitals  /85 (BP Location: Right arm, Patient Position: Lying)   Pulse 70   Temp 36.5 °C (97.7 °F) (Temporal)   Resp 16   Wt 104 kg (230 lb 2.6 oz)   SpO2 94%     Relevant Results  Scheduled medications  acetaminophen, 975 mg, oral, q8h  aspirin, 81 mg, oral, BID  atorvastatin, 20 mg, oral, Nightly  ceFAZolin, 2 g, intravenous, q8h  docusate sodium, 100 mg, oral, BID  ferrous sulfate (325 mg ferrous sulfate), 65 mg of iron, oral, BID  methIMAzole, 5 mg, oral, Daily  pantoprazole, 40 mg, oral, Daily before breakfast  polyethylene glycol, 17 g, oral, Daily  scopolamine, 1 patch, transdermal, Once      Continuous medications  lactated Ringer's, 100 mL/hr, Last Rate: Stopped (01/10/25 0144)      PRN medications  PRN medications: benzocaine-menthol, bisacodyl, calcium carbonate, HYDROmorphone, lubricating eye drops, melatonin, naloxone, ondansetron ODT **OR** ondansetron,  oxyCODONE, oxyCODONE, oxygen, simethicone, sodium chloride    Results for orders placed or performed during the hospital encounter of 01/10/25 (from the past 24 hours)   CBC   Result Value Ref Range    WBC 11.8 (H) 4.4 - 11.3 x10*3/uL    nRBC      RBC 4.27 4.00 - 5.20 x10*6/uL    Hemoglobin 12.3 12.0 - 16.0 g/dL    Hematocrit 38.0 36.0 - 46.0 %    MCV 89 80 - 100 fL    MCH 28.8 26.0 - 34.0 pg    MCHC 32.4 32.0 - 36.0 g/dL    RDW 13.7 11.5 - 14.5 %    Platelets 237 150 - 450 x10*3/uL   Basic metabolic panel   Result Value Ref Range    Glucose 131 (H) 74 - 99 mg/dL    Sodium 136 136 - 145 mmol/L    Potassium 4.6 3.5 - 5.3 mmol/L    Chloride 105 98 - 107 mmol/L    Bicarbonate 23 21 - 32 mmol/L    Anion Gap 13 10 - 20 mmol/L    Urea Nitrogen 27 (H) 6 - 23 mg/dL    Creatinine 0.84 0.50 - 1.05 mg/dL    eGFR 77 >60 mL/min/1.73m*2    Calcium 9.7 8.6 - 10.3 mg/dL     *Note: Due to a large number of results and/or encounters for the requested time period, some results have not been displayed. A complete set of results can be found in Results Review.          Assessment/Plan   Status post left total hip replacement  - Management per Ortho  - PT  - Pain control  - Supportive care     Hyperlipidemia  - Continue current medications     Hypertension  - Stable  - Continue current medications     Hyperthyroidism  - Continue current medications  - Continue to monitor     DVT prophylaxis  - Aspirin protocol  - SCDs  - Ambulate     Patient is medically and clinically stable for discharge to home      Graciela Lindsey MD

## 2025-01-11 NOTE — NURSING NOTE
Resting in bed with eyes closed and call light within reach and bed alarm on.  Iceman cooler to left hip for comfort.  Respirations even and unlabored on room air.

## 2025-01-13 ENCOUNTER — HOME CARE VISIT (OUTPATIENT)
Dept: HOME HEALTH SERVICES | Facility: HOME HEALTH | Age: 65
End: 2025-01-13
Payer: MEDICARE

## 2025-01-13 VITALS
DIASTOLIC BLOOD PRESSURE: 75 MMHG | BODY MASS INDEX: 36.96 KG/M2 | WEIGHT: 230 LBS | SYSTOLIC BLOOD PRESSURE: 138 MMHG | HEIGHT: 66 IN | TEMPERATURE: 98.6 F | OXYGEN SATURATION: 96 % | HEART RATE: 77 BPM | RESPIRATION RATE: 18 BRPM

## 2025-01-13 PROCEDURE — G0151 HHCP-SERV OF PT,EA 15 MIN: HCPCS

## 2025-01-13 ASSESSMENT — ACTIVITIES OF DAILY LIVING (ADL)
ENTERING_EXITING_HOME: CONTACT GUARD ASSIST
AMBULATION ASSISTANCE ON FLAT SURFACES: 1
OASIS_M1830: 05

## 2025-01-13 ASSESSMENT — ENCOUNTER SYMPTOMS
PAIN LOCATION: LEFT HIP
HIGHEST PAIN SEVERITY IN PAST 24 HOURS: 6/10
PERSON REPORTING PAIN: PATIENT
HYPERTENSION: 1
PAIN: 1
SUBJECTIVE PAIN PROGRESSION: UNCHANGED
PAIN SEVERITY GOAL: 0/10
LOWEST PAIN SEVERITY IN PAST 24 HOURS: 0/10
LOWER EXTREMITY EDEMA: 1

## 2025-01-13 NOTE — SIGNIFICANT EVENT
Thank you for taking my call today regarding your recent joint replacement surgery with Dr. Rob Razo.      We discussed that: Home Health Care services (physical and/or occupational therapy) have been initiated Your pain is Controlled on the current regimen Will fluctuate throughout recovery with increased activity You are able to tolerate regular activity and exercises The importance of continued cold therapy throughout recovery The importance of following the prescribed precautions by your surgeon You have had a bowel movement The importance of continuing blood thinner as prescribed The importance of wearing compression stockings as prescribed    You indicated that all of your questions have been answered at the time of our call.    Please don't hesitate to reach out if you have any additional questions or concerns.    Sandi Salmeron MBA, BSN, RN-BC  Orthopedic Program Navigator  [facility]   [phone]

## 2025-01-13 NOTE — CASE COMMUNICATION
Patient admitted for PT services and home health services s/p LLE THR posterior approach. pt has prevena wound vac to be rmeoved in 7 days. After removal may inicsion be open to air? pt bandage intact , wound vac functioning and no s/s of infection noted. pt instructed on precuations. pt reports pain 0-6/10 LLE hip area. pt utilizing ice as directed and utilizing FWW as directed. pitcure uploaded of bandage to [facility]. pt agreeable to PT s wervices as indicated

## 2025-01-14 ENCOUNTER — HOME CARE VISIT (OUTPATIENT)
Dept: HOME HEALTH SERVICES | Facility: HOME HEALTH | Age: 65
End: 2025-01-14
Payer: MEDICARE

## 2025-01-14 VITALS
DIASTOLIC BLOOD PRESSURE: 78 MMHG | OXYGEN SATURATION: 100 % | HEART RATE: 66 BPM | TEMPERATURE: 97.5 F | SYSTOLIC BLOOD PRESSURE: 120 MMHG

## 2025-01-14 PROCEDURE — G0152 HHCP-SERV OF OT,EA 15 MIN: HCPCS

## 2025-01-14 ASSESSMENT — ENCOUNTER SYMPTOMS
HIGHEST PAIN SEVERITY IN PAST 24 HOURS: 7/10
PAIN LOCATION: LEFT HIP
PAIN: 1
PAIN LOCATION - PAIN SEVERITY: 1/10
SUBJECTIVE PAIN PROGRESSION: UNCHANGED
PAIN LOCATION - PAIN QUALITY: ACHY
LOWEST PAIN SEVERITY IN PAST 24 HOURS: 0/10
PERSON REPORTING PAIN: PATIENT
PAIN LOCATION - EXACERBATING FACTORS: HIP SURGERY
PAIN LOCATION - RELIEVING FACTORS: PAIN MEDS
PAIN LOCATION - PAIN FREQUENCY: FREQUENT
PAIN SEVERITY GOAL: 0/10

## 2025-01-15 ENCOUNTER — HOME CARE VISIT (OUTPATIENT)
Dept: HOME HEALTH SERVICES | Facility: HOME HEALTH | Age: 65
End: 2025-01-15
Payer: MEDICARE

## 2025-01-15 VITALS
TEMPERATURE: 96.9 F | DIASTOLIC BLOOD PRESSURE: 72 MMHG | SYSTOLIC BLOOD PRESSURE: 112 MMHG | OXYGEN SATURATION: 97 % | HEART RATE: 81 BPM

## 2025-01-15 PROCEDURE — G0157 HHC PT ASSISTANT EA 15: HCPCS | Mod: CQ

## 2025-01-15 ASSESSMENT — ENCOUNTER SYMPTOMS
PAIN LOCATION - RELIEVING FACTORS: ICE, MEDICATION
HIGHEST PAIN SEVERITY IN PAST 24 HOURS: 5/10
PAIN: 1
PAIN LOCATION - PAIN FREQUENCY: WITH ACTIVITY
PAIN LOCATION - PAIN SEVERITY: 2/10
PERSON REPORTING PAIN: PATIENT
LOWEST PAIN SEVERITY IN PAST 24 HOURS: 0/10
PAIN LOCATION - PAIN QUALITY: ACHING
PAIN LOCATION: LEFT HIP
PAIN LOCATION - EXACERBATING FACTORS: ACTIVITY
SUBJECTIVE PAIN PROGRESSION: WAXING AND WANING

## 2025-01-17 ENCOUNTER — HOME CARE VISIT (OUTPATIENT)
Dept: HOME HEALTH SERVICES | Facility: HOME HEALTH | Age: 65
End: 2025-01-17
Payer: MEDICARE

## 2025-01-17 VITALS
OXYGEN SATURATION: 98 % | RESPIRATION RATE: 18 BRPM | HEART RATE: 88 BPM | DIASTOLIC BLOOD PRESSURE: 62 MMHG | SYSTOLIC BLOOD PRESSURE: 119 MMHG | TEMPERATURE: 98 F

## 2025-01-17 PROCEDURE — G0151 HHCP-SERV OF PT,EA 15 MIN: HCPCS

## 2025-01-17 ASSESSMENT — ACTIVITIES OF DAILY LIVING (ADL): AMBULATION ASSISTANCE ON FLAT SURFACES: 1

## 2025-01-17 ASSESSMENT — ENCOUNTER SYMPTOMS
DENIES PAIN: 1
PERSON REPORTING PAIN: PATIENT

## 2025-01-20 ENCOUNTER — HOME CARE VISIT (OUTPATIENT)
Dept: HOME HEALTH SERVICES | Facility: HOME HEALTH | Age: 65
End: 2025-01-20
Payer: MEDICARE

## 2025-01-20 VITALS
DIASTOLIC BLOOD PRESSURE: 78 MMHG | SYSTOLIC BLOOD PRESSURE: 118 MMHG | OXYGEN SATURATION: 98 % | TEMPERATURE: 97.2 F | HEART RATE: 72 BPM

## 2025-01-20 PROCEDURE — G0157 HHC PT ASSISTANT EA 15: HCPCS | Mod: CQ

## 2025-01-20 ASSESSMENT — ENCOUNTER SYMPTOMS
PAIN LOCATION: LEFT HIP
PAIN LOCATION - PAIN QUALITY: ACHING
PAIN LOCATION - PAIN FREQUENCY: CONSTANT
LOWEST PAIN SEVERITY IN PAST 24 HOURS: 1/10
PAIN LOCATION - PAIN SEVERITY: 1/10
PERSON REPORTING PAIN: PATIENT
PAIN: 1
HIGHEST PAIN SEVERITY IN PAST 24 HOURS: 5/10
SUBJECTIVE PAIN PROGRESSION: WAXING AND WANING
PAIN LOCATION - RELIEVING FACTORS: MEDICATION, ICE

## 2025-01-24 ENCOUNTER — HOME CARE VISIT (OUTPATIENT)
Dept: HOME HEALTH SERVICES | Facility: HOME HEALTH | Age: 65
End: 2025-01-24
Payer: MEDICARE

## 2025-01-24 VITALS
TEMPERATURE: 96.8 F | SYSTOLIC BLOOD PRESSURE: 128 MMHG | DIASTOLIC BLOOD PRESSURE: 60 MMHG | HEART RATE: 66 BPM | OXYGEN SATURATION: 96 %

## 2025-01-24 PROCEDURE — G0157 HHC PT ASSISTANT EA 15: HCPCS | Mod: CQ

## 2025-01-24 ASSESSMENT — ENCOUNTER SYMPTOMS
HIGHEST PAIN SEVERITY IN PAST 24 HOURS: 3/10
PAIN LOCATION - PAIN QUALITY: ACHING
PAIN LOCATION - PAIN FREQUENCY: CONSTANT
PAIN LOCATION: LEFT LEG
PAIN LOCATION - RELIEVING FACTORS: TYLENOL
LOWEST PAIN SEVERITY IN PAST 24 HOURS: 1/10
SUBJECTIVE PAIN PROGRESSION: UNCHANGED
PERSON REPORTING PAIN: PATIENT
PAIN: 1
PAIN LOCATION - EXACERBATING FACTORS: STANDING STILL
PAIN LOCATION - PAIN SEVERITY: 1/10

## 2025-01-27 ENCOUNTER — HOME CARE VISIT (OUTPATIENT)
Dept: HOME HEALTH SERVICES | Facility: HOME HEALTH | Age: 65
End: 2025-01-27
Payer: MEDICARE

## 2025-01-27 VITALS
HEART RATE: 77 BPM | TEMPERATURE: 97.4 F | OXYGEN SATURATION: 97 % | DIASTOLIC BLOOD PRESSURE: 70 MMHG | SYSTOLIC BLOOD PRESSURE: 122 MMHG

## 2025-01-27 PROCEDURE — G0157 HHC PT ASSISTANT EA 15: HCPCS | Mod: CQ

## 2025-01-27 ASSESSMENT — ENCOUNTER SYMPTOMS
PAIN LOCATION - PAIN SEVERITY: 1/10
PAIN LOCATION - PAIN QUALITY: ACHING BURNING
LOWEST PAIN SEVERITY IN PAST 24 HOURS: 0/10
PAIN LOCATION: LEFT LEG
PAIN LOCATION - RELIEVING FACTORS: ICE REST
PAIN LOCATION - PAIN FREQUENCY: WITH ACTIVITY
SUBJECTIVE PAIN PROGRESSION: WAXING AND WANING
HIGHEST PAIN SEVERITY IN PAST 24 HOURS: 2/10
PAIN: 1
PAIN LOCATION - EXACERBATING FACTORS: ACTIVITY

## 2025-01-27 NOTE — PROGRESS NOTES
MORALES Blackmon, PAAzulC  Division of Adult Reconstruction  Phone: [phone]  Fax: [phone]          HPI:  Diagnosis: End stage osteoarthritis left hip  Procedure Performed: left Total Hip Arthroplasty   Date of Surgery: January 10th, 2025    Kelli Brito is a pleasant 64 y.o. year-old female here for regularly scheduled follow-up of their left total hip arthroplasty by Dr. Razo. The patient is approximately 2 weeks postop. The patient has no specific complaints other than occasional discomfort. The patient has no mechanical symptoms. The patient has no swelling and mild pain. The patient is using Tylenol and Tramadol PRN for pain control. The patient has been compliant with MAYURI compression stockings as prescribed. They have been working with home physical therapy and have not progressed to outpatient PT. They plan to start outpatient PT on 2/3. The patient is ambulatory with a rollator. The patient has been compliant with their prescribed ASA for VTE prophylaxis. The patient has had no interval fall or trauma. The patient's wound has healed uneventfully. The patient denies: fevers, chills, incisional drainage, joint instability, chest or calf pain, shortness of breath, and night sweats.  There are no complaints of numbness or tingling in the operative extremity. No complications postoperatively.        Review of systems  There has been no interval change in this patient's past medical, surgical, medications, allergies, family history or social history since the most recent visit to a provider within our department. 14 point review of systems was performed, reviewed, and negative except for pertinent positives documented in the history of present illness.     Past Medical History:   Diagnosis Date    Anxiety disorder, unspecified 10/27/2015    Anxiety    Endometrial cancer (Multi)     GERD (gastroesophageal reflux disease)     Hypercholesteremia     Hyperlipidemia     Hypertension      Hyperthyroidism 09/24    Obesity     Other seasonal allergic rhinitis 01/20/2015    Seasonal allergies    Personal history of irradiation     Personal history of malignant neoplasm, unspecified     History of malignant neoplasm    Personal history of other diseases of the circulatory system     History of hypertension    Personal history of other diseases of the digestive system     History of gastroesophageal reflux (GERD)    Personal history of other diseases of the musculoskeletal system and connective tissue     History of polymyalgia rheumatica    Personal history of other diseases of the nervous system and sense organs     History of sleep apnea    Personal history of urinary calculi     History of renal calculi    Sleep apnea     Thyroid nodule     Uterine cancer (Multi)      Patient Active Problem List   Diagnosis    Status post total hip replacement, right    Right hip pain    Abnormal mammogram    Allergic rhinitis    Anxiety    Back pain    Cholelithiases    Closed traumatic subluxation of head of right fibula    Degeneration of lumbosacral intervertebral disc    Displacement of lumbar intervertebral disc without myelopathy    Dry eyes, bilateral    Dry mouth    Elbow dislocation    Elbow pain    Elevated antinuclear antibody (JAUN) level    Endometrial adenocarcinoma (Multi)    Endometrial cancer determined by uterine biopsy (Multi)    Endometrial mass    GERD (gastroesophageal reflux disease)    Hyperlipidemia    Hypertension    Hypersomnia    Insomnia    Menopausal symptom    Muscle pain    Obesity, morbid (Multi)    Arthritis    Osteopenia    Polymyalgia rheumatica (Multi)    Pulmonary nodule    Postmenopausal bleeding    Referred pain    Seasonal allergies    Sinusitis    Skin sensation disturbance    Sleep apnea    Somatic dysfunction of sacral region    Tendonitis of shoulder, right    Thyroid nodule    Uterine cancer (Multi)    Vitamin D deficiency    Chronic left hip pain     Medication  Documentation Review Audit       Reviewed by Merle Singletary LPN (Licensed Nurse) on 01/28/25 at 1107      Medication Order Taking? Sig Documenting Provider Last Dose Status   acetaminophen (Tylenol) 500 mg tablet [id number] Yes Take 2 tablets (1,000 mg) by mouth every 8 hours for 20 days. Rob Razo MD  Active   aspirin 81 mg chewable tablet [id number] Yes Chew 1 tablet (81 mg) 2 times a day. Rob Razo MD  Active   atorvastatin (Lipitor) 20 mg tablet [id number] Yes TAKE 1 TABLET BY MOUTH EVERY DAY Jaxon Giraldo MD  Active   calcium carbonate (CALTRATE 600 ORAL) [id number] Yes Take 2 tablets by mouth once daily in the evening. Historical Provider, MD  Active     Discontinued 01/28/25 1107   methIMAzole (Tapazole) 5 mg tablet [id number] Yes Take 1 tablet (5 mg) by mouth once daily. Daniel Munson MD  Active   ondansetron (Zofran) 4 mg tablet [id number] Yes Take 1 tablet (4 mg) by mouth every 8 hours if needed for nausea or vomiting. Rob Razo MD  Active   pantoprazole (ProtoNix) 40 mg EC tablet [id number] Yes Take 1 tablet (40 mg) by mouth once daily in the morning. Take before meals. Do not crush, chew, or split. Rob Razo MD  Active   psyllium seed, with sugar, (METAMUCIL, SUGAR, ORAL) [id number] Yes Take 3 tablets by mouth once daily as needed. gummies Historical Provider, MD  Active     Discontinued 01/28/25 1107                  Allergies   Allergen Reactions    Azithromycin Nausea Only and Unknown    Biotene Dry Mouth Hives    Grass Pollen Itching    Loratadine Unknown    Mold Other    Saliva Stimulant Comb. No.3 Hives     biotin    Gabapentin Anxiety and Other     Social History     Socioeconomic History    Marital status:      Spouse name: Not on file    Number of children: Not on file    Years of education: Not on file    Highest education level: Not on file   Occupational History    Not on file   Tobacco Use    Smoking status: Never    Smokeless tobacco: Never    Vaping Use    Vaping status: Never Used   Substance and Sexual Activity    Alcohol use: Never    Drug use: Never    Sexual activity: Not on file   Other Topics Concern    Not on file   Social History Narrative    Not on file     Social Drivers of Health     Financial Resource Strain: Low Risk  (1/10/2025)    Overall Financial Resource Strain (CARDIA)     Difficulty of Paying Living Expenses: Not hard at all   Food Insecurity: No Food Insecurity (1/10/2025)    Hunger Vital Sign     Worried About Running Out of Food in the Last Year: Never true     Ran Out of Food in the Last Year: Never true   Transportation Needs: No Transportation Needs (1/13/2025)    OASIS : Transportation     Lack of Transportation (Medical): No     Lack of Transportation (Non-Medical): No     Patient Unable or Declines to Respond: No   Physical Activity: Not on file   Stress: Not on file   Social Connections: Feeling Socially Integrated (1/13/2025)    OASIS : Social Isolation     Frequency of experiencing loneliness or isolation: Never   Intimate Partner Violence: Not At Risk (1/10/2025)    Humiliation, Afraid, Rape, and Kick questionnaire     Fear of Current or Ex-Partner: No     Emotionally Abused: No     Physically Abused: No     Sexually Abused: No   Housing Stability: Low Risk  (1/10/2025)    Housing Stability Vital Sign     Unable to Pay for Housing in the Last Year: No     Number of Times Moved in the Last Year: 0     Homeless in the Last Year: No     Past Surgical History:   Procedure Laterality Date    HIP ARTHROPLASTY Right     HYSTERECTOMY      OTHER SURGICAL HISTORY  11/29/2022    Nose surgery    OTHER SURGICAL HISTORY  11/29/2022    Dilation and curettage    US GUIDED FINE PERCUTANEOUS ASPIRATION  12/08/2022    US GUIDED FINE PERCUTANEOUS ASPIRATION [facility]       Physical Exam:  General: Well-appearing female is alert and oriented x 3 and appears comfortable. NAD. Pleasant and cooperative.  Mood:  Euthymic  Respirations: Non-labored  Gait: Slight antalgic  Assistive Device: rollator. Coordination and balance intact.    left Hip  No other overlying lesion.  Wound: Incision is healing well with no signs of surrounding infection, erythema, fluctuance, dehiscence, drainage, or suture abscess. There is mild warmth and effusion about the hip, both consistent with the normal post-operative healing.   Range of motion: Surgical hip demonstrates painless free ROM through short arcs of motion.  Tenderness: None  Knee: Painless ROM of the knee.   Calf: No swelling, warmth, or tenderness. Lower extremity is well perfused.  Able to dorsi-flex and plantar-flex the ankle with appropriate strength. Able to wiggle all toes.   Neurovascular exam is at baseline.  The foot is warm and well perfused with palpable DP pulse.  Sensation is intact to light touch.     Imaging:  Radiographs of the operative hip were independently obtained and reviewed in clinic. The implant is well fixed and well aligned. No radiographic evidence of raheel-implant fracture, lucency or dislocation. Compared to immediate post-operative x-rays, no change in appearance. Leg lengths closely restored.    Assesment/Plan:  Kelli Brito is doing well and progressing well approximately 2 weeks s/p left total hip arthroplasty. I am satisfied with the patient's recovery to this point.     A thorough discussion was had with the patient concerning the postoperative course and the patient is in agreement with the plan.  I discussed with the patient activity precautions and and dislocation precautions in detail which include avoidance of hip flexion >90 degrees with simultaneous internal rotation, twisting, and heavy lifting. These hip precautions will remain in place for a total of 3 months. Patient can progress activities as tolerated. At this time, you may gradually increase your activities and get back to a normal, low-impact lifestyle. Patient should also  limit running, jumping, and repetitive activity. The patient verbalizes understanding of these precautions.  Continued home exercise program or outpatient PT, per protocol to improve strength and stamina. Progress off support as tolerated.   Continue with current pain regimen of Tylenol and Tramadol PRN. We discussed strategies to wean off of opioid medications as tolerated. Transition to tylenol. Can incorporate NSAIDs if they have no prior contraindications after completion of prophylactic anticoagulation.   No swimming or tub bathing until 3 months post op.  Do not visit dentist until 3 months after surgery unless it is an emergency. Reviewed the need for prophylactic antibiotics prior to any dental or other invasive procedures. Patient understands that their dentist or myself may prescribe.    All questions were answered.    Follow-up in 11 weeks with radiographs or sooner if there are any concerns.     MORALES Leiva, PAAzulC  Orthopedic Physician Assistant  Division of Adult Reconstruction  Department of Orthopaedics  [place] [phone]

## 2025-01-28 ENCOUNTER — OFFICE VISIT (OUTPATIENT)
Dept: ORTHOPEDIC SURGERY | Facility: CLINIC | Age: 65
End: 2025-01-28
Payer: MEDICARE

## 2025-01-28 DIAGNOSIS — Z96.642 S/P TOTAL LEFT HIP ARTHROPLASTY: ICD-10-CM

## 2025-01-28 DIAGNOSIS — Z96.652 S/P TOTAL KNEE ARTHROPLASTY, LEFT: Primary | ICD-10-CM

## 2025-01-28 PROCEDURE — 99211 OFF/OP EST MAY X REQ PHY/QHP: CPT

## 2025-01-28 PROCEDURE — 1125F AMNT PAIN NOTED PAIN PRSNT: CPT

## 2025-01-28 PROCEDURE — 1159F MED LIST DOCD IN RCRD: CPT

## 2025-01-28 PROCEDURE — 1123F ACP DISCUSS/DSCN MKR DOCD: CPT

## 2025-01-28 ASSESSMENT — PAIN - FUNCTIONAL ASSESSMENT: PAIN_FUNCTIONAL_ASSESSMENT: 0-10

## 2025-01-28 ASSESSMENT — PAIN SCALES - GENERAL: PAINLEVEL_OUTOF10: 2

## 2025-01-28 ASSESSMENT — PAIN DESCRIPTION - DESCRIPTORS: DESCRIPTORS: SORE

## 2025-01-29 ENCOUNTER — TELEPHONE (OUTPATIENT)
Dept: OTOLARYNGOLOGY | Facility: CLINIC | Age: 65
End: 2025-01-29
Payer: MEDICARE

## 2025-01-29 DIAGNOSIS — E04.1 THYROID NODULE: Primary | ICD-10-CM

## 2025-01-29 NOTE — TELEPHONE ENCOUNTER
Patient called for thyroid US order for 2025. LOV 2/2024 with hx 2 nondiagnostic FNA x 2 and serial US was recommended. Please sign order.

## 2025-01-29 NOTE — TELEPHONE ENCOUNTER
Patient aware order is in chart. She is recovering from hip replacement and will schedule later in February and fu as needed.

## 2025-01-30 ENCOUNTER — HOME CARE VISIT (OUTPATIENT)
Dept: HOME HEALTH SERVICES | Facility: HOME HEALTH | Age: 65
End: 2025-01-30
Payer: MEDICARE

## 2025-01-30 ASSESSMENT — ACTIVITIES OF DAILY LIVING (ADL)
HOME_HEALTH_OASIS: 00
OASIS_M1830: 00

## 2025-01-30 ASSESSMENT — ENCOUNTER SYMPTOMS
PERSON REPORTING PAIN: PATIENT
DENIES PAIN: 1

## 2025-01-30 NOTE — CASE COMMUNICATION
Patient discharged from PT and home health services transitioning to op at [facility] on Monday.

## 2025-02-03 ENCOUNTER — EVALUATION (OUTPATIENT)
Dept: PHYSICAL THERAPY | Facility: HOSPITAL | Age: 65
End: 2025-02-03
Payer: MEDICARE

## 2025-02-03 DIAGNOSIS — M25.552 ACUTE POSTOPERATIVE PAIN OF LEFT HIP: Primary | ICD-10-CM

## 2025-02-03 DIAGNOSIS — M16.12 PRIMARY OSTEOARTHRITIS OF LEFT HIP: ICD-10-CM

## 2025-02-03 DIAGNOSIS — G89.18 ACUTE POSTOPERATIVE PAIN OF LEFT HIP: Primary | ICD-10-CM

## 2025-02-03 PROCEDURE — 97161 PT EVAL LOW COMPLEX 20 MIN: CPT | Mod: GP | Performed by: PHYSICAL THERAPIST

## 2025-02-03 PROCEDURE — 97110 THERAPEUTIC EXERCISES: CPT | Mod: GP | Performed by: PHYSICAL THERAPIST

## 2025-02-03 ASSESSMENT — ENCOUNTER SYMPTOMS
OCCASIONAL FEELINGS OF UNSTEADINESS: 0
DEPRESSION: 0
LOSS OF SENSATION IN FEET: 0

## 2025-02-03 ASSESSMENT — PAIN - FUNCTIONAL ASSESSMENT: PAIN_FUNCTIONAL_ASSESSMENT: 0-10

## 2025-02-03 ASSESSMENT — PAIN SCALES - GENERAL: PAINLEVEL_OUTOF10: 0 - NO PAIN

## 2025-02-03 NOTE — PROGRESS NOTES
"  Physical Therapy  Physical Therapy Orthopedic Evaluation and Treatment    Patient Name: Kelli Brito \"Cecelia\"  MRN: [id number]  Today's Date: 2/3/2025  Time Calculation  Start Time: 1053  Stop Time: 1131  Time Calculation (min): 38 min    Today's Charges  PT Evaluation Time Entry  PT Evaluation (Low) Time Entry: 25  PT Therapeutic Procedures Time Entry  Therapeutic Exercise Time Entry: 13                   Insurance:  Visit number: 1 of 1  Authorization info: auth after evaluation  Payor: [employer] / Plan: [employer] / Product Type: *No Product type* /     Current Problem  Problem List Items Addressed This Visit             ICD-10-CM    Acute postoperative pain of left hip - Primary G89.18, M25.552    Relevant Orders    Follow Up In Physical Therapy     Other Visit Diagnoses         Codes    Primary osteoarthritis of left hip     M16.12    Relevant Orders    Follow Up In Physical Therapy          1. Acute postoperative pain of left hip  Follow Up In Physical Therapy      2. Primary osteoarthritis of left hip  Referral to Physical Therapy    Follow Up In Physical Therapy          General:  General  Reason for Referral: L posterior HANSEL (1/10/2025)  Referred By: Dr. Razo      Precautions:   Precautions  LE Weight Bearing Status: Weight Bearing as Tolerated  Post-Surgical Precautions: Left hip precautions  Precautions Comment: posterior hip precautions    Medical History Form: Reviewed (scanned into chart)    Subjective:   Subjective   Chief Complaint: Patient is a 65 year old female who presents to clinic status post left total hip arthroplasty on 1/10/2025. 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  Onset Date: 1/10/2025  CHERISE: surgery    Current " Condition:   Better    Pain:  Pain Assessment: 0-10  0-10 (Numeric) Pain Score: 0 - No pain  Highest: 1/10 pain  Lowest: 0/10 pain  Aggravating Factors:  Standing  Relieving Factors:  Rest and Ice    Relevant Information (PMH & Previous Tests/Imaging):   Pelvis xray 1/10/2025 (post-op):  FINDINGS:  Endoprosthesis insertion was completed in the left hip. Articular and soft tissue air is present from the surgery. Pre-existing right hip endoprosthesis is unchanged. Iliac crests are excluded from field of view. The visible osseous structures show no evidence of acute perioperative fracture.    Previous Interventions/Treatments: Physical Therapy    Prior Level of Function (PLOF)  Patient previously independent with all ADLs  Exercise/Physical Activity: home exercise program from home health physical therapy   Work/School: retired  Hobbies: not stated    Patients Living Environment: Reviewed and no concern    Primary Language: English    Patient's Goal(s) for Therapy: Be able to get in/out of a lower sports car.    Red Flags: Do you have any of the following? No  Fever/chills, unexplained weight changes, dizziness/fainting, unexplained change in bowel or bladder functions, unexplained malaise or muscle weakness, night pain/sweats, numbness or tingling    Objective:  Objective     HIP    Functional Rating Scale  LEFS   /80: 40    Hip AROM  Hip AROM WFL: yes (within posterior precautions)  R hip flexion: (125°): WFL  L hip flexion: (125°): limited by precautions  R hip abduction: (45°): WFL  L hip abduction: (45°): WFL  R hip ER: (45°): WFL  L hip ER: (45°): WFL  R hip IR: (45°): WFL  L hip IR: (45°): limited by precautions    Specific Lower Extremity MMT  R Iliopsoas: (5/5): 5/5  L Iliopsoas: (5/5): at least 3+/5  L Gluteals (prone): (5/5): not tested  L Gluteals (sidelying): (5/5): not tested  L Hip External Rotation: (5/5): not tested    Knee AROM  Knee AROM WFL: yes    Knee MMT  R knee flexion: (5/5): 5/5  L knee  "flexion: (5/5): 5/5  R knee extension: (5/5): 5/5  L knee extension: (5/5): 5/5      Functional Screening    Posture: shoulder rounded forward    Lower Extremity Functional Movements  Transfers: Modified Independent  Gait: antalgic gait with step through reciprocal gait   Assistive Device: cane  DL Squat: not tested   Balance  Feet Together: 30 seconds  Tandem: not tested       Special Tests    Not tested due to recent surgery.    Outcome Measures:  Other Measures  Lower Extremity Funtional Score (LEFS): 40/80     Treatment Performed:  Therapeutic Exercise  Therapeutic Exercise Performed: Yes  Therapeutic Exercise Activity 1: standing hip abd x20  Therapeutic Exercise Activity 2: standing marching x20  Therapeutic Exercise Activity 3: step ups fwd 4\" x10  Therapeutic Exercise Activity 4: mini squats x20  Therapeutic Exercise Activity 5: standing knee flexion x20  Therapeutic Exercise Activity 6: standing hip ext x20  Therapeutic Exercise Activity 7: sidestepping 6'x4  Therapeutic Exercise Activity 8: zig zags fwd/bwd 6'x4    Ambulation/Gait Training 1  Surface 1: Level tile  Device 1: Single point cane  Assistance 1: Close supervision, Distant supervision  Quality of Gait 1: Diminished heel strike, Decreased step length, Antalgic  Comments/Distance (ft) 1: 30' in gym, step through gait pattern with antalgia    EDUCATION:   Individual(s) Educated: patient   Education Provided: Home exercise program, plan of care, activity modifications, pain management, and injury pathology  Handout(s) Provided: Scanned into chart  Home Program: Reviewed home exercise program from home health physical therapy, patient to continue with home exercise program.  Risk and Benefits Discussed with Patient/Caregiver/Other: Yes   Patient/Caregiver Demonstrated Understanding: Yes   Plan of Care Discussed and Agreed Upon: Yes   Patient Response to Education: Patient/Caregiver verbalized understanding of information, Patient/Caregiver performed " "return demonstration of exercises/activities, and Patient/Caregiver asked appropriate questions    Assessment: Patient presents status post posterior left total hip arthroplasty resulting in limited participation in pain-free ADLs and inability to perform at their prior level of function. Patient demonstrated slightly impaired gait pattern. Patient demonstrated good ability to verbalize and follow posterior hip precautions. Patient demonstrated good tolerance to exercises including step ups on 4\" step without increased hip pain. Patient is highly motivated to progress and gain her independence back. Skilled PT warranted to address the above stated impairments, so the patient can perform FA's without increased pain or difficulty.    PT Assessment Results: Decreased strength, Decreased range of motion, Impaired balance, Decreased mobility, Orthopedic restrictions, Pain  Rehab Prognosis: Good  Evaluation/Treatment Tolerance: Patient tolerated treatment well    Complexity: low    Plan:  Treatment/Interventions: Education/ Instruction, Gait training, Manual therapy, Neuromuscular re-education, Therapeutic activities, Therapeutic exercises  PT Plan: Skilled PT  PT Frequency: 1 time per week  Duration: 6 weeks  Onset Date: 01/10/25  Certification Period Start Date: 02/03/25  Certification Period End Date: 03/17/25  Number of Treatments Authorized: 1 of 1  Rehab Potential: Good  Plan of Care Agreement: Patient    Goals: Set and discussed today  Active       PT Problem       PT Goal 1       Start:  02/03/25    Expected End:  03/17/25            PT Goal 2       Start:  02/03/25    Expected End:  03/17/25            Patient will maintain Tandem stance for at least 30 sec with no upper extremity support to decrease risk of falling.       Start:  02/03/25    Expected End:  03/17/25            Patient will ambulate community distance using no device with independent.       Start:  02/03/25    Expected End:  03/17/25            " Patient will ascend and descend 1 flight of stairs with rail modified independent and reciprocal pattern.       Start:  02/03/25    Expected End:  03/17/25            Patient will achieve bilateral hip flexion strength of at least 4+/5       Start:  02/03/25    Expected End:  03/17/25            Patient will achieve bilateral hip abduction strength of at least 4+/5       Start:  02/03/25    Expected End:  03/17/25            Patient will demonstrate independence in home program for support of progression       Start:  02/03/25    Expected End:  03/17/25            Patient will show a significant change in LEFS (40 to 49) patient reported outcome tool to demonstrate subjective imporovement       Start:  02/03/25    Expected End:  03/17/25                Plan of care was developed with input and agreement by the patient    Ambulatory Screenings Summary       Screening  Frequency  Date Last Completed   Spiritual and Cultural Beliefs   Screening  each visit or episode of care 11/25/2024   Falls Risk Screening  every ambulatory visit 2/3/2025 11:03 AM   Pain Screening  annually at primary care visit  11/25/2024   Domestic Violence screening  annually at primary care visit 11/25/2024   Elder Abuse Screening  annually at primary care visit    Depression Screening  annually in the primary care setting 1/10/2025   Suicide Risk Screening  annually in the primary care setting 1/10/2025   Nutrition and Food Insecurity   Screening  at least annually at primary care visit  1/10/2025   Key Learner  annually in the primary care setting 11/25/2024   Drug Screen  1/10/2025  7:26 PM   Alcohol Screen  1/10/2025  7:26 PM   Advance Directive  11/25/2024         Brian Devi, PT

## 2025-02-13 ENCOUNTER — TELEPHONE (OUTPATIENT)
Dept: PHYSICAL THERAPY | Facility: HOSPITAL | Age: 65
End: 2025-02-13
Payer: MEDICARE

## 2025-02-13 NOTE — TELEPHONE ENCOUNTER
Patient wants to hold PT at this time due to the high copay. Informed patient we would hold chart for 30 days, if we do not hear from her, we will discharge.

## 2025-02-20 ENCOUNTER — APPOINTMENT (OUTPATIENT)
Dept: ORTHOPEDIC SURGERY | Facility: CLINIC | Age: 65
End: 2025-02-20
Payer: MEDICARE

## 2025-02-25 ENCOUNTER — APPOINTMENT (OUTPATIENT)
Dept: ORTHOPEDIC SURGERY | Facility: CLINIC | Age: 65
End: 2025-02-25
Payer: MEDICARE

## 2025-02-28 ENCOUNTER — HOSPITAL ENCOUNTER (OUTPATIENT)
Dept: RADIOLOGY | Facility: HOSPITAL | Age: 65
Discharge: HOME | End: 2025-02-28
Payer: MEDICARE

## 2025-02-28 DIAGNOSIS — E04.1 THYROID NODULE: ICD-10-CM

## 2025-02-28 PROCEDURE — 76536 US EXAM OF HEAD AND NECK: CPT

## 2025-03-28 NOTE — PROGRESS NOTES
HPI   64 yo hx of PMR, anxiety, dyslipdimia, hx of endometrial cancer (surgery/radn) and lung nodules, here in follow up for Graves' disease (dx in 11/24 and started methimazole).     Thyroid:  2023: tsh-wnl  -no biotin supplement  8/24: tsh <0.01, ft4-1.73, tgab/tpo ab's negative   10/24: tsh <0.01, ft4-2.5  11/24 +tsh rec ab/+TSI, started methimazole 10mg  1/25: tsh-0.03/ft4-0.55, ft3-wnl, lowered to methimazole 5mg qd          Thyroid ultrasound 1/2024:  R 0.4cm TR5 (similar 2022)  R 1.5cm nodule tr4 (smaller that 2022)-non diagnostic FNA X 2  L 1.6cm nodule, tr3 (slightly larger than 2022)     -pt repeated ultrasound in 2025 with ENT, see epic imaging    Symptoms:  -pt was having palpitations, sweats, feel warm, brain fog, lower energy, now on therapy euthroid  -no obstructive sx  -occ eye sx    -taking 5mg methimazole, pt forgot to have repeat labs      Current Outpatient Medications:     atorvastatin (Lipitor) 20 mg tablet, TAKE 1 TABLET BY MOUTH EVERY DAY, Disp: 90 tablet, Rfl: 2    BABY ASPIRIN ORAL, Take by mouth., Disp: , Rfl:     calcium carbonate (CALTRATE 600 ORAL), Take 2 tablets by mouth once daily in the evening., Disp: , Rfl:     methIMAzole (Tapazole) 5 mg tablet, Take 1 tablet (5 mg) by mouth once daily., Disp: 30 tablet, Rfl: 4    psyllium seed, with sugar, (METAMUCIL, SUGAR, ORAL), Take 3 tablets by mouth once daily as needed. gummies, Disp: , Rfl:     ondansetron (Zofran) 4 mg tablet, Take 1 tablet (4 mg) by mouth every 8 hours if needed for nausea or vomiting. (Patient not taking: Reported on 3/31/2025), Disp: 20 tablet, Rfl: 0    pantoprazole (ProtoNix) 40 mg EC tablet, Take 1 tablet (40 mg) by mouth once daily in the morning. Take before meals. Do not crush, chew, or split., Disp: 30 tablet, Rfl: 0      Allergies as of 03/31/2025 - Reviewed 03/31/2025   Allergen Reaction Noted    Azithromycin Nausea Only and Unknown 10/19/2023    Biotene dry mouth Hives 10/19/2023    Grass pollen Itching  "11/18/2024    Loratadine Unknown 10/19/2023    Mold Other 11/18/2024    Saliva stimulant comb. no.3 Hives 10/19/2023    Gabapentin Anxiety and Other 10/19/2023         Review of Systems   Cardiology: Lightheadedness-denies.  Chest pain-denies.  Leg edema-denies.  Palpitations-denies.  Respiratory: Cough-denies. Shortness of breath-denies.  Wheezing-denies.  Gastroenterology: Constipation-denies.  Diarrhea-denies.  Heartburn-denies.  Endocrinology: Cold intolerance-denies.  Heat intolerance-denies.  Sweats-denies.  Neurology: Headache-denies.  Tremor-denies.  Neuropathy in extremities-denies.  Psychology: Low energy-denies.  Irritability-denies.  Sleep disturbances-denies.      /90 (BP Location: Left arm, Patient Position: Sitting)   Pulse 89   Ht 1.676 m (5' 6\")   Wt 107 kg (236 lb 12.8 oz)   BMI 38.22 kg/m²       Labs:  Lab Results   Component Value Date    WBC 11.8 (H) 01/11/2025    NRBC  01/11/2025      Comment:      Not Measured    RBC 4.27 01/11/2025    HGB 12.3 01/11/2025    HCT 38.0 01/11/2025     01/11/2025     Lab Results   Component Value Date    CALCIUM 9.7 01/11/2025    AST 18 12/18/2024    ALKPHOS 111 12/18/2024    BILITOT 0.5 12/18/2024    PROT 7.6 12/18/2024    ALBUMIN 4.1 12/18/2024    GLOB 3.1 10/12/2022    AGR 1.4 (L) 10/12/2022     01/11/2025    K 4.6 01/11/2025     01/11/2025    CO2 23 01/11/2025    ANIONGAP 13 01/11/2025    BUN 27 (H) 01/11/2025    CREATININE 0.84 01/11/2025    UREACREAUR 22.5 (H) 09/26/2023    GLUCOSE 131 (H) 01/11/2025    ALT 16 12/18/2024    EGFR 77 01/11/2025     Lab Results   Component Value Date    CHOL 113 08/20/2024    TRIG 114 08/20/2024    HDL 40.1 08/20/2024    LDLCALC 50 08/20/2024     No results found for: \"MICROALBCREA\"  Lab Results   Component Value Date    TSH 0.03 (L) 01/02/2025     Lab Results   Component Value Date    QLXPAYAS08 349 04/10/2018     Lab Results   Component Value Date    HGBA1C 5.6 12/18/2024         Physical Exam "   General Appearance: pleasant, cooperative, no acute distress  HEENT: no chemosis, no proptosis, no lid lag, no lid retraction  Neck: no lymphadenopathy, no thyromegaly, no dominant thyroid nodules  Heart: no murmurs, regular rate and rhythm, S1 and S2  Lungs: no wheezes, no rhonci, no rales  Extremities: no lower extremity swelling      Assessment/Plan   1. Hyperthyroidism (Primary)  -Graves' disease, pt prefers trial of methimazole  -on 5mg repeat labs now and  in 3 months and follow up in 6 months (all labs ordered)    2. Nodular thyroid disease  -follow with exams/scans at decreasing intervals         Follow Up:  Arpit 6 months    -labs/tests/notes reviewed  -reviewed and counseled patient on medication monitoring and side effects

## 2025-03-31 ENCOUNTER — APPOINTMENT (OUTPATIENT)
Dept: ENDOCRINOLOGY | Facility: CLINIC | Age: 65
End: 2025-03-31
Payer: MEDICARE

## 2025-03-31 VITALS
SYSTOLIC BLOOD PRESSURE: 129 MMHG | WEIGHT: 236.8 LBS | DIASTOLIC BLOOD PRESSURE: 90 MMHG | HEART RATE: 89 BPM | BODY MASS INDEX: 38.06 KG/M2 | HEIGHT: 66 IN

## 2025-03-31 DIAGNOSIS — E04.1 NODULAR THYROID DISEASE: ICD-10-CM

## 2025-03-31 DIAGNOSIS — E05.90 HYPERTHYROIDISM: Primary | ICD-10-CM

## 2025-03-31 PROCEDURE — 3080F DIAST BP >= 90 MM HG: CPT | Performed by: INTERNAL MEDICINE

## 2025-03-31 PROCEDURE — 1126F AMNT PAIN NOTED NONE PRSNT: CPT | Performed by: INTERNAL MEDICINE

## 2025-03-31 PROCEDURE — 1159F MED LIST DOCD IN RCRD: CPT | Performed by: INTERNAL MEDICINE

## 2025-03-31 PROCEDURE — 3008F BODY MASS INDEX DOCD: CPT | Performed by: INTERNAL MEDICINE

## 2025-03-31 PROCEDURE — 1036F TOBACCO NON-USER: CPT | Performed by: INTERNAL MEDICINE

## 2025-03-31 PROCEDURE — 3074F SYST BP LT 130 MM HG: CPT | Performed by: INTERNAL MEDICINE

## 2025-03-31 PROCEDURE — 99213 OFFICE O/P EST LOW 20 MIN: CPT | Performed by: INTERNAL MEDICINE

## 2025-03-31 PROCEDURE — 1123F ACP DISCUSS/DSCN MKR DOCD: CPT | Performed by: INTERNAL MEDICINE

## 2025-03-31 ASSESSMENT — PATIENT HEALTH QUESTIONNAIRE - PHQ9
1. LITTLE INTEREST OR PLEASURE IN DOING THINGS: NOT AT ALL
2. FEELING DOWN, DEPRESSED OR HOPELESS: NOT AT ALL
SUM OF ALL RESPONSES TO PHQ9 QUESTIONS 1 & 2: 0

## 2025-03-31 ASSESSMENT — ENCOUNTER SYMPTOMS
OCCASIONAL FEELINGS OF UNSTEADINESS: 0
DEPRESSION: 0
LOSS OF SENSATION IN FEET: 0

## 2025-03-31 ASSESSMENT — PAIN SCALES - GENERAL: PAINLEVEL_OUTOF10: 0-NO PAIN

## 2025-04-01 LAB
T3FREE SERPL-MCNC: 3.6 PG/ML (ref 2.3–4.2)
T4 FREE SERPL-MCNC: 1 NG/DL (ref 0.8–1.8)
TSH SERPL-ACNC: 0.01 MIU/L (ref 0.4–4.5)

## 2025-04-06 NOTE — PROGRESS NOTES
MORALES Blackmon, PAAzulC  Division of Adult Reconstruction  Phone: [phone]  Fax: [phone]        HPI:  Diagnosis: End stage osteoarthritis left hip  Procedure Performed: left Total Hip Arthroplasty   Date of Surgery: January 10th, 2025      Kelli Brito is a pleasant 65 y.o. year-old female here for regularly scheduled follow-up of their left total hip arthroplasty by Dr. Razo. The patient is approximately 3 months postop. The patient has no specific complaints other than occasional discomfort and soreness. The patient has no mechanical symptoms. The patient has no swelling and mild pain. The patient is using Ibuprofen for pain control. They have completed outpatient PT. The patient is ambulatory without an assistive device. The patient has had no interval fall or trauma. The patient's wound has healed uneventfully. The patient denies: fevers, chills, incisional drainage, joint instability, chest or calf pain, shortness of breath, and night sweats.  There are no complaints of numbness or tingling in the operative extremity. No complications postoperatively.      Pt does have known OA of the right knee. She says she got an IA CSI in 2023 that provided her with great relief. She states this knee has been bothering her more lately. The patient notes persistent pain as well as some occasional mechanical symptoms. Pain level: 6/10. She has not had any interval fall or trauma. Pain is mostly located anterior. The Pt has elected to undergo injection today.     Review of systems  There has been no interval change in this patient's past medical, surgical, medications, allergies, family history or social history since the most recent visit to a provider within our department. 14 point review of systems was performed, reviewed, and negative except for pertinent positives documented in the history of present illness.    Past Medical History:   Diagnosis Date    Anxiety disorder, unspecified  10/27/2015    Anxiety    Endometrial cancer (Multi)     GERD (gastroesophageal reflux disease)     Hypercholesteremia     Hyperlipidemia     Hypertension     Hyperthyroidism 09/24    Obesity     Other seasonal allergic rhinitis 01/20/2015    Seasonal allergies    Personal history of irradiation     Personal history of malignant neoplasm, unspecified     History of malignant neoplasm    Personal history of other diseases of the circulatory system     History of hypertension    Personal history of other diseases of the digestive system     History of gastroesophageal reflux (GERD)    Personal history of other diseases of the musculoskeletal system and connective tissue     History of polymyalgia rheumatica    Personal history of other diseases of the nervous system and sense organs     History of sleep apnea    Personal history of urinary calculi     History of renal calculi    Sleep apnea     Thyroid nodule     Uterine cancer (Multi)      Patient Active Problem List   Diagnosis    Status post total hip replacement, right    Right hip pain    Abnormal mammogram    Allergic rhinitis    Anxiety    Back pain    Cholelithiases    Closed traumatic subluxation of head of right fibula    Degeneration of lumbosacral intervertebral disc    Displacement of lumbar intervertebral disc without myelopathy    Dry eyes, bilateral    Dry mouth    Elbow dislocation    Elbow pain    Elevated antinuclear antibody (JAUN) level    Endometrial adenocarcinoma (Multi)    Endometrial cancer determined by uterine biopsy (Multi)    Endometrial mass    GERD (gastroesophageal reflux disease)    Hyperlipidemia    Hypertension    Hypersomnia    Insomnia    Menopausal symptom    Muscle pain    Obesity, morbid (Multi)    Arthritis    Osteopenia    Polymyalgia rheumatica (Multi)    Pulmonary nodule    Postmenopausal bleeding    Referred pain    Seasonal allergies    Sinusitis    Skin sensation disturbance    Sleep apnea    Somatic dysfunction of sacral  region    Tendonitis of shoulder, right    Thyroid nodule    Uterine cancer (Multi)    Vitamin D deficiency    Chronic left hip pain    Acute postoperative pain of left hip     Medication Documentation Review Audit       Reviewed by Deepa Peace MA (Medical Assistant) on 25 at 1453      Medication Order Taking? Sig Documenting Provider Last Dose Status   atorvastatin (Lipitor) 20 mg tablet [id number] Yes TAKE 1 TABLET BY MOUTH EVERY DAY Jaxon Giraldo MD  Active   BABY ASPIRIN ORAL [id number] Yes Take by mouth. Historical Provider, MD  Active   calcium carbonate (CALTRATE 600 ORAL) [id number] Yes Take 2 tablets by mouth once daily in the evening. Historical Provider, MD  Active   methIMAzole (Tapazole) 5 mg tablet [id number] Yes Take 1 tablet (5 mg) by mouth once daily. Daniel Munson MD  Active   ondansetron (Zofran) 4 mg tablet [id number]  Take 1 tablet (4 mg) by mouth every 8 hours if needed for nausea or vomiting.   Patient not taking: Reported on 3/31/2025    Rob Razo MD  Active   pantoprazole (ProtoNix) 40 mg EC tablet [id number]  Take 1 tablet (40 mg) by mouth once daily in the morning. Take before meals. Do not crush, chew, or split. Rob Razo MD   25 2359   psyllium seed, with sugar, (METAMUCIL, SUGAR, ORAL) [id number] Yes Take 3 tablets by mouth once daily as needed. gummies Historical Provider, MD  Active                  Allergies   Allergen Reactions    Azithromycin Nausea Only and Unknown    Biotene Dry Mouth Hives    Grass Pollen Itching    Loratadine Unknown    Mold Other    Saliva Stimulant Comb. No.3 Hives     biotin    Gabapentin Anxiety and Other     Social History     Socioeconomic History    Marital status:      Spouse name: Not on file    Number of children: Not on file    Years of education: Not on file    Highest education level: Not on file   Occupational History    Not on file   Tobacco Use    Smoking status: Never    Smokeless tobacco:  Never   Vaping Use    Vaping status: Never Used   Substance and Sexual Activity    Alcohol use: Never    Drug use: Never    Sexual activity: Defer   Other Topics Concern    Not on file   Social History Narrative    Not on file     Social Drivers of Health     Financial Resource Strain: Low Risk  (1/10/2025)    Overall Financial Resource Strain (CARDIA)     Difficulty of Paying Living Expenses: Not hard at all   Food Insecurity: No Food Insecurity (1/10/2025)    Hunger Vital Sign     Worried About Running Out of Food in the Last Year: Never true     Ran Out of Food in the Last Year: Never true   Transportation Needs: No Transportation Needs (1/30/2025)    OASIS : Transportation     Lack of Transportation (Medical): No     Lack of Transportation (Non-Medical): No     Patient Unable or Declines to Respond: No   Physical Activity: Not on file   Stress: Not on file   Social Connections: Feeling Socially Integrated (1/30/2025)    OASIS : Social Isolation     Frequency of experiencing loneliness or isolation: Never   Intimate Partner Violence: Not At Risk (1/10/2025)    Humiliation, Afraid, Rape, and Kick questionnaire     Fear of Current or Ex-Partner: No     Emotionally Abused: No     Physically Abused: No     Sexually Abused: No   Housing Stability: Low Risk  (1/10/2025)    Housing Stability Vital Sign     Unable to Pay for Housing in the Last Year: No     Number of Times Moved in the Last Year: 0     Homeless in the Last Year: No     Past Surgical History:   Procedure Laterality Date    HIP ARTHROPLASTY Right     HYSTERECTOMY      OTHER SURGICAL HISTORY  11/29/2022    Nose surgery    OTHER SURGICAL HISTORY  11/29/2022    Dilation and curettage    US GUIDED FINE PERCUTANEOUS ASPIRATION  12/08/2022    US GUIDED FINE PERCUTANEOUS ASPIRATION [facility]       Physical Exam:  General: Well-appearing female is alert and oriented x 3 and appears comfortable. NAD. Pleasant and cooperative.  Mood:  Euthymic  Respirations: Non-labored  Gait: Slight Antalgic   Assistive Device: none. Coordination and balance intact.    left Hip  No other overlying lesion.  Wound: Incision is well healed with no signs of surrounding infection, erythema, fluctuance, dehiscence, drainage, or suture abscess. There is mild warmth and effusion about the hip, both consistent with the normal post-operative healing.   Range of motion: Surgical hip demonstrates painless free ROM through short arcs of motion.  Tenderness: None  Knee: Painless ROM of the knee.   Calf: No swelling, warmth, or tenderness. Lower extremity is well perfused.  Able to dorsi-flex and plantar-flex the ankle with appropriate strength. Able to wiggle all toes.   Neurovascular exam is at baseline.  The foot is warm and well perfused with palpable DP pulse.  Sensation is intact to light touch.     Affected side: right knee:  Limb Alignment: neutral  Tenderness to palpation over medial, joint line.  Effusion: none  ROM: 0 - 105  Stable to varus and valgus stress at full extension and 30 degrees of flexion.  Quad Strength: 5/5  Hamstring Strength: 5/5  Patella Crepitus: None  Patella Grind: Negative  Pulses: Palpable DP pulse  Sensation: Intact to light touch distally  Motor function: Able to fire TA, EHL, G/S  Compartments supple and non-tender.  Extremities warm and well perfused.    Imaging:  Radiographs of the operative hip were independently obtained and reviewed in clinic. The implant is well fixed and well aligned. No radiographic evidence of raheel-implant fracture, lucency or dislocation. Compared to immediate post-operative x-rays, no change in appearance. Leg lengths closely restored.    Last imaging of the right knee from 9/19/23 revealed moderate tricompartmental OA of the right knee.    Assesment/Plan:  Kelli Brito is doing well and progressing well approximately 3 months s/p left total hip arthroplasty. I am satisfied with the patient's recovery to  this point.     A thorough discussion was had with the patient concerning the postoperative course and the patient is in agreement with the plan.  Today, hip precautions were lifted. Patient can progress activities as tolerated. You may increase your activities and get back to a normal, low-impact lifestyle. In office demonstration was provided on how to pick items off the floor while in a seated or standing position to avoid posterior dislocation. Patient should also limit running, jumping, weight lifting and repetitive activity. The patient verbalizes understanding of these precautions.  Continued home exercise program or outpatient PT, per protocol to improve strength and stamina. Continue with current pain regimen of OTC medications PRN.   Reviewed the need for prophylactic antibiotics prior to any dental or other invasive procedures. Patient understands that their dentist or myself may prescribe. This will continue until the pt is 2 years post op.    She also has known OA of the right knee for which I injected today with an IA corticosteroid.    We discussed conservative treatment options. The patient is still responding well to intra-articular injections. I do not see a clear indication to move forward with arthroplasty. I do believe that at some point in the future, patient will benefit from a total knee arthroplasty but we can continue with conservative treatments until such time.     Patient was instructed to avoid physical activity for 24-48 hours to prevent the knees from swelling and may ice the knees as tolerated. Patient should avoid tub bathing or swimming for 48 hours. Patient should contact the office if any signs of of infection appear: redness, fever, chills, drainage, swelling or warmth to the knees. Pt understands that the injections can be repeated no sooner than 3 months.     L Inj/Asp: R knee on 4/8/2025 4:48 PM  Indications: pain and joint swelling  Details: 22 G needle, anterolateral  approach  Medications: 1 mL triamcinolone acetonide 40 mg/mL; 4 mL lidocaine 10 mg/mL (1 %)    Discussion:  I discussed the conservative treatment options for knee osteoarthritis including but not limited to physical therapy, oral NSAIDS, activity and lifestyle modification, and corticosteroid injections. Pt has elected to undergo a cortisone injection today. I have explained the risk and benefits of an injection including the possibility of joint infection, bleeding, damage to cartilage, allergic reaction. Patient verbalized understanding and gave verbal consent wishes to proceed with a intra-articular cortisone injection for their knee.    Procedure:  After discussing the risk and benefits of the procedure, we proceeded with an intra-articular right knee injection.    With the patient's informed verbal consent, the right knee was prepped in standard sterile fashion with Chlorhexidine. The skin was then anesthetized with ethyl chloride spray and cleaned again with Chlorhexidine. The knee was then apirated/injected with a prefilled 20-gauge syringe of 40 mg Kenalog + 4 ml Lidocaine using the lateral approach without complications.  The patient tolerated this well and felt immediate initial relief of symptoms. A bandaid was applied and the patient ambulated out of the clinic on ther own accord without difficulty. Patient was instructed to avoid physical activity for 24-48 hours to prevent the knees from swelling and may ice the knees as tolerated. Patient should contact the office if any signs of of infection appear: redness, fever, chills, drainage, swelling or warmth to the knees.  Pt understands that the injections can be repeated no sooner than 3 months.    Procedure, treatment alternatives, risks and benefits explained, specific risks discussed. Consent was given by the patient. Immediately prior to procedure a time out was called to verify the correct patient, procedure, equipment, support staff and site/side  marked as required. Patient was prepped and draped in the usual sterile fashion.         All questions were answered.    Follow-up in 9 months for 1 year post op visit of the left hip with radiographs or sooner if there are any concerns.     Follow up for right knee exam with right knee XR to see how the IA CSI worked in 4 months.    MORALES Leiva, PAAzulC  Orthopedic Physician Assistant  Division of Adult Reconstruction  Department of Orthopaedics  [place] [id number]

## 2025-04-08 ENCOUNTER — HOSPITAL ENCOUNTER (OUTPATIENT)
Dept: RADIOLOGY | Facility: CLINIC | Age: 65
Discharge: HOME | End: 2025-04-08
Payer: MEDICARE

## 2025-04-08 ENCOUNTER — OFFICE VISIT (OUTPATIENT)
Dept: ORTHOPEDIC SURGERY | Facility: CLINIC | Age: 65
End: 2025-04-08
Payer: MEDICARE

## 2025-04-08 DIAGNOSIS — Z96.642 S/P TOTAL LEFT HIP ARTHROPLASTY: Primary | ICD-10-CM

## 2025-04-08 DIAGNOSIS — Z96.642 S/P TOTAL LEFT HIP ARTHROPLASTY: ICD-10-CM

## 2025-04-08 DIAGNOSIS — M17.11 PRIMARY OSTEOARTHRITIS OF RIGHT KNEE: ICD-10-CM

## 2025-04-08 PROCEDURE — 99214 OFFICE O/P EST MOD 30 MIN: CPT | Mod: 25

## 2025-04-08 PROCEDURE — 1123F ACP DISCUSS/DSCN MKR DOCD: CPT

## 2025-04-08 PROCEDURE — 73502 X-RAY EXAM HIP UNI 2-3 VIEWS: CPT | Mod: LT

## 2025-04-08 PROCEDURE — 2500000004 HC RX 250 GENERAL PHARMACY W/ HCPCS (ALT 636 FOR OP/ED)

## 2025-04-08 PROCEDURE — 1036F TOBACCO NON-USER: CPT

## 2025-04-08 PROCEDURE — 99024 POSTOP FOLLOW-UP VISIT: CPT

## 2025-04-08 PROCEDURE — 20610 DRAIN/INJ JOINT/BURSA W/O US: CPT | Mod: RT

## 2025-04-08 PROCEDURE — 73502 X-RAY EXAM HIP UNI 2-3 VIEWS: CPT | Mod: LEFT SIDE | Performed by: RADIOLOGY

## 2025-04-08 PROCEDURE — 1159F MED LIST DOCD IN RCRD: CPT

## 2025-04-08 RX ORDER — LIDOCAINE HYDROCHLORIDE 10 MG/ML
4 INJECTION, SOLUTION INFILTRATION; PERINEURAL
Status: COMPLETED | OUTPATIENT
Start: 2025-04-08 | End: 2025-04-08

## 2025-04-08 RX ORDER — TRIAMCINOLONE ACETONIDE 40 MG/ML
1 INJECTION, SUSPENSION INTRA-ARTICULAR; INTRAMUSCULAR
Status: COMPLETED | OUTPATIENT
Start: 2025-04-08 | End: 2025-04-08

## 2025-04-08 RX ADMIN — LIDOCAINE HYDROCHLORIDE 4 ML: 10 INJECTION, SOLUTION INFILTRATION; PERINEURAL at 16:48

## 2025-04-08 RX ADMIN — TRIAMCINOLONE ACETONIDE 1 ML: 40 INJECTION, SUSPENSION INTRA-ARTICULAR; INTRAMUSCULAR at 16:48

## 2025-04-17 ENCOUNTER — DOCUMENTATION (OUTPATIENT)
Dept: PHYSICAL THERAPY | Facility: HOSPITAL | Age: 65
End: 2025-04-17
Payer: MEDICARE

## 2025-04-17 NOTE — PROGRESS NOTES
"Physical Therapy    Discharge Summary    Name: Kelli Brito \"Cecelia\"  MRN: [id number]  : 1960  Date: 25    Discharge Summary: PT    Discharge Information: Date of discharge 2025, Date of last visit 2/3/2025, Date of evaluation 2/3/2025, Number of attended visits 1, Referred by Dr. Razo, and Referred for status post left total hip arthroplasty     Therapy Summary: Patient is a 65 year old female who presents to clinic status post left total hip arthroplasty on 1/10/2025. 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     Discharge Status: Patient presents status post posterior left total hip arthroplasty resulting in limited participation in pain-free ADLs and inability to perform at their prior level of function. Patient demonstrated slightly impaired gait pattern. Patient demonstrated good ability to verbalize and follow posterior hip precautions. Patient demonstrated good tolerance to exercises including step ups on 4\" step without increased hip pain. Patient is highly motivated to progress and gain her independence back. Skilled PT warranted to address the above stated impairments, so the patient can perform FA's without increased pain or difficulty.      Rehab Discharge Reason: Patient requested due to financial and/or insurance constraints  "

## 2025-05-05 DIAGNOSIS — Z47.1 AFTERCARE FOLLOWING JOINT REPLACEMENT SURGERY, UNSPECIFIED JOINT: ICD-10-CM

## 2025-05-05 RX ORDER — AMOXICILLIN 500 MG/1
CAPSULE ORAL
Qty: 4 CAPSULE | Refills: 6 | Status: SHIPPED | OUTPATIENT
Start: 2025-05-05

## 2025-05-09 NOTE — PROGRESS NOTES
"Patient ID: Kelli Brito \"Camila" is a 65 y.o. female.  Primary Care Provider: Jaxon Giraldo MD    Subjective    HPI:Gyn/Oncology consultation regarding newly diagnosed FIGO grade 1 endometrial cancer. Patient presented with a recent history of PMB to her primary care provider and subsequently underwent a TVUS demonstrating a uterine  mass. She had a D&C with Dr. Live with hysterectomy demonstrating FIGO grade 1 endometrial cancer.     ============================================  Medical History:  - BMI 37  - HLD  - Anxiety disorder  - L4 and L5 degenerative disc disease  - Obstructive sleep apnea with nocturnal CPAP use     Surgical History:  - Nasal surgery ()  - D&C     Obstetric/Gynecologic History: , 2 full term spontaneous vaginal deliveries  - Menarche age 12  - Menopausal since  with no prior episodes of PMB   - Last Pap: 2021 normal, denies history of prior abnormal pap smear     Family History:   - Reports maternal grandmother with ovarian cancer. Otherwise denies family history of breast/colon/uterine cancers. Father  secondary to malignancy in the setting of scleroderma.      Social History: Presents today with her , Gilles.   - Tobacco: Denies  - Alcohol: Denies  - Illicit substances, narcotics: Denies     Health Maintenance:   - Last mammogram: 2021  - Last colonoscopy/CRC screening: Cologua2021 normal, no screening colonoscopy     Code Status: Full      Objective    Visit Vitals  /85 (BP Location: Right arm, Patient Position: Sitting, BP Cuff Size: Adult long)   Pulse 73   Resp 18       Interval history:  Patient is a 65 year old female presents today for surveillance examination in the setting of stage IB grade 1 endometrioid carcinoma s/p hysterectomy and s/p whole pelvic radiation completed 2022. Patient had hip replacement since last seen.  Patient also diagnosed with Graves disease, taking Methimazole, deciding if having " thyroid removed.  She is feeling much better after last year.   She is putting her dog down later today from cancer.  Patient denies any vaginal bleeding, pink tinged discharge. Patient having constipation.  Appetite has been good.  Energy levels are baseline.  Patient denies hematuria.  Patient recently had dysuria, was treated for UTI, symptoms have improved but not resolved. Patient is not currently sexually active.     Physical Exam:    Constitutional: Doing well. ARIAN  Eyes: PERRL  ENMT: Moist mucus membranes  Head/Neck: Supple. Symmetrical  Cardiovascular: Regular, rate and rhythm. 2+ equal pulses of the extremities  Respiratory/Thorax: CTA. RRR. Chest rise symmetrical.  Gastrointestinal: Non-distended, soft, non-tender  Genitourinary:   Normal external female genitalia. No vulvar lesions noted,atrophic changes along introitus  Speculum exam: Smooth vaginal walls without lesions or masses. Vaginal cuff visualized without lesions, radiation changes noted, atrophic changes.     Bimanual exam: Smooth vaginal wall without lesions or masses.  Surgically absent uterus, cervix, and adnexa.    Rectovaginal exam: smooth rectovaginal septum without lesions or masses  Musculoskeletal: ROM intact, no joint swelling, normal strength  Extremities: No edema  Neurological: Alert and oriented x 3. Pleasant and cooperative.  Lymphatic: No lymphadenopathy. No lymphedema  Psychological: Appropriate mood and behavior  Skin: Bilateral groin erythematous rash noted    A complete review of systems was performed and all systems were normal except what is noted in the interval history.      Assessment/Plan  Patient is a 65 year old female presents today for surveillance examination in the setting of stage IB grade 1 endometrioid carcinoma s/p hysterectomy and s/p whole pelvic radiation completed 11/2022.  ARIAN 2.5 years.      PLAN: F/U in 6 months or as needed  Physical examination was within normal limits today.  She is currently ARIAN.   We reviewed signs and symptoms of possible recurrence with the patient and she will call our office should she experience any of these.

## 2025-05-15 ENCOUNTER — APPOINTMENT (OUTPATIENT)
Dept: GYNECOLOGIC ONCOLOGY | Facility: CLINIC | Age: 65
End: 2025-05-15
Payer: MEDICARE

## 2025-05-15 VITALS
RESPIRATION RATE: 18 BRPM | HEART RATE: 73 BPM | SYSTOLIC BLOOD PRESSURE: 154 MMHG | HEIGHT: 66 IN | DIASTOLIC BLOOD PRESSURE: 85 MMHG | BODY MASS INDEX: 37.16 KG/M2 | OXYGEN SATURATION: 97 % | WEIGHT: 231.2 LBS

## 2025-05-15 DIAGNOSIS — C54.1 ENDOMETRIAL CANCER (MULTI): ICD-10-CM

## 2025-05-15 DIAGNOSIS — R30.0 DYSURIA: Primary | ICD-10-CM

## 2025-05-15 PROCEDURE — 99213 OFFICE O/P EST LOW 20 MIN: CPT | Performed by: NURSE PRACTITIONER

## 2025-05-15 PROCEDURE — 3008F BODY MASS INDEX DOCD: CPT | Performed by: NURSE PRACTITIONER

## 2025-05-15 PROCEDURE — 3079F DIAST BP 80-89 MM HG: CPT | Performed by: NURSE PRACTITIONER

## 2025-05-15 PROCEDURE — 1126F AMNT PAIN NOTED NONE PRSNT: CPT | Performed by: NURSE PRACTITIONER

## 2025-05-15 PROCEDURE — 3077F SYST BP >= 140 MM HG: CPT | Performed by: NURSE PRACTITIONER

## 2025-05-15 ASSESSMENT — PAIN SCALES - GENERAL: PAINLEVEL_OUTOF10: 0-NO PAIN

## 2025-05-16 ENCOUNTER — LAB (OUTPATIENT)
Dept: LAB | Facility: HOSPITAL | Age: 65
End: 2025-05-16
Payer: MEDICARE

## 2025-05-16 PROCEDURE — 87186 SC STD MICRODIL/AGAR DIL: CPT

## 2025-05-16 PROCEDURE — 87077 CULTURE AEROBIC IDENTIFY: CPT

## 2025-05-16 PROCEDURE — 87086 URINE CULTURE/COLONY COUNT: CPT

## 2025-05-18 LAB — BACTERIA UR CULT: ABNORMAL

## 2025-05-20 ENCOUNTER — TELEPHONE (OUTPATIENT)
Dept: GYNECOLOGIC ONCOLOGY | Facility: HOSPITAL | Age: 65
End: 2025-05-20
Payer: MEDICARE

## 2025-05-20 DIAGNOSIS — R30.0 DYSURIA: Primary | ICD-10-CM

## 2025-05-20 RX ORDER — SULFAMETHOXAZOLE AND TRIMETHOPRIM 800; 160 MG/1; MG/1
1 TABLET ORAL 2 TIMES DAILY
Qty: 14 TABLET | Refills: 0 | Status: SHIPPED | OUTPATIENT
Start: 2025-05-20 | End: 2025-05-27

## 2025-05-20 NOTE — TELEPHONE ENCOUNTER
Called patient as she sent a [employer] message re: her + urine culture that resulted on 5/18. I sent this result/message to EDUARDO East who ordered the test. I will make sure that an antibiotic will get called in to her local [facility] today. Patient verbalized understanding.

## 2025-06-01 DIAGNOSIS — E05.90 HYPERTHYROIDISM: ICD-10-CM

## 2025-06-02 ENCOUNTER — TELEPHONE (OUTPATIENT)
Dept: GYNECOLOGIC ONCOLOGY | Facility: HOSPITAL | Age: 65
End: 2025-06-02
Payer: MEDICARE

## 2025-06-02 DIAGNOSIS — R30.0 DYSURIA: ICD-10-CM

## 2025-06-02 NOTE — TELEPHONE ENCOUNTER
"Phoned patient in follow up to below [employer] message.    Patient states \"things dont feel right\" and feels UTI has not resolved since completing Bactrim 3 days ago.      Message and UA/reflex urine order pended to Pratima Huston CNP    6/4/25  [id number]   Patient sent [employer] message asking if urine was tested for culture too.     UA forwarded to Pratima Huston CNP.     [employer] message sent to patient to notify that urine culture was ordered and results are pending.  "

## 2025-06-03 ENCOUNTER — LAB (OUTPATIENT)
Dept: LAB | Facility: HOSPITAL | Age: 65
End: 2025-06-03
Payer: MEDICARE

## 2025-06-03 LAB
APPEARANCE UR: CLEAR
BACTERIA #/AREA URNS AUTO: ABNORMAL /HPF
BILIRUB UR STRIP.AUTO-MCNC: NEGATIVE MG/DL
COLOR UR: COLORLESS
GLUCOSE UR STRIP.AUTO-MCNC: NORMAL MG/DL
HOLD SPECIMEN: NORMAL
KETONES UR STRIP.AUTO-MCNC: NEGATIVE MG/DL
LEUKOCYTE ESTERASE UR QL STRIP.AUTO: ABNORMAL
MUCOUS THREADS #/AREA URNS AUTO: ABNORMAL /LPF
NITRITE UR QL STRIP.AUTO: NEGATIVE
PH UR STRIP.AUTO: 5.5 [PH]
PROT UR STRIP.AUTO-MCNC: NEGATIVE MG/DL
RBC # UR STRIP.AUTO: NEGATIVE MG/DL
RBC #/AREA URNS AUTO: ABNORMAL /HPF
SP GR UR STRIP.AUTO: 1.01
SQUAMOUS #/AREA URNS AUTO: ABNORMAL /HPF
TRANS CELLS #/AREA UR COMP ASSIST: ABNORMAL /HPF
UROBILINOGEN UR STRIP.AUTO-MCNC: NORMAL MG/DL
WBC #/AREA URNS AUTO: ABNORMAL /HPF
WBC CLUMPS #/AREA URNS AUTO: ABNORMAL /HPF

## 2025-06-03 PROCEDURE — 81001 URINALYSIS AUTO W/SCOPE: CPT

## 2025-06-03 PROCEDURE — 87086 URINE CULTURE/COLONY COUNT: CPT

## 2025-06-04 LAB
T3FREE SERPL-MCNC: 4.3 PG/ML (ref 2.3–4.2)
T4 FREE SERPL-MCNC: 1.2 NG/DL (ref 0.8–1.8)
TSH SERPL-ACNC: 0.01 MIU/L (ref 0.4–4.5)

## 2025-06-05 ENCOUNTER — TELEPHONE (OUTPATIENT)
Dept: GYNECOLOGIC ONCOLOGY | Facility: HOSPITAL | Age: 65
End: 2025-06-05
Payer: MEDICARE

## 2025-06-05 LAB — BACTERIA UR CULT: NORMAL

## 2025-08-11 ENCOUNTER — APPOINTMENT (OUTPATIENT)
Dept: ORTHOPEDIC SURGERY | Facility: CLINIC | Age: 65
End: 2025-08-11
Payer: MEDICARE

## 2025-08-11 ENCOUNTER — PATIENT MESSAGE (OUTPATIENT)
Dept: PRIMARY CARE | Facility: CLINIC | Age: 65
End: 2025-08-11
Payer: MEDICARE

## 2025-08-11 DIAGNOSIS — R35.1 NOCTURIA: ICD-10-CM

## 2025-08-11 DIAGNOSIS — E55.9 VITAMIN D DEFICIENCY: Primary | ICD-10-CM

## 2025-08-11 DIAGNOSIS — E78.2 MIXED HYPERLIPIDEMIA: ICD-10-CM

## 2025-08-11 RX ORDER — ATORVASTATIN CALCIUM 20 MG/1
20 TABLET, FILM COATED ORAL
Qty: 90 TABLET | Refills: 0 | Status: SHIPPED | OUTPATIENT
Start: 2025-08-11

## 2025-08-12 ENCOUNTER — APPOINTMENT (OUTPATIENT)
Dept: ORTHOPEDIC SURGERY | Facility: CLINIC | Age: 65
End: 2025-08-12
Payer: MEDICARE

## 2025-09-03 LAB
25(OH)D3+25(OH)D2 SERPL-MCNC: 37 NG/ML (ref 30–100)
ALBUMIN SERPL-MCNC: 4 G/DL (ref 3.6–5.1)
ALP SERPL-CCNC: 100 U/L (ref 37–153)
ALT SERPL-CCNC: 13 U/L (ref 6–29)
ANION GAP SERPL CALCULATED.4IONS-SCNC: 9 MMOL/L (CALC) (ref 7–17)
AST SERPL-CCNC: 16 U/L (ref 10–35)
BILIRUB SERPL-MCNC: 0.6 MG/DL (ref 0.2–1.2)
BUN SERPL-MCNC: 20 MG/DL (ref 7–25)
CALCIUM SERPL-MCNC: 9.4 MG/DL (ref 8.6–10.4)
CHLORIDE SERPL-SCNC: 105 MMOL/L (ref 98–110)
CHOLEST SERPL-MCNC: 145 MG/DL
CHOLEST/HDLC SERPL: 3.4 (CALC)
CO2 SERPL-SCNC: 26 MMOL/L (ref 20–32)
CREAT SERPL-MCNC: 0.78 MG/DL (ref 0.5–1.05)
EGFRCR SERPLBLD CKD-EPI 2021: 84 ML/MIN/1.73M2
GLUCOSE SERPL-MCNC: 100 MG/DL (ref 65–99)
HDLC SERPL-MCNC: 43 MG/DL
LDLC SERPL CALC-MCNC: 79 MG/DL (CALC)
NONHDLC SERPL-MCNC: 102 MG/DL (CALC)
POTASSIUM SERPL-SCNC: 4.5 MMOL/L (ref 3.5–5.3)
PROT SERPL-MCNC: 7 G/DL (ref 6.1–8.1)
SODIUM SERPL-SCNC: 140 MMOL/L (ref 135–146)
TRIGL SERPL-MCNC: 131 MG/DL

## 2025-10-01 ENCOUNTER — APPOINTMENT (OUTPATIENT)
Dept: ENDOCRINOLOGY | Facility: CLINIC | Age: 65
End: 2025-10-01
Payer: MEDICARE

## 2025-11-20 ENCOUNTER — APPOINTMENT (OUTPATIENT)
Dept: GYNECOLOGIC ONCOLOGY | Facility: CLINIC | Age: 65
End: 2025-11-20
Payer: MEDICARE

## (undated) DEVICE — DRAPE, IRRIGATION, W/POUCH, ADHESIVE STRIP, STERI DRAPE, 19 X 23 IN, DISPOSABLE, STERILE

## (undated) DEVICE — STRIP, SKIN CLOSURE, STERI STRIP, REINFORCED, 0.5 X 4 IN

## (undated) DEVICE — DRESSING, PREVENA, PEEL AND PLACE, 20CM

## (undated) DEVICE — WOUND SYSTEM, DEBRIDEMENT & CLEANING, O.R DUOPAK

## (undated) DEVICE — SUTURE, ETHIBOND XTRA, 5 V-37, GRN/BR, LF

## (undated) DEVICE — SOLUTION, TOPICAL, ALCOHOL, 70% ISOPROPYL, 4OZ

## (undated) DEVICE — GLOVE, SURGICAL, PROTEXIS PI BLUE W/NEUTHERA, 8.0, PF, LF

## (undated) DEVICE — TUBING, SUCTION, 6MM X 10, CLEAN N-COND

## (undated) DEVICE — SYRINGE, 60 CC, IRRIGATION, BULB, CONTRO-BULB, PAPER POUCH

## (undated) DEVICE — SOLUTION, CHLORHEXIDINE, 4%, 4OZ

## (undated) DEVICE — HOOD, SURGICAL, FLYTE HYBRID

## (undated) DEVICE — DRESSING, MEPILEX FOAM BORDER AG, SILVER, 4 X 4

## (undated) DEVICE — GLOVE, SURGICAL, PROTEXIS PI , 8.0, PF, LF

## (undated) DEVICE — TUBING, IRRIGATION, HIGH FLOW, HAND PIECE SET

## (undated) DEVICE — SUTURE, STRATAFIX, SPIRAL MONOCRYL PLUS, 3-0, PS-1 45CM, UNDYED

## (undated) DEVICE — MARKER, SURGICAL, SKIN, REG TIP, W/ RULER & LABELS

## (undated) DEVICE — THERAPY UNIT, PREVENA PLUS 125

## (undated) DEVICE — NEEDLE, SPINAL, S/SU, 18GA 3IN, QUINCKE, STERILE

## (undated) DEVICE — BLADE, SAW, SAGITTAL, DUAL CUT, 18 X 90 X 1.27

## (undated) DEVICE — SYRINGE, 50 CC, LUER LOCK

## (undated) DEVICE — DRAPE, ISOLATION, INCISE, W/POUCH, STERI DRAPE, 125 X 83 IN, DISPOSABLE, STERILE

## (undated) DEVICE — TRAY, DRY PREP, PREMIUM

## (undated) DEVICE — Device

## (undated) DEVICE — BANDAGE, COFLEX, 6 X 5 YDS, TAN, STERILE, LF

## (undated) DEVICE — TIP, SUCTION, YANKAUER, FLEXIBLE

## (undated) DEVICE — SUTURE, VICRYL, 0, 36 IN, CT-1, UNDYED

## (undated) DEVICE — DRAPE, ISOLATION, ANTIMICROBIAL, W/POUCH, IOBAN 2, STERI DRAPE, 125 X 83 IN, DISPOSABLE, STERILE